# Patient Record
Sex: MALE | Race: WHITE | NOT HISPANIC OR LATINO | Employment: OTHER | URBAN - METROPOLITAN AREA
[De-identification: names, ages, dates, MRNs, and addresses within clinical notes are randomized per-mention and may not be internally consistent; named-entity substitution may affect disease eponyms.]

---

## 2018-04-11 LAB
ALBUMIN (HISTORICAL): 1.1 MG/DL
ALBUMIN CREATININE RATIO (HISTORICAL): 11.4 MG/G
ALBUMIN SERPL BCP-MCNC: 4.8 G/DL (ref 3.5–5.7)
ALP SERPL-CCNC: 62 IU/L (ref 55–165)
ALT SERPL W P-5'-P-CCNC: 17 IU/L (ref 7–29)
ANION GAP SERPL CALCULATED.3IONS-SCNC: 11.7 MM/L
AST SERPL W P-5'-P-CCNC: 18 U/L (ref 8–27)
BASOPHILS # BLD AUTO: 0 X3/UL (ref 0–0.3)
BASOPHILS # BLD AUTO: 0.4 % (ref 0–2)
BILIRUB SERPL-MCNC: 1.7 MG/DL (ref 0.3–1)
BUN SERPL-MCNC: 18 MG/DL (ref 7–25)
CALCIUM SERPL-MCNC: 10 MG/DL (ref 8.6–10.5)
CHLORIDE SERPL-SCNC: 103 MM/L (ref 98–107)
CO2 SERPL-SCNC: 32 MM/L (ref 21–31)
CREAT SERPL-MCNC: 0.89 MG/DL (ref 0.7–1.3)
CREATININE, RANDOM URINE (HISTORICAL): 96.1 MG/DL
DEPRECATED RDW RBC AUTO: 13.8 %
EGFR (HISTORICAL): > 60 GFR
EGFR AFRICAN AMERICAN (HISTORICAL): > 60 GFR
EOSINOPHIL # BLD AUTO: 0.1 X3/UL (ref 0–0.5)
EOSINOPHIL NFR BLD AUTO: 1.6 % (ref 0–5)
GLUCOSE (HISTORICAL): 111 MG/DL (ref 65–99)
HCT VFR BLD AUTO: 41.3 % (ref 42–52)
HGB BLD-MCNC: 14.3 G/DL (ref 14–18)
LYMPHOCYTES # BLD AUTO: 1.6 X3/UL (ref 1.2–4.2)
LYMPHOCYTES NFR BLD AUTO: 20.7 % (ref 20.5–51.1)
MCH RBC QN AUTO: 30.7 PG (ref 26–34)
MCHC RBC AUTO-ENTMCNC: 34.5 G/DL (ref 31–37)
MCV RBC AUTO: 89.1 FL (ref 81–99)
MONOCYTES # BLD AUTO: 0.7 X3/UL (ref 0–1)
MONOCYTES NFR BLD AUTO: 9.1 % (ref 1.7–12)
NEUTROPHILS # BLD AUTO: 5.2 X3/UL (ref 1.4–6.5)
NEUTS SEG NFR BLD AUTO: 68.2 % (ref 42.2–75.2)
OSMOLALITY, SERUM (HISTORICAL): 288 MOSM (ref 262–291)
PLATELET # BLD AUTO: 227 X3/UL (ref 130–400)
PMV BLD AUTO: 7.6 FL
POTASSIUM SERPL-SCNC: 3.7 MM/L (ref 3.5–5.5)
PSA (HISTORICAL): 4.1 NG/ML (ref 0–4)
RBC # BLD AUTO: 4.64 X6/UL (ref 4.3–5.9)
SODIUM SERPL-SCNC: 143 MM/L (ref 134–143)
TOTAL PROTEIN (HISTORICAL): 7.2 G/DL (ref 6.4–8.9)
WBC # BLD AUTO: 7.7 X3/UL (ref 4.8–10.8)

## 2019-03-12 ENCOUNTER — APPOINTMENT (OUTPATIENT)
Dept: LAB | Facility: HOSPITAL | Age: 74
End: 2019-03-12
Attending: INTERNAL MEDICINE
Payer: MEDICARE

## 2019-03-12 ENCOUNTER — TRANSCRIBE ORDERS (OUTPATIENT)
Dept: ADMINISTRATIVE | Facility: HOSPITAL | Age: 74
End: 2019-03-12

## 2019-03-12 DIAGNOSIS — E11.9 TYPE 2 DIABETES MELLITUS WITHOUT COMPLICATION, UNSPECIFIED WHETHER LONG TERM INSULIN USE (HCC): ICD-10-CM

## 2019-03-12 DIAGNOSIS — E11.9 TYPE 2 DIABETES MELLITUS WITHOUT COMPLICATION, UNSPECIFIED WHETHER LONG TERM INSULIN USE (HCC): Primary | ICD-10-CM

## 2019-03-12 DIAGNOSIS — Z79.1 ENCOUNTER FOR LONG-TERM (CURRENT) USE OF NSAIDS: ICD-10-CM

## 2019-03-12 LAB
ALBUMIN SERPL BCP-MCNC: 4.6 G/DL (ref 3.5–5.7)
ALP SERPL-CCNC: 57 U/L (ref 55–165)
ALT SERPL W P-5'-P-CCNC: 18 U/L (ref 7–52)
ANION GAP SERPL CALCULATED.3IONS-SCNC: 10 MMOL/L (ref 4–13)
AST SERPL W P-5'-P-CCNC: 20 U/L (ref 13–39)
BASOPHILS # BLD AUTO: 0 THOUSANDS/ΜL (ref 0–0.1)
BASOPHILS NFR BLD AUTO: 0 % (ref 0–1)
BILIRUB SERPL-MCNC: 2.5 MG/DL (ref 0.2–1)
BUN SERPL-MCNC: 19 MG/DL (ref 7–25)
CALCIUM SERPL-MCNC: 10 MG/DL (ref 8.6–10.5)
CHLORIDE SERPL-SCNC: 102 MMOL/L (ref 98–107)
CHOLEST SERPL-MCNC: 141 MG/DL (ref 0–200)
CO2 SERPL-SCNC: 30 MMOL/L (ref 21–31)
CREAT SERPL-MCNC: 0.82 MG/DL (ref 0.7–1.3)
CREAT UR-MCNC: 21.1 MG/DL
EOSINOPHIL # BLD AUTO: 0.1 THOUSAND/ΜL (ref 0–0.61)
EOSINOPHIL NFR BLD AUTO: 2 % (ref 0–6)
ERYTHROCYTE [DISTWIDTH] IN BLOOD BY AUTOMATED COUNT: 13.5 % (ref 11.6–15.1)
EST. AVERAGE GLUCOSE BLD GHB EST-MCNC: 140 MG/DL
GFR SERPL CREATININE-BSD FRML MDRD: 87 ML/MIN/1.73SQ M
GLUCOSE P FAST SERPL-MCNC: 97 MG/DL (ref 65–99)
HBA1C MFR BLD: 6.5 % (ref 4.2–6.3)
HCT VFR BLD AUTO: 41 % (ref 42–52)
HDLC SERPL-MCNC: 54 MG/DL (ref 40–60)
HGB BLD-MCNC: 14 G/DL (ref 12–17)
LDLC SERPL CALC-MCNC: 52 MG/DL (ref 0–100)
LYMPHOCYTES # BLD AUTO: 1.9 THOUSANDS/ΜL (ref 0.6–4.47)
LYMPHOCYTES NFR BLD AUTO: 23 % (ref 14–44)
MCH RBC QN AUTO: 30.6 PG (ref 26.8–34.3)
MCHC RBC AUTO-ENTMCNC: 34.2 G/DL (ref 31.4–37.4)
MCV RBC AUTO: 90 FL (ref 82–98)
MICROALBUMIN UR-MCNC: <5 MG/L (ref 0–20)
MICROALBUMIN/CREAT 24H UR: <24 MG/G CREATININE (ref 0–30)
MONOCYTES # BLD AUTO: 0.7 THOUSAND/ΜL (ref 0.17–1.22)
MONOCYTES NFR BLD AUTO: 8 % (ref 4–12)
NEUTROPHILS # BLD AUTO: 5.5 THOUSANDS/ΜL (ref 1.85–7.62)
NEUTS SEG NFR BLD AUTO: 66 % (ref 43–75)
NONHDLC SERPL-MCNC: 87 MG/DL
NRBC BLD AUTO-RTO: 0 /100 WBCS
PLATELET # BLD AUTO: 247 THOUSANDS/UL (ref 149–390)
PMV BLD AUTO: 8.2 FL (ref 8.9–12.7)
POTASSIUM SERPL-SCNC: 3.9 MMOL/L (ref 3.5–5.5)
PROT SERPL-MCNC: 7.1 G/DL (ref 6.4–8.9)
RBC # BLD AUTO: 4.58 MILLION/UL (ref 3.88–5.62)
SODIUM SERPL-SCNC: 142 MMOL/L (ref 134–143)
TRIGL SERPL-MCNC: 173 MG/DL (ref 44–166)
WBC # BLD AUTO: 8.2 THOUSAND/UL (ref 4.31–10.16)

## 2019-03-12 PROCEDURE — 80061 LIPID PANEL: CPT

## 2019-03-12 PROCEDURE — 83036 HEMOGLOBIN GLYCOSYLATED A1C: CPT

## 2019-03-12 PROCEDURE — 85025 COMPLETE CBC W/AUTO DIFF WBC: CPT

## 2019-03-12 PROCEDURE — 82043 UR ALBUMIN QUANTITATIVE: CPT

## 2019-03-12 PROCEDURE — 82570 ASSAY OF URINE CREATININE: CPT

## 2019-03-12 PROCEDURE — 80053 COMPREHEN METABOLIC PANEL: CPT

## 2019-03-12 PROCEDURE — 36415 COLL VENOUS BLD VENIPUNCTURE: CPT

## 2019-06-10 ENCOUNTER — APPOINTMENT (OUTPATIENT)
Dept: LAB | Facility: HOSPITAL | Age: 74
End: 2019-06-10
Attending: UROLOGY
Payer: MEDICARE

## 2019-06-10 ENCOUNTER — TRANSCRIBE ORDERS (OUTPATIENT)
Dept: ADMINISTRATIVE | Facility: HOSPITAL | Age: 74
End: 2019-06-10

## 2019-06-10 DIAGNOSIS — R97.20 ELEVATED PROSTATE SPECIFIC ANTIGEN (PSA): ICD-10-CM

## 2019-06-10 DIAGNOSIS — R97.20 ELEVATED PROSTATE SPECIFIC ANTIGEN (PSA): Primary | ICD-10-CM

## 2019-06-10 LAB
BACTERIA UR QL AUTO: ABNORMAL /HPF
BILIRUB UR QL STRIP: NEGATIVE
CLARITY UR: CLEAR
COLOR UR: YELLOW
GLUCOSE UR STRIP-MCNC: NEGATIVE MG/DL
HGB UR QL STRIP.AUTO: NEGATIVE
KETONES UR STRIP-MCNC: NEGATIVE MG/DL
LEUKOCYTE ESTERASE UR QL STRIP: ABNORMAL
MUCOUS THREADS UR QL AUTO: ABNORMAL
NITRITE UR QL STRIP: NEGATIVE
NON-SQ EPI CELLS URNS QL MICRO: ABNORMAL /HPF
PH UR STRIP.AUTO: 5.5 [PH]
PROT UR STRIP-MCNC: NEGATIVE MG/DL
RBC #/AREA URNS AUTO: ABNORMAL /HPF
SP GR UR STRIP.AUTO: 1.02 (ref 1–1.03)
UROBILINOGEN UR QL STRIP.AUTO: 0.2 E.U./DL
WBC #/AREA URNS AUTO: ABNORMAL /HPF

## 2019-06-10 PROCEDURE — 84153 ASSAY OF PSA TOTAL: CPT

## 2019-06-10 PROCEDURE — 36415 COLL VENOUS BLD VENIPUNCTURE: CPT

## 2019-06-10 PROCEDURE — 84154 ASSAY OF PSA FREE: CPT

## 2019-06-10 PROCEDURE — 81001 URINALYSIS AUTO W/SCOPE: CPT

## 2019-06-11 LAB
PSA FREE MFR SERPL: 40.5 %
PSA FREE SERPL-MCNC: 1.74 NG/ML
PSA SERPL-MCNC: 4.3 NG/ML (ref 0–4)

## 2019-09-30 ENCOUNTER — APPOINTMENT (OUTPATIENT)
Dept: LAB | Facility: HOSPITAL | Age: 74
End: 2019-09-30
Attending: UROLOGY
Payer: MEDICARE

## 2019-09-30 ENCOUNTER — TRANSCRIBE ORDERS (OUTPATIENT)
Dept: ADMINISTRATIVE | Facility: HOSPITAL | Age: 74
End: 2019-09-30

## 2019-09-30 DIAGNOSIS — R97.20 ELEVATED PROSTATE SPECIFIC ANTIGEN (PSA): ICD-10-CM

## 2019-09-30 DIAGNOSIS — R97.20 ELEVATED PROSTATE SPECIFIC ANTIGEN (PSA): Primary | ICD-10-CM

## 2019-09-30 DIAGNOSIS — E11.9 TYPE 2 DIABETES MELLITUS WITHOUT COMPLICATION, WITHOUT LONG-TERM CURRENT USE OF INSULIN (HCC): ICD-10-CM

## 2019-09-30 DIAGNOSIS — E11.9 TYPE 2 DIABETES MELLITUS WITHOUT COMPLICATION, WITHOUT LONG-TERM CURRENT USE OF INSULIN (HCC): Primary | ICD-10-CM

## 2019-09-30 LAB
EST. AVERAGE GLUCOSE BLD GHB EST-MCNC: 128 MG/DL
HBA1C MFR BLD: 6.1 % (ref 4.2–6.3)

## 2019-09-30 PROCEDURE — 36415 COLL VENOUS BLD VENIPUNCTURE: CPT

## 2019-09-30 PROCEDURE — 83036 HEMOGLOBIN GLYCOSYLATED A1C: CPT

## 2019-09-30 PROCEDURE — 84154 ASSAY OF PSA FREE: CPT

## 2019-09-30 PROCEDURE — 84153 ASSAY OF PSA TOTAL: CPT

## 2019-10-01 LAB
PSA FREE MFR SERPL: 36.7 %
PSA FREE SERPL-MCNC: 1.69 NG/ML
PSA SERPL-MCNC: 4.6 NG/ML (ref 0–4)

## 2019-10-31 ENCOUNTER — TRANSCRIBE ORDERS (OUTPATIENT)
Dept: ADMINISTRATIVE | Facility: HOSPITAL | Age: 74
End: 2019-10-31

## 2019-10-31 ENCOUNTER — APPOINTMENT (OUTPATIENT)
Dept: LAB | Facility: HOSPITAL | Age: 74
End: 2019-10-31
Attending: UROLOGY
Payer: MEDICARE

## 2019-10-31 DIAGNOSIS — R97.20 ELEVATED PROSTATE SPECIFIC ANTIGEN (PSA): Primary | ICD-10-CM

## 2019-10-31 DIAGNOSIS — R97.20 ELEVATED PROSTATE SPECIFIC ANTIGEN (PSA): ICD-10-CM

## 2019-10-31 PROCEDURE — 84153 ASSAY OF PSA TOTAL: CPT

## 2019-10-31 PROCEDURE — 36415 COLL VENOUS BLD VENIPUNCTURE: CPT

## 2019-10-31 PROCEDURE — 84154 ASSAY OF PSA FREE: CPT

## 2019-11-01 LAB
PSA FREE MFR SERPL: 27.7 %
PSA FREE SERPL-MCNC: 1.47 NG/ML
PSA SERPL-MCNC: 5.3 NG/ML (ref 0–4)

## 2020-02-04 ENCOUNTER — APPOINTMENT (OUTPATIENT)
Dept: LAB | Facility: CLINIC | Age: 75
End: 2020-02-04
Payer: MEDICARE

## 2020-02-04 ENCOUNTER — TRANSCRIBE ORDERS (OUTPATIENT)
Dept: URGENT CARE | Facility: CLINIC | Age: 75
End: 2020-02-04

## 2020-02-04 DIAGNOSIS — R97.20 ELEVATED PROSTATE SPECIFIC ANTIGEN (PSA): Primary | ICD-10-CM

## 2020-02-04 DIAGNOSIS — R97.20 ELEVATED PROSTATE SPECIFIC ANTIGEN (PSA): ICD-10-CM

## 2020-02-04 PROCEDURE — 84153 ASSAY OF PSA TOTAL: CPT

## 2020-02-04 PROCEDURE — 84154 ASSAY OF PSA FREE: CPT

## 2020-02-04 PROCEDURE — 36415 COLL VENOUS BLD VENIPUNCTURE: CPT

## 2020-02-05 LAB
PSA FREE MFR SERPL: 38.6 %
PSA FREE SERPL-MCNC: 1.35 NG/ML
PSA SERPL-MCNC: 3.5 NG/ML (ref 0–4)

## 2020-02-24 ENCOUNTER — APPOINTMENT (OUTPATIENT)
Dept: LAB | Facility: CLINIC | Age: 75
End: 2020-02-24
Payer: MEDICARE

## 2020-02-24 ENCOUNTER — TRANSCRIBE ORDERS (OUTPATIENT)
Dept: URGENT CARE | Facility: CLINIC | Age: 75
End: 2020-02-24

## 2020-02-24 DIAGNOSIS — E11.9 DIABETES MELLITUS WITHOUT COMPLICATION (HCC): ICD-10-CM

## 2020-02-24 DIAGNOSIS — Z79.1 ENCOUNTER FOR LONG-TERM (CURRENT) USE OF NON-STEROIDAL ANTI-INFLAMMATORIES: ICD-10-CM

## 2020-02-24 DIAGNOSIS — E11.9 DIABETES MELLITUS WITHOUT COMPLICATION (HCC): Primary | ICD-10-CM

## 2020-02-24 LAB
ALBUMIN SERPL BCP-MCNC: 4.3 G/DL (ref 3.5–5)
ALP SERPL-CCNC: 76 U/L (ref 46–116)
ALT SERPL W P-5'-P-CCNC: 26 U/L (ref 12–78)
ANION GAP SERPL CALCULATED.3IONS-SCNC: 4 MMOL/L (ref 4–13)
AST SERPL W P-5'-P-CCNC: 20 U/L (ref 5–45)
BILIRUB SERPL-MCNC: 2.5 MG/DL (ref 0.2–1)
BUN SERPL-MCNC: 14 MG/DL (ref 5–25)
CALCIUM SERPL-MCNC: 9.3 MG/DL (ref 8.3–10.1)
CHLORIDE SERPL-SCNC: 106 MMOL/L (ref 100–108)
CO2 SERPL-SCNC: 28 MMOL/L (ref 21–32)
CREAT SERPL-MCNC: 0.86 MG/DL (ref 0.6–1.3)
CREAT UR-MCNC: 28.6 MG/DL
ERYTHROCYTE [DISTWIDTH] IN BLOOD BY AUTOMATED COUNT: 13 % (ref 11.6–15.1)
EST. AVERAGE GLUCOSE BLD GHB EST-MCNC: 126 MG/DL
GFR SERPL CREATININE-BSD FRML MDRD: 84 ML/MIN/1.73SQ M
GLUCOSE P FAST SERPL-MCNC: 101 MG/DL (ref 65–99)
HBA1C MFR BLD: 6 %
HCT VFR BLD AUTO: 41.4 % (ref 36.5–49.3)
HGB BLD-MCNC: 13.5 G/DL (ref 12–17)
MCH RBC QN AUTO: 30.1 PG (ref 26.8–34.3)
MCHC RBC AUTO-ENTMCNC: 32.6 G/DL (ref 31.4–37.4)
MCV RBC AUTO: 92 FL (ref 82–98)
MICROALBUMIN UR-MCNC: 6.5 MG/L (ref 0–20)
MICROALBUMIN/CREAT 24H UR: 23 MG/G CREATININE (ref 0–30)
PLATELET # BLD AUTO: 230 THOUSANDS/UL (ref 149–390)
PMV BLD AUTO: 10 FL (ref 8.9–12.7)
POTASSIUM SERPL-SCNC: 3.6 MMOL/L (ref 3.5–5.3)
PROT SERPL-MCNC: 7.5 G/DL (ref 6.4–8.2)
RBC # BLD AUTO: 4.49 MILLION/UL (ref 3.88–5.62)
SODIUM SERPL-SCNC: 138 MMOL/L (ref 136–145)
WBC # BLD AUTO: 9.59 THOUSAND/UL (ref 4.31–10.16)

## 2020-02-24 PROCEDURE — 36415 COLL VENOUS BLD VENIPUNCTURE: CPT

## 2020-02-24 PROCEDURE — 82043 UR ALBUMIN QUANTITATIVE: CPT | Performed by: INTERNAL MEDICINE

## 2020-02-24 PROCEDURE — 82570 ASSAY OF URINE CREATININE: CPT | Performed by: INTERNAL MEDICINE

## 2020-02-24 PROCEDURE — 80053 COMPREHEN METABOLIC PANEL: CPT

## 2020-02-24 PROCEDURE — 83036 HEMOGLOBIN GLYCOSYLATED A1C: CPT

## 2020-02-24 PROCEDURE — 85027 COMPLETE CBC AUTOMATED: CPT

## 2020-03-16 ENCOUNTER — APPOINTMENT (OUTPATIENT)
Dept: LAB | Facility: CLINIC | Age: 75
End: 2020-03-16
Payer: MEDICARE

## 2020-03-16 ENCOUNTER — TRANSCRIBE ORDERS (OUTPATIENT)
Dept: URGENT CARE | Facility: CLINIC | Age: 75
End: 2020-03-16

## 2020-03-16 DIAGNOSIS — R97.20 ELEVATED PROSTATE SPECIFIC ANTIGEN (PSA): Primary | ICD-10-CM

## 2020-03-16 DIAGNOSIS — Z79.1 ENCOUNTER FOR LONG-TERM (CURRENT) USE OF NON-STEROIDAL ANTI-INFLAMMATORIES: ICD-10-CM

## 2020-03-16 DIAGNOSIS — E11.69 TYPE 2 DIABETES MELLITUS WITH OTHER SPECIFIED COMPLICATION, UNSPECIFIED WHETHER LONG TERM INSULIN USE (HCC): ICD-10-CM

## 2020-03-16 DIAGNOSIS — E11.69 TYPE 2 DIABETES MELLITUS WITH OTHER SPECIFIED COMPLICATION, UNSPECIFIED WHETHER LONG TERM INSULIN USE (HCC): Primary | ICD-10-CM

## 2020-03-16 DIAGNOSIS — R97.20 ELEVATED PROSTATE SPECIFIC ANTIGEN (PSA): ICD-10-CM

## 2020-03-16 LAB
ALBUMIN SERPL BCP-MCNC: 4.1 G/DL (ref 3.5–5)
ALP SERPL-CCNC: 77 U/L (ref 46–116)
ALT SERPL W P-5'-P-CCNC: 39 U/L (ref 12–78)
ANION GAP SERPL CALCULATED.3IONS-SCNC: 7 MMOL/L (ref 4–13)
AST SERPL W P-5'-P-CCNC: 34 U/L (ref 5–45)
BASOPHILS # BLD AUTO: 0.02 THOUSANDS/ΜL (ref 0–0.1)
BASOPHILS NFR BLD AUTO: 0 % (ref 0–1)
BILIRUB SERPL-MCNC: 2.73 MG/DL (ref 0.2–1)
BILIRUB UR QL STRIP: NEGATIVE
BUN SERPL-MCNC: 17 MG/DL (ref 5–25)
CALCIUM SERPL-MCNC: 9.2 MG/DL (ref 8.3–10.1)
CHLORIDE SERPL-SCNC: 108 MMOL/L (ref 100–108)
CHOLEST SERPL-MCNC: 173 MG/DL (ref 50–200)
CLARITY UR: CLEAR
CO2 SERPL-SCNC: 28 MMOL/L (ref 21–32)
COLOR UR: YELLOW
CREAT SERPL-MCNC: 0.91 MG/DL (ref 0.6–1.3)
CREAT UR-MCNC: <13 MG/DL
EOSINOPHIL # BLD AUTO: 0.09 THOUSAND/ΜL (ref 0–0.61)
EOSINOPHIL NFR BLD AUTO: 1 % (ref 0–6)
ERYTHROCYTE [DISTWIDTH] IN BLOOD BY AUTOMATED COUNT: 13 % (ref 11.6–15.1)
EST. AVERAGE GLUCOSE BLD GHB EST-MCNC: 117 MG/DL
GFR SERPL CREATININE-BSD FRML MDRD: 82 ML/MIN/1.73SQ M
GLUCOSE P FAST SERPL-MCNC: 101 MG/DL (ref 65–99)
GLUCOSE UR STRIP-MCNC: NEGATIVE MG/DL
HBA1C MFR BLD: 5.7 %
HCT VFR BLD AUTO: 41.3 % (ref 36.5–49.3)
HDLC SERPL-MCNC: 68 MG/DL
HGB BLD-MCNC: 13.7 G/DL (ref 12–17)
HGB UR QL STRIP.AUTO: NEGATIVE
IMM GRANULOCYTES # BLD AUTO: 0.03 THOUSAND/UL (ref 0–0.2)
IMM GRANULOCYTES NFR BLD AUTO: 0 % (ref 0–2)
KETONES UR STRIP-MCNC: NEGATIVE MG/DL
LDLC SERPL CALC-MCNC: 68 MG/DL (ref 0–100)
LEUKOCYTE ESTERASE UR QL STRIP: NEGATIVE
LYMPHOCYTES # BLD AUTO: 1.59 THOUSANDS/ΜL (ref 0.6–4.47)
LYMPHOCYTES NFR BLD AUTO: 17 % (ref 14–44)
MCH RBC QN AUTO: 30.4 PG (ref 26.8–34.3)
MCHC RBC AUTO-ENTMCNC: 33.2 G/DL (ref 31.4–37.4)
MCV RBC AUTO: 92 FL (ref 82–98)
MICROALBUMIN UR-MCNC: 5.5 MG/L (ref 0–20)
MICROALBUMIN/CREAT 24H UR: >42 MG/G CREATININE (ref 0–30)
MONOCYTES # BLD AUTO: 0.62 THOUSAND/ΜL (ref 0.17–1.22)
MONOCYTES NFR BLD AUTO: 7 % (ref 4–12)
NEUTROPHILS # BLD AUTO: 6.77 THOUSANDS/ΜL (ref 1.85–7.62)
NEUTS SEG NFR BLD AUTO: 75 % (ref 43–75)
NITRITE UR QL STRIP: NEGATIVE
NONHDLC SERPL-MCNC: 105 MG/DL
NRBC BLD AUTO-RTO: 0 /100 WBCS
PH UR STRIP.AUTO: 6.5 [PH]
PLATELET # BLD AUTO: 226 THOUSANDS/UL (ref 149–390)
PMV BLD AUTO: 10.3 FL (ref 8.9–12.7)
POTASSIUM SERPL-SCNC: 3.9 MMOL/L (ref 3.5–5.3)
PROT SERPL-MCNC: 7.5 G/DL (ref 6.4–8.2)
PROT UR STRIP-MCNC: NEGATIVE MG/DL
RBC # BLD AUTO: 4.5 MILLION/UL (ref 3.88–5.62)
SODIUM SERPL-SCNC: 143 MMOL/L (ref 136–145)
SP GR UR STRIP.AUTO: 1 (ref 1–1.03)
TRIGL SERPL-MCNC: 186 MG/DL
UROBILINOGEN UR QL STRIP.AUTO: 0.2 E.U./DL
WBC # BLD AUTO: 9.12 THOUSAND/UL (ref 4.31–10.16)

## 2020-03-16 PROCEDURE — 36415 COLL VENOUS BLD VENIPUNCTURE: CPT | Performed by: INTERNAL MEDICINE

## 2020-03-16 PROCEDURE — 85025 COMPLETE CBC W/AUTO DIFF WBC: CPT | Performed by: INTERNAL MEDICINE

## 2020-03-16 PROCEDURE — 84153 ASSAY OF PSA TOTAL: CPT

## 2020-03-16 PROCEDURE — 82043 UR ALBUMIN QUANTITATIVE: CPT | Performed by: INTERNAL MEDICINE

## 2020-03-16 PROCEDURE — 83036 HEMOGLOBIN GLYCOSYLATED A1C: CPT

## 2020-03-16 PROCEDURE — 80053 COMPREHEN METABOLIC PANEL: CPT

## 2020-03-16 PROCEDURE — 80061 LIPID PANEL: CPT

## 2020-03-16 PROCEDURE — 82570 ASSAY OF URINE CREATININE: CPT | Performed by: INTERNAL MEDICINE

## 2020-03-16 PROCEDURE — 81003 URINALYSIS AUTO W/O SCOPE: CPT | Performed by: UROLOGY

## 2020-03-16 PROCEDURE — 84154 ASSAY OF PSA FREE: CPT

## 2020-03-17 LAB
PSA FREE MFR SERPL: 40.3 %
PSA FREE SERPL-MCNC: 1.41 NG/ML
PSA SERPL-MCNC: 3.5 NG/ML (ref 0–4)

## 2020-08-28 ENCOUNTER — APPOINTMENT (EMERGENCY)
Dept: CT IMAGING | Facility: HOSPITAL | Age: 75
End: 2020-08-28
Payer: MEDICARE

## 2020-08-28 ENCOUNTER — APPOINTMENT (EMERGENCY)
Dept: RADIOLOGY | Facility: HOSPITAL | Age: 75
DRG: 963 | End: 2020-08-28
Payer: MEDICARE

## 2020-08-28 ENCOUNTER — HOSPITAL ENCOUNTER (EMERGENCY)
Facility: HOSPITAL | Age: 75
End: 2020-08-28
Attending: FAMILY MEDICINE | Admitting: FAMILY MEDICINE
Payer: MEDICARE

## 2020-08-28 ENCOUNTER — APPOINTMENT (EMERGENCY)
Dept: RADIOLOGY | Facility: HOSPITAL | Age: 75
End: 2020-08-28
Payer: MEDICARE

## 2020-08-28 ENCOUNTER — APPOINTMENT (INPATIENT)
Dept: RADIOLOGY | Facility: HOSPITAL | Age: 75
DRG: 963 | End: 2020-08-28
Payer: MEDICARE

## 2020-08-28 ENCOUNTER — HOSPITAL ENCOUNTER (INPATIENT)
Facility: HOSPITAL | Age: 75
LOS: 18 days | Discharge: NURSING, REHAB OR LONG-TERM CARE | DRG: 963 | End: 2020-09-15
Attending: EMERGENCY MEDICINE | Admitting: SURGERY
Payer: MEDICARE

## 2020-08-28 VITALS
TEMPERATURE: 97.9 F | SYSTOLIC BLOOD PRESSURE: 122 MMHG | HEART RATE: 93 BPM | DIASTOLIC BLOOD PRESSURE: 69 MMHG | WEIGHT: 168.43 LBS | OXYGEN SATURATION: 99 % | RESPIRATION RATE: 13 BRPM

## 2020-08-28 DIAGNOSIS — F03.90 DEMENTIA (HCC): ICD-10-CM

## 2020-08-28 DIAGNOSIS — S06.9X9A TRAUMATIC BRAIN INJURY WITH LOSS OF CONSCIOUSNESS, INITIAL ENCOUNTER (HCC): Primary | ICD-10-CM

## 2020-08-28 DIAGNOSIS — E87.2 METABOLIC ACIDOSIS: ICD-10-CM

## 2020-08-28 DIAGNOSIS — S06.5X9A SUBDURAL HEMATOMA (HCC): ICD-10-CM

## 2020-08-28 DIAGNOSIS — I60.9 SUBARACHNOID BLEED (HCC): ICD-10-CM

## 2020-08-28 DIAGNOSIS — M62.82 RHABDOMYOLYSIS: ICD-10-CM

## 2020-08-28 DIAGNOSIS — I61.5 INTRAVENTRICULAR HEMORRHAGE (HCC): Primary | ICD-10-CM
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
## 2020-08-28 DIAGNOSIS — Z78.1 REQUIRES RESTRAINTS FOR SAFETY: ICD-10-CM

## 2020-08-28 DIAGNOSIS — R79.89 BLOOD CULTURE POSITIVE FOR MICROORGANISM: ICD-10-CM

## 2020-08-28 DIAGNOSIS — D72.829 LEUKOCYTOSIS: ICD-10-CM

## 2020-08-28 DIAGNOSIS — S42.291A CLOSED FRACTURE OF HEAD OF RIGHT HUMERUS, INITIAL ENCOUNTER: ICD-10-CM

## 2020-08-28 DIAGNOSIS — R77.8 ELEVATED TROPONIN: ICD-10-CM

## 2020-08-28 PROBLEM — T07.XXXA ABRASIONS OF MULTIPLE SITES: Status: ACTIVE | Noted: 2020-08-28

## 2020-08-28 PROBLEM — T79.6XXA TRAUMATIC RHABDOMYOLYSIS (HCC): Status: ACTIVE | Noted: 2020-08-28

## 2020-08-28 PROBLEM — G93.40 ENCEPHALOPATHY ACUTE: Status: ACTIVE | Noted: 2020-08-28

## 2020-08-28 PROBLEM — E11.9 TYPE 2 DIABETES MELLITUS, WITHOUT LONG-TERM CURRENT USE OF INSULIN (HCC): Chronic | Status: ACTIVE | Noted: 2020-08-28

## 2020-08-28 PROBLEM — L89.101 PRESSURE INJURY OF BACK, STAGE 1: Status: ACTIVE | Noted: 2020-08-28

## 2020-08-28 PROBLEM — N17.9 AKI (ACUTE KIDNEY INJURY) (HCC): Status: ACTIVE | Noted: 2020-08-28

## 2020-08-28 LAB
ALBUMIN SERPL BCP-MCNC: 4 G/DL (ref 3.5–5.7)
ALP SERPL-CCNC: 84 U/L (ref 55–165)
ALT SERPL W P-5'-P-CCNC: 47 U/L (ref 7–52)
ANION GAP SERPL CALCULATED.3IONS-SCNC: 15 MMOL/L (ref 4–13)
ANION GAP SERPL CALCULATED.3IONS-SCNC: 9 MMOL/L (ref 4–13)
APTT PPP: 30 SECONDS (ref 23–37)
ARTERIAL PATENCY WRIST A: YES
AST SERPL W P-5'-P-CCNC: 130 U/L (ref 13–39)
ATRIAL RATE: 95 BPM
BACTERIA UR QL AUTO: ABNORMAL /HPF
BASE EXCESS BLDA CALC-SCNC: -7.6 MMOL/L (ref -2–3)
BASOPHILS # BLD AUTO: 0.03 THOUSANDS/ΜL (ref 0–0.1)
BASOPHILS NFR BLD AUTO: 0 % (ref 0–1)
BILIRUB SERPL-MCNC: 4.3 MG/DL (ref 0.2–1)
BILIRUB UR QL STRIP: ABNORMAL
BUN SERPL-MCNC: 38 MG/DL (ref 5–25)
BUN SERPL-MCNC: 41 MG/DL (ref 7–25)
CALCIUM SERPL-MCNC: 8.4 MG/DL (ref 8.3–10.1)
CALCIUM SERPL-MCNC: 8.9 MG/DL (ref 8.6–10.5)
CHLORIDE SERPL-SCNC: 106 MMOL/L (ref 98–107)
CHLORIDE SERPL-SCNC: 114 MMOL/L (ref 100–108)
CK MB SERPL-MCNC: 1.1 % (ref 0.6–6.3)
CK MB SERPL-MCNC: 74.4 NG/ML (ref 0–5)
CK MB SERPL-MCNC: 76.3 NG/ML (ref 0.6–6.3)
CK MB SERPL-MCNC: <1 % (ref 0–2.5)
CK SERPL-CCNC: 7212 U/L (ref 30–308)
CK SERPL-CCNC: ABNORMAL U/L (ref 39–308)
CLARITY UR: ABNORMAL
CO2 SERPL-SCNC: 22 MMOL/L (ref 21–31)
CO2 SERPL-SCNC: 22 MMOL/L (ref 21–32)
COARSE GRAN CASTS URNS QL MICRO: ABNORMAL /LPF
COLOR UR: YELLOW
CREAT SERPL-MCNC: 0.99 MG/DL (ref 0.6–1.3)
CREAT SERPL-MCNC: 1.16 MG/DL (ref 0.7–1.3)
EOSINOPHIL # BLD AUTO: 0 THOUSAND/ΜL (ref 0–0.61)
EOSINOPHIL NFR BLD AUTO: 0 % (ref 0–6)
ERYTHROCYTE [DISTWIDTH] IN BLOOD BY AUTOMATED COUNT: 13.9 % (ref 11.6–15.1)
ERYTHROCYTE [DISTWIDTH] IN BLOOD BY AUTOMATED COUNT: 14.2 % (ref 11.5–14.5)
ETHANOL SERPL-MCNC: <10 MG/DL
FINE GRAN CASTS URNS QL MICRO: ABNORMAL /LPF
GFR SERPL CREATININE-BSD FRML MDRD: 60 ML/MIN/1.73SQ M
GFR SERPL CREATININE-BSD FRML MDRD: 73 ML/MIN/1.73SQ M
GLUCOSE SERPL-MCNC: 94 MG/DL (ref 65–99)
GLUCOSE SERPL-MCNC: 97 MG/DL (ref 65–140)
GLUCOSE UR STRIP-MCNC: NEGATIVE MG/DL
HCO3 BLDA-SCNC: 18.6 MMOL/L (ref 22–28)
HCT VFR BLD AUTO: 36.2 % (ref 36.5–49.3)
HCT VFR BLD AUTO: 37.6 % (ref 36.5–49.3)
HCT VFR BLD AUTO: 38.8 % (ref 42–47)
HGB BLD-MCNC: 11.9 G/DL (ref 12–17)
HGB BLD-MCNC: 12.4 G/DL (ref 12–17)
HGB BLD-MCNC: 13 G/DL (ref 14–18)
HGB UR QL STRIP.AUTO: ABNORMAL
IMM GRANULOCYTES # BLD AUTO: 0.05 THOUSAND/UL (ref 0–0.2)
IMM GRANULOCYTES NFR BLD AUTO: 0 % (ref 0–2)
INR PPP: 1.21 (ref 0.84–1.19)
KETONES UR STRIP-MCNC: ABNORMAL MG/DL
LACTATE SERPL-SCNC: 1.7 MMOL/L (ref 0.5–2)
LACTATE SERPL-SCNC: 3.5 MMOL/L (ref 0.5–2)
LEUKOCYTE ESTERASE UR QL STRIP: NEGATIVE
LYMPHOCYTES # BLD AUTO: 1.16 THOUSAND/UL (ref 0.6–4.47)
LYMPHOCYTES # BLD AUTO: 1.21 THOUSANDS/ΜL (ref 0.6–4.47)
LYMPHOCYTES # BLD AUTO: 5 % (ref 20–51)
LYMPHOCYTES NFR BLD AUTO: 6 % (ref 14–44)
MCH RBC QN AUTO: 30.4 PG (ref 26.8–34.3)
MCH RBC QN AUTO: 30.5 PG (ref 26–34)
MCHC RBC AUTO-ENTMCNC: 33 G/DL (ref 31.4–37.4)
MCHC RBC AUTO-ENTMCNC: 33.6 G/DL (ref 31–37)
MCV RBC AUTO: 91 FL (ref 81–99)
MCV RBC AUTO: 92 FL (ref 82–98)
MONOCYTES # BLD AUTO: 0.69 THOUSAND/UL (ref 0–1.22)
MONOCYTES # BLD AUTO: 1.75 THOUSAND/ΜL (ref 0.17–1.22)
MONOCYTES NFR BLD AUTO: 3 % (ref 4–12)
MONOCYTES NFR BLD AUTO: 9 % (ref 4–12)
MUCOUS THREADS UR QL AUTO: ABNORMAL
NASAL CANNULA: 3
NEUTROPHILS # BLD AUTO: 16.12 THOUSANDS/ΜL (ref 1.85–7.62)
NEUTS BAND NFR BLD MANUAL: 17 % (ref 0–8)
NEUTS SEG # BLD: 21.25 THOUSAND/UL (ref 1.81–6.82)
NEUTS SEG NFR BLD AUTO: 75 % (ref 42–75)
NEUTS SEG NFR BLD AUTO: 85 % (ref 43–75)
NITRITE UR QL STRIP: NEGATIVE
NON-SQ EPI CELLS URNS QL MICRO: ABNORMAL /HPF
NRBC BLD AUTO-RTO: 0 /100 WBCS
O2 CT BLDA-SCNC: 18 ML/DL
OXYHGB MFR BLDA: 96.7 % (ref 94–100)
P AXIS: 75 DEGREES
PCO2 BLDA: 41.9 MM HG (ref 35–45)
PH BLDA: 7.27 [PH] (ref 7.35–7.45)
PH UR STRIP.AUTO: 5 [PH]
PLATELET # BLD AUTO: 240 THOUSANDS/UL (ref 149–390)
PLATELET # BLD AUTO: 280 THOUSANDS/UL (ref 149–390)
PLATELET BLD QL SMEAR: ADEQUATE
PMV BLD AUTO: 7.5 FL (ref 8.6–11.7)
PMV BLD AUTO: 9.6 FL (ref 8.9–12.7)
PO2 BLDA: 138 MM HG (ref 80–100)
POTASSIUM SERPL-SCNC: 3.3 MMOL/L (ref 3.5–5.3)
POTASSIUM SERPL-SCNC: 3.9 MMOL/L (ref 3.5–5.5)
PR INTERVAL: 144 MS
PROCALCITONIN SERPL-MCNC: 11.93 NG/ML
PROT SERPL-MCNC: 6.2 G/DL (ref 6.4–8.9)
PROT UR STRIP-MCNC: ABNORMAL MG/DL
PROTHROMBIN TIME: 15.2 SECONDS (ref 11.6–14.5)
QRS AXIS: 42 DEGREES
QRSD INTERVAL: 78 MS
QT INTERVAL: 382 MS
QTC INTERVAL: 480 MS
RBC # BLD AUTO: 4.08 MILLION/UL (ref 3.88–5.62)
RBC # BLD AUTO: 4.27 MILLION/UL (ref 4.3–5.9)
RBC #/AREA URNS AUTO: ABNORMAL /HPF
RBC MORPH BLD: NORMAL
SARS-COV-2 RNA RESP QL NAA+PROBE: NEGATIVE
SODIUM SERPL-SCNC: 143 MMOL/L (ref 134–143)
SODIUM SERPL-SCNC: 145 MMOL/L (ref 136–145)
SP GR UR STRIP.AUTO: >=1.03 (ref 1–1.03)
SPECIMEN SOURCE: ABNORMAL
T WAVE AXIS: 52 DEGREES
TOTAL CELLS COUNTED SPEC: 100
TROPONIN I SERPL-MCNC: 0.11 NG/ML
TROPONIN I SERPL-MCNC: 0.16 NG/ML
TROPONIN I SERPL-MCNC: 0.17 NG/ML
TSH SERPL DL<=0.05 MIU/L-ACNC: 0.46 UIU/ML (ref 0.36–3.74)
UROBILINOGEN UR QL STRIP.AUTO: 1 E.U./DL
VENTRICULAR RATE: 95 BPM
WBC # BLD AUTO: 19.16 THOUSAND/UL (ref 4.31–10.16)
WBC # BLD AUTO: 23.1 THOUSAND/UL (ref 4.8–10.8)
WBC #/AREA URNS AUTO: ABNORMAL /HPF
WBC CASTS URNS QL MICRO: ABNORMAL /LPF

## 2020-08-28 PROCEDURE — 73564 X-RAY EXAM KNEE 4 OR MORE: CPT

## 2020-08-28 PROCEDURE — 36415 COLL VENOUS BLD VENIPUNCTURE: CPT | Performed by: FAMILY MEDICINE

## 2020-08-28 PROCEDURE — 80320 DRUG SCREEN QUANTALCOHOLS: CPT | Performed by: FAMILY MEDICINE

## 2020-08-28 PROCEDURE — 84145 PROCALCITONIN (PCT): CPT | Performed by: FAMILY MEDICINE

## 2020-08-28 PROCEDURE — G1004 CDSM NDSC: HCPCS

## 2020-08-28 PROCEDURE — 93010 ELECTROCARDIOGRAM REPORT: CPT | Performed by: INTERNAL MEDICINE

## 2020-08-28 PROCEDURE — NC001 PR NO CHARGE: Performed by: NURSE PRACTITIONER

## 2020-08-28 PROCEDURE — 80048 BASIC METABOLIC PNL TOTAL CA: CPT | Performed by: NURSE PRACTITIONER

## 2020-08-28 PROCEDURE — 87077 CULTURE AEROBIC IDENTIFY: CPT | Performed by: FAMILY MEDICINE

## 2020-08-28 PROCEDURE — 87086 URINE CULTURE/COLONY COUNT: CPT | Performed by: FAMILY MEDICINE

## 2020-08-28 PROCEDURE — 87147 CULTURE TYPE IMMUNOLOGIC: CPT | Performed by: FAMILY MEDICINE

## 2020-08-28 PROCEDURE — 85610 PROTHROMBIN TIME: CPT | Performed by: FAMILY MEDICINE

## 2020-08-28 PROCEDURE — 72125 CT NECK SPINE W/O DYE: CPT

## 2020-08-28 PROCEDURE — 93005 ELECTROCARDIOGRAM TRACING: CPT

## 2020-08-28 PROCEDURE — 82805 BLOOD GASES W/O2 SATURATION: CPT | Performed by: FAMILY MEDICINE

## 2020-08-28 PROCEDURE — 84484 ASSAY OF TROPONIN QUANT: CPT | Performed by: NURSE PRACTITIONER

## 2020-08-28 PROCEDURE — 83605 ASSAY OF LACTIC ACID: CPT | Performed by: FAMILY MEDICINE

## 2020-08-28 PROCEDURE — 96365 THER/PROPH/DIAG IV INF INIT: CPT

## 2020-08-28 PROCEDURE — 99223 1ST HOSP IP/OBS HIGH 75: CPT | Performed by: INTERNAL MEDICINE

## 2020-08-28 PROCEDURE — 71260 CT THORAX DX C+: CPT

## 2020-08-28 PROCEDURE — 72131 CT LUMBAR SPINE W/O DYE: CPT

## 2020-08-28 PROCEDURE — 87186 SC STD MICRODIL/AGAR DIL: CPT | Performed by: FAMILY MEDICINE

## 2020-08-28 PROCEDURE — 82550 ASSAY OF CK (CPK): CPT | Performed by: NURSE PRACTITIONER

## 2020-08-28 PROCEDURE — 85027 COMPLETE CBC AUTOMATED: CPT | Performed by: FAMILY MEDICINE

## 2020-08-28 PROCEDURE — 82553 CREATINE MB FRACTION: CPT | Performed by: NURSE PRACTITIONER

## 2020-08-28 PROCEDURE — 84484 ASSAY OF TROPONIN QUANT: CPT | Performed by: FAMILY MEDICINE

## 2020-08-28 PROCEDURE — 73030 X-RAY EXAM OF SHOULDER: CPT

## 2020-08-28 PROCEDURE — 99285 EMERGENCY DEPT VISIT HI MDM: CPT

## 2020-08-28 PROCEDURE — 85007 BL SMEAR W/DIFF WBC COUNT: CPT | Performed by: FAMILY MEDICINE

## 2020-08-28 PROCEDURE — 85018 HEMOGLOBIN: CPT | Performed by: NURSE PRACTITIONER

## 2020-08-28 PROCEDURE — 70450 CT HEAD/BRAIN W/O DYE: CPT

## 2020-08-28 PROCEDURE — 99285 EMERGENCY DEPT VISIT HI MDM: CPT | Performed by: PHYSICIAN ASSISTANT

## 2020-08-28 PROCEDURE — 85014 HEMATOCRIT: CPT | Performed by: NURSE PRACTITIONER

## 2020-08-28 PROCEDURE — 80053 COMPREHEN METABOLIC PANEL: CPT | Performed by: FAMILY MEDICINE

## 2020-08-28 PROCEDURE — 87081 CULTURE SCREEN ONLY: CPT | Performed by: NURSE PRACTITIONER

## 2020-08-28 PROCEDURE — 85730 THROMBOPLASTIN TIME PARTIAL: CPT | Performed by: FAMILY MEDICINE

## 2020-08-28 PROCEDURE — 96367 TX/PROPH/DG ADDL SEQ IV INF: CPT

## 2020-08-28 PROCEDURE — U0003 INFECTIOUS AGENT DETECTION BY NUCLEIC ACID (DNA OR RNA); SEVERE ACUTE RESPIRATORY SYNDROME CORONAVIRUS 2 (SARS-COV-2) (CORONAVIRUS DISEASE [COVID-19]), AMPLIFIED PROBE TECHNIQUE, MAKING USE OF HIGH THROUGHPUT TECHNOLOGIES AS DESCRIBED BY CMS-2020-01-R: HCPCS | Performed by: NURSE PRACTITIONER

## 2020-08-28 PROCEDURE — 84443 ASSAY THYROID STIM HORMONE: CPT | Performed by: NURSE PRACTITIONER

## 2020-08-28 PROCEDURE — 74177 CT ABD & PELVIS W/CONTRAST: CPT

## 2020-08-28 PROCEDURE — 99222 1ST HOSP IP/OBS MODERATE 55: CPT | Performed by: SURGERY

## 2020-08-28 PROCEDURE — 70496 CT ANGIOGRAPHY HEAD: CPT

## 2020-08-28 PROCEDURE — 81001 URINALYSIS AUTO W/SCOPE: CPT | Performed by: FAMILY MEDICINE

## 2020-08-28 PROCEDURE — 70498 CT ANGIOGRAPHY NECK: CPT

## 2020-08-28 PROCEDURE — 82553 CREATINE MB FRACTION: CPT | Performed by: FAMILY MEDICINE

## 2020-08-28 PROCEDURE — 85025 COMPLETE CBC W/AUTO DIFF WBC: CPT | Performed by: NURSE PRACTITIONER

## 2020-08-28 PROCEDURE — 99291 CRITICAL CARE FIRST HOUR: CPT | Performed by: FAMILY MEDICINE

## 2020-08-28 PROCEDURE — 73080 X-RAY EXAM OF ELBOW: CPT

## 2020-08-28 PROCEDURE — 81003 URINALYSIS AUTO W/O SCOPE: CPT | Performed by: FAMILY MEDICINE

## 2020-08-28 PROCEDURE — 36600 WITHDRAWAL OF ARTERIAL BLOOD: CPT

## 2020-08-28 PROCEDURE — 82550 ASSAY OF CK (CPK): CPT | Performed by: FAMILY MEDICINE

## 2020-08-28 PROCEDURE — 99292 CRITICAL CARE ADDL 30 MIN: CPT | Performed by: FAMILY MEDICINE

## 2020-08-28 PROCEDURE — 87040 BLOOD CULTURE FOR BACTERIA: CPT | Performed by: FAMILY MEDICINE

## 2020-08-28 RX ORDER — LIDOCAINE 50 MG/G
1 PATCH TOPICAL DAILY
Status: DISCONTINUED | OUTPATIENT
Start: 2020-08-29 | End: 2020-09-15 | Stop reason: HOSPADM

## 2020-08-28 RX ORDER — CHLORHEXIDINE GLUCONATE 0.12 MG/ML
15 RINSE ORAL EVERY 12 HOURS SCHEDULED
Status: DISCONTINUED | OUTPATIENT
Start: 2020-08-28 | End: 2020-09-15 | Stop reason: HOSPADM

## 2020-08-28 RX ORDER — SODIUM CHLORIDE, SODIUM GLUCONATE, SODIUM ACETATE, POTASSIUM CHLORIDE, MAGNESIUM CHLORIDE, SODIUM PHOSPHATE, DIBASIC, AND POTASSIUM PHOSPHATE .53; .5; .37; .037; .03; .012; .00082 G/100ML; G/100ML; G/100ML; G/100ML; G/100ML; G/100ML; G/100ML
125 INJECTION, SOLUTION INTRAVENOUS CONTINUOUS
Status: DISCONTINUED | OUTPATIENT
Start: 2020-08-28 | End: 2020-08-31

## 2020-08-28 RX ORDER — ACETAMINOPHEN 325 MG/1
650 TABLET ORAL EVERY 6 HOURS PRN
Status: DISCONTINUED | OUTPATIENT
Start: 2020-08-28 | End: 2020-09-15 | Stop reason: HOSPADM

## 2020-08-28 RX ORDER — SODIUM CHLORIDE, SODIUM GLUCONATE, SODIUM ACETATE, POTASSIUM CHLORIDE, MAGNESIUM CHLORIDE, SODIUM PHOSPHATE, DIBASIC, AND POTASSIUM PHOSPHATE .53; .5; .37; .037; .03; .012; .00082 G/100ML; G/100ML; G/100ML; G/100ML; G/100ML; G/100ML; G/100ML
1000 INJECTION, SOLUTION INTRAVENOUS ONCE
Status: COMPLETED | OUTPATIENT
Start: 2020-08-28 | End: 2020-08-28

## 2020-08-28 RX ORDER — SODIUM BICARBONATE 84 MG/ML
50 INJECTION, SOLUTION INTRAVENOUS ONCE
Status: DISCONTINUED | OUTPATIENT
Start: 2020-08-28 | End: 2020-08-28

## 2020-08-28 RX ADMIN — SODIUM BICARBONATE 100 ML/HR: 84 INJECTION, SOLUTION INTRAVENOUS at 10:40

## 2020-08-28 RX ADMIN — IOHEXOL 85 ML: 350 INJECTION, SOLUTION INTRAVENOUS at 17:55

## 2020-08-28 RX ADMIN — SODIUM CHLORIDE, SODIUM GLUCONATE, SODIUM ACETATE, POTASSIUM CHLORIDE, MAGNESIUM CHLORIDE, SODIUM PHOSPHATE, DIBASIC, AND POTASSIUM PHOSPHATE 1000 ML: .53; .5; .37; .037; .03; .012; .00082 INJECTION, SOLUTION INTRAVENOUS at 17:42

## 2020-08-28 RX ADMIN — LEVETIRACETAM 500 MG: 100 INJECTION, SOLUTION INTRAVENOUS at 22:48

## 2020-08-28 RX ADMIN — SODIUM CHLORIDE, SODIUM GLUCONATE, SODIUM ACETATE, POTASSIUM CHLORIDE AND MAGNESIUM CHLORIDE 125 ML/HR: 526; 502; 368; 37; 30 INJECTION, SOLUTION INTRAVENOUS at 16:57

## 2020-08-28 RX ADMIN — METRONIDAZOLE 500 MG: 500 INJECTION, SOLUTION INTRAVENOUS at 17:43

## 2020-08-28 RX ADMIN — CEFEPIME HYDROCHLORIDE 1000 MG: 1 INJECTION, POWDER, FOR SOLUTION INTRAMUSCULAR; INTRAVENOUS at 22:48

## 2020-08-28 RX ADMIN — SODIUM CHLORIDE 1000 ML: 0.9 INJECTION, SOLUTION INTRAVENOUS at 09:32

## 2020-08-28 RX ADMIN — PIPERACILLIN SODIUM AND TAZOBACTAM SODIUM 3.38 G: 3; .375 INJECTION, POWDER, LYOPHILIZED, FOR SOLUTION INTRAVENOUS at 09:52

## 2020-08-28 RX ADMIN — IOHEXOL 100 ML: 350 INJECTION, SOLUTION INTRAVENOUS at 08:58

## 2020-08-28 NOTE — H&P
H&P- Deepika Chen 1945, 66 y o  male MRN: [id number]    Unit/Bed#: ED 21 Encounter: [id number]    Primary Care Provider: Marcio Kaminski MD   Date and time admitted to hospital: 8/28/2020 12:43 PM        [address]  - Suspected unwitnessed fall with unknown loss of consciousness and the below noted injuries  - Fall precautions  - Geriatric Medicine consultation secondary to baseline dementia, acute encephalopathy, and ISAR >2   - PT and OT evaluation and treatment as indicated when appropriate  - Case Management consultation for disposition planning/expected post acute care facility need  * TBI (traumatic brain injury) ([facility] [address] )  Assessment & Plan  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 as well as suspected trace subarachnoid hemorrhage in the left parietal region on initial CT scan  - On repeat CT scan of head secondary to concerning pupillary exam change following transferred to [facility], there appears to be slight increase of the bilateral intraventricular hemorrhage, blossoming of the left parietal subarachnoid hemorrhage, bilateral subdural hematomas and suspected bleeding near the brainstem with formal/final radiologic read pending   - Neurosurgery consultation and recommendations pending   - Admit to ICU for frequent neurologic exams in light of worsening CT scan of the head  - Neurologic exams every 1 hour   - Initiate Keppra 500 mg every 12 hours for seizure prophylaxis  - Avoid all anti-platelet and anticoagulant medications  Only currently available medication list is from February of 2019 from the patient's primary care provider office with no listed anti-platelet and anticoagulant medications, and the patient's coag panel is within normal limits currently    - Repeat CT scan of the head on 08/29/2020 or sooner if change in neurologic status and or decline in GCS >2   - PT and OT evaluation and treatment as indicated when appropriate  - Plan to keep patient NPO pending neurosurgery evaluation  If cleared for diet with no intervention plan by Neurosurgery, patient should have a dysphagia/swallow evaluation prior to initiation of oral diet in setting of traumatic brain injury and encephalopathy  Encephalopathy acute  Assessment & Plan  - Acute encephalopathy, likely multifactorial in setting of traumatic brain injury, multiple wounds with rhabdomyolysis and acute kidney injury  Cannot rule out infectious etiology in patient with multiple wounds and leukocytosis  No documented fever since arrival to healthcare facility   - Urinalysis not suggestive of UTI; urine culture pending   - Await results of blood cultures  - Monitor temperature trend and trend leukocytosis with daily CBC   - Inpatient wound care consult for assistance with local wound care to multiple wound sites  Monitor for signs/symptoms of cellulitis or soft tissue infection   - Delirium precautions  - Geriatric Medicine consultation   - Review home medication list as well as past medical history when available  Patient unable to provide history at this time  No known contacts available for the patient at the time of admission and patient's primary care physician offices were closed on Fridays with no on call provider immediately available  - Treatment of known acute and underlying conditions as documented  - Plan to keep patient NPO pending neurosurgery evaluation  If cleared for diet with no intervention plan by Neurosurgery, patient should have a dysphagia/swallow evaluation prior to initiation of oral diet in setting of traumatic brain injury and encephalopathy  Traumatic rhabdomyolysis [facility])  Assessment & Plan  - Traumatic rhabdomyolysis, present on admission, with associated NAVID    Suspect patient suffered a fall and/or multiple falls with possible downtime of unknown duration based on multiple wounds, some appearing to be pressure related, during admission evaluation   - Continue IV fluid hydration   - Monitor urine output with urinary catheter in place for accurate I/Os  - Monitor renal function and CK level  - Frequent repositioning, every 2 hours  Abrasions of multiple sites  Assessment & Plan  - Abrasions to multiple sites including all 4 extremities and his back  - Frequent repositioning, every 2 hours  - Inpatient wound care consult for assistance with local wound care to multiple wound sites  - Monitor for signs/symptoms of cellulitis or soft tissue infection  NAVID (acute kidney injury) ([facility] [address] )  Assessment & Plan  - Acute kidney injury, present on admission   - Continue IV fluid hydration   - Monitor urine output with urinary catheter in place for accurate I/Os  - Monitor renal function and electrolytes  - Avoid nephrotoxic medications and hypotension  Dementia [facility])  Assessment & Plan  - Chronic/baseline history of dementia  - Delirium precautions  - Geriatric Medicine consultation for evaluation and recommendations  Pressure injury of back, stage 1  Assessment & Plan  - Suspected stage I pressure injury of the back  - Inpatient wound care consult for assistance with local wound care to multiple wound sites  Type 2 diabetes mellitus, without long-term current use of insulin [facility])  Assessment & Plan    Lab Results   Component Value Date    HGBA1C 5 7 (H) 03/16/2020     - Documented history of type 2 diabetes mellitus with questionable use of metformin per only available medication list from February 2018  - Currently without hyperglycemia on initial lab workup and most recent hemoglobin A1c from March 2020 was 5 7   - Will hold off on any medication therapy for diabetes hyperglycemia at this time and check blood sugars every 6 hours while NPO   - Obtain hemoglobin A1c this admission   - Try to obtain additional records whenever  or PCP become available          H&P Exam - Trauma   Neel Mitchell  66 y o  male MRN: [id number]  Unit/Bed#: ED 21 Encounter: [id number]    Assessment/Plan   Trauma Alert: Evaluation and Other Transfer from [address] of Arrival: Ambulance  Trauma Team: Attending Dr Froylan Dee and BARRERA Anderson PA-C  Consultants: Neurosurgery: Dr Karma Vincent  Time of Arrival: 14:10    Trauma Active Problems and Trauma Plan: See above  Chief Complaint: "I feel good "    History of Present Illness   HPI:  Neel Mitchell  is a 66 y o  male who presents as a transfer from [facility] with a traumatic brain injury, multiple wounds on his extremities and back, rhabdomyolysis and acute kidney injury as well as encephalopathy  Patient unable to provide any meaningful history and only repeats that he is feeling pretty good  Minimal history available from medical records as the patient has not been in the [facility] system before and has no Care everywhere chart  Records from [facility] also are limited with no known patient emergency contact information  Patient's primary care provider office is far also closed on Fridays with no on-call provider listed to provide additional history  Mechanism:Fall    Review of Systems   Unable to perform ROS: Mental status change       12-point, complete review of systems was reviewed and negative except as stated above  Historical Information   History is unobtainable from the patient due to dementia and encephalopathy  Efforts to obtain history included the following sources: other medical personnel, obtained from other records    No past medical history on file  No past surgical history on file    Social History   Social History     Substance and Sexual Activity   Alcohol Use Not on file     Social History     Substance and Sexual Activity   Drug Use Not on file     Social History     Tobacco Use   Smoking Status Not on file     No existing history information found   No existing history information found  There is no immunization history on file for this patient  Last Tetanus:  Unknown  Family History: Non-contributory  Unable to obtain/limited by current clinical condition, lack of emergency contact and lack of prior medical records      Meds/Allergies   all current active meds have been reviewed    No Known Allergies      PHYSICAL EXAM    PE limited by:  Encephalopathy and limited cooperation by the patient  Objective   Vitals:   First set: Temperature: (!) 97 °F (36 1 °C) (08/28/20 0910)  Pulse: 92 (08/28/20 1250)  Respirations: 18 (08/28/20 1250)  Blood Pressure: 133/78 (08/28/20 1250)    Primary Survey:   (A) Airway:  Patent and intact  (B) Breathing:  Clear and equal bilaterally  (C) Circulation: Pulses:   normal, carotid  2/4, pedal  2/4, radial  2/4 and femoral  2/4  (D) Disabliity:  GCS Total:  13, Eye Opening:   Spontaneous = 4, Motor Response: Obeys commands = 6 and Verbal Response:  Inappropriate words = 3  (E) Expose:  Completed    Secondary Survey: (Click on Physical Exam tab above)  Physical Exam  Vitals signs and nursing note reviewed  Constitutional:       General: He is awake  He is not in acute distress  Appearance: He is obese  He is ill-appearing  He is not toxic-appearing or diaphoretic  HENT:      Head: Normocephalic  Right Ear: Tympanic membrane, ear canal and external ear normal       Left Ear: Tympanic membrane, ear canal and external ear normal       Nose: Nose normal  No nasal deformity, septal deviation, nasal tenderness or congestion  Mouth/Throat:      Mouth: Mucous membranes are moist       Pharynx: Oropharynx is clear  Eyes:      Extraocular Movements: Extraocular movements intact  Conjunctiva/sclera: Conjunctivae normal       Comments: Right pupil is round, 4-5 mm and nonreactive to light  Left pupil is round, 3-4 mm and reactive to light     Neck:      Musculoskeletal: Normal range of motion and neck supple  No spinous process tenderness or muscular tenderness  Cardiovascular:      Rate and Rhythm: Normal rate and regular rhythm  Pulses: Normal pulses  Carotid pulses are 2+ on the right side and 2+ on the left side  Radial pulses are 2+ on the right side and 2+ on the left side  Femoral pulses are 2+ on the right side and 2+ on the left side  Dorsalis pedis pulses are 2+ on the right side and 2+ on the left side  Heart sounds: Normal heart sounds  No murmur  No friction rub  No gallop  Pulmonary:      Effort: Pulmonary effort is normal  No tachypnea, accessory muscle usage or respiratory distress  Breath sounds: Normal breath sounds and air entry  No stridor  No decreased breath sounds, wheezing, rhonchi or rales  Chest:      Chest wall: No deformity, tenderness or crepitus  Abdominal:      General: Bowel sounds are normal  There is no distension  Palpations: Abdomen is soft  There is no mass  Tenderness: There is no abdominal tenderness  There is no guarding or rebound  Genitourinary:     Comments: Urinary catheter present  Musculoskeletal: Normal range of motion  General: Signs of injury present  No swelling, tenderness or deformity  Cervical back: He exhibits no tenderness, no deformity and no pain  Thoracic back: He exhibits no tenderness, no deformity and no pain  Lumbar back: He exhibits no tenderness, no deformity and no pain  Right lower leg: No edema  Left lower leg: No edema  Skin:     General: Skin is warm and dry  Capillary Refill: Capillary refill takes less than 2 seconds  Coloration: Skin is not jaundiced or pale  Findings: Abrasion (Abrasions to multiple sites including left upper back, bilateral upper and lower extremities diffusely in multiple areas) and erythema (Erythema to large area of left back consistent with pressure injury    Erythema on the buttocks concerning for pressure injury  ) present  No lesion or rash  Neurological:      General: No focal deficit present  Mental Status: He is alert  He is disoriented  GCS: GCS eye subscore is 4  GCS verbal subscore is 3  GCS motor subscore is 6  Sensory: No sensory deficit  Motor: Motor function is intact  No weakness  Comments: Patient able to move all 4 extremities equally  Detailed neurologic exam limited due to lack of patient cooperation     Psychiatric:         Mood and Affect: Mood normal          Invasive Devices     Peripheral Intravenous Line            Peripheral IV 08/28/20 Left Antecubital less than 1 day    Peripheral IV 08/28/20 Left Hand less than 1 day          Drain            Urethral Catheter Temperature probe 16 Fr  less than 1 day                Lab Results:   Results: I have personally reviewed pertinent reports   , BMP/CMP:   Lab Results   Component Value Date    SODIUM 143 08/28/2020    K 3 9 08/28/2020     08/28/2020    CO2 22 08/28/2020    BUN 41 (H) 08/28/2020    CREATININE 1 16 08/28/2020    CALCIUM 8 9 08/28/2020     (H) 08/28/2020    ALT 47 08/28/2020    ALKPHOS 84 08/28/2020    EGFR 60 08/28/2020   , CBC:   Lab Results   Component Value Date    WBC 23 10 (H) 08/28/2020    HGB 13 0 (L) 08/28/2020    HCT 38 8 (L) 08/28/2020    MCV 91 08/28/2020     08/28/2020    MCH 30 5 08/28/2020    MCHC 33 6 08/28/2020    RDW 14 2 08/28/2020    MPV 7 5 (L) 08/28/2020   , Coagulation:   Lab Results   Component Value Date    INR 1 21 (H) 08/28/2020   , Urinalysis:   Lab Results   Component Value Date    COLORU Yellow 08/28/2020    CLARITYU Slightly Cloudy (A) 08/28/2020    SPECGRAV >=1 030 (H) 08/28/2020    PHUR 5 0 08/28/2020    LEUKOCYTESUR Negative 08/28/2020    NITRITE Negative 08/28/2020    GLUCOSEU Negative 08/28/2020    KETONESU 40 (2+) (A) 08/28/2020    BILIRUBINUR 1+ (A) 08/28/2020    BLOODU 3+ (A) 08/28/2020   , CK:   Lab Results   Component Value Date    CKTOTAL 7,212 (H) 08/28/2020   , Troponin:   Lab Results   Component Value Date    TROPONINI 0 11 (H) 08/28/2020   , ABG:   Lab Results   Component Value Date    PHART 7 270 (L) 08/28/2020    FQD4SZG 41 9 08/28/2020    PO2ART 138 0 (H) 08/28/2020    YVW5YBA 18 6 (L) 08/28/2020    BEART -7 6 (L) 08/28/2020    SOURCE Radial, Right 08/28/2020    and ISTAT: No components found for: VBG  Imaging/EKG Studies: Results: I have personally reviewed pertinent reports     and I have personally reviewed pertinent films in PACS  Other Studies: N/A    Code Status: No Order  Advance Directive and Living Will:      Power of :    POLST:

## 2020-08-28 NOTE — CONSULTS
Consultation - Geriatric Medicine   Nile Julien  66 y o  male MRN: [id number]  Unit/Bed#: ED 21 Encounter: [id number]      Assessment/Plan     Subdural hematoma  -noted on CT head on admission  -suspected unwitnessed fall S patient was found down outside with various abrasions in different regions of body  -patient transferred from [facility], no family or information about events leading to admission available in chart, no available contact the patient to get further information  -Nsx following, continue neuro checks per protocol  -AC/AP on hold, no DVT prophylaxis until cleared by Neurosurgery  -currently on Keppra for seizure prophylaxis    Subarachnoid hemorrhage  -confirmed on CT head on admission  -CTA revealed no aneurysm or significant stenosis  -neurosurgery following, plan as above    Traumatic brain injury  -unknown baseline, no emergency contact on chart, called PCP on record but office is closed, contacted a Dr Cooper Tom who had seen pt in past as had lab ordered by him and he was able to state that he had seen pt in past for prostate issues, at that time pt came to apt alone but seemed to have some cognitive deficit but was able to get himself to and from appointments and make his own medical decisions and that the patient's presentation today is not his baseline from his prior encounters with him  -continue frequent neuro checks and supportive cares  -limit use of sedating medications to ensure clear neuro exam    Suspected fall  -patient presented with diffuse abrasions on arms legs knees, back  -pt unable to provide any details about incident and no witnesses to provide further information     Acute metabolic encephalopathy  -multifactorial including subarachnoid hemorrhage, traumatic brain injury, and traumatic rhabdomyolysis  -continue supportive cares, treatment of underlying metabolic derangements and frequent reorientation and redirection as appropriate  -maintain normal circadian rhythm  -ensure pain is well controlled  -monitor for fecal and urinary retention    Traumatic rhabdomyolysis  -total CK 12,990 on admission  -continue fluid resuscitation per trauma  -monitor renal function    Suspected cognitive impairment at baseline  -unable to corroborate information as patient's primary care provider office is closed and there are no other contacts for patient to provide further history, will re-attempt contact PCP on Monday for further information    Multiple abrasions/skin lesions  -continue local wound care per trauma    History of Present Illness   Physician Requesting Consult: Brooks Regan MD  Reason for Consult / Principal Problem:  Encephalopathy  Hx and PE limited by:  Patient encephalopathy  Additional history obtained from:  Chart review, unable to reach PCP as their office was closed office close    HPI: Jesus Venegas  is a 66y o  year old male with unknown past medical history as patient is unable to provide any meaningful information and there is limited information in his chart, no care everywhere records, no family or caregivers or people with knowledge of patient able to be located, no emergency contact in his record, and PCP office was closed  Patient is admitted to Trauma as transfer from department where he presented after being found down for unknown him at time with multiple diffuse body abrasions and confusion found to have subarachnoid and subdural hemorrhages  He seen examined at the bedside emergency department where he is lying resting comfortably, he awakens to voice and says I am tired but becomes extremely restless and confused and unable information or further follow commands  Despite extensive attempts unable to reach patient's primary care provider or find other corroborating information or past history about patient      Inpatient consult to Gerontology  Consult performed by: Madeleine Cardona DO  Consult ordered by: GILBERT Torres        Review of Systems   Unable to perform ROS: Mental status change (Patient states I am tired but unable to participate further with ROS)     Past medical, surgical, family, and social history unable to be obtained as patient is encephalopathic and unable to provide information, known emergency contact was able to be located, patient's PCP office was closed and was unable to contacted for further information and no other information was available despite extensive chart review and extensive efforts to obtain outside records    Meds/Allergies   Patient unable to state due to encephalopathy    No Known Allergies    Objective   No intake or output data in the 24 hours ending 08/28/20 1824  Invasive Devices     Peripheral Intravenous Line            Peripheral IV 08/28/20 Right Antecubital less than 1 day    Peripheral IV 08/28/20 Right Hand less than 1 day          Drain            Urethral Catheter Temperature probe 16 Fr  less than 1 day              Physical Exam  Vitals signs and nursing note reviewed  Constitutional:       Appearance: He is ill-appearing  Comments: Confused   HENT:      Head: Normocephalic  Comments: Mucous membranes dry     Nose: Nose normal       Mouth/Throat:      Comments: Mucous membranes dry, poor dentition  Eyes:      Comments: Conjunctival injection bilaterally   Neck:      Comments: Trachea midline  Cardiovascular:      Rate and Rhythm: Tachycardia present  Pulses: Normal pulses  Pulmonary:      Comments: Course breath sounds, no respiratory distress  Abdominal:      General: Bowel sounds are normal  There is no distension  Palpations: Abdomen is soft  Tenderness: There is no abdominal tenderness  Musculoskeletal:      Comments: Reduced overall muscle mass  Moves all 4 extremities spontaneously   Skin:     General: Skin is warm and dry        Comments: Diffuse abrasions and ecchymosis most concentrated on arms, knees, shoulders   Neurological:      Comments: Patient is confused, cannot follow commands   Psychiatric:      Comments: Unable to fully evaluate due to altered mental status       Lab Results:   I have personally reviewed pertinent lab results including the following:  -total CK 12 90  -troponin 0 17  -lactic acid 1 7  -hemoglobin 12 4, hematocrit 37 6, WBC 19 69, platelets 912  -novel Coronavirus 19-negative  -sodium 143, potassium 3 4 chloride 106, CO2 22, BUN 41, creatinine 1 16, glucose 94, calcium 8 9, , ALT 47, alk-phos 84, total protein 6 2, albumin 4 0, T bili 4 3, GFR 60    I have personally reviewed the following imaging study reports in PACS:    CT head without contrast 08/28/2020:  Small amount layering intraventricular hemorrhage occipital horns bilaterally  CT C-spine without contrast 08/28/2020:  No cervical spine fracture or traumatic malalignment   CT lumbar spine without contrast 08/28/2020:  Mild multilevel degenerative spondylosis, no acute traumatic injury identified, small right-sided disc protrusion at L4-5, nonobstructive upper pole left renal calculus  CT chest abdomen pelvis without contrast 08/28/2020:  No acute traumatic injury identified in chest, no interim from a solid organ injury, ill-defined hypoattenuating foci small bowel mesenteric raises possibility of small mesenteric hematomas, question indeterminate nondisplaced fracture of right anterior 5th and 6th ribs, prostatomegaly  Right knee x-ray 08/28/2020:  No acute osseous abnormality  Left knee x-ray 08/28/2020:  Healed fracture deformities proximal tibia and fibula, no acute osseous abnormality  CT head without contrast 08/28/2020:  Small amount acute intracranial hemorrhage, slightly increased from prior study with large amount of intraventricular blood left subarachnoid hemorrhage noted falls bilateral small low-density subdural collections with no critical mass effect or acute infarction  CT head and neck with without contrast 08/28/2020:  Grossly stable multifocal extra-axial hemorrhage, stable hypoattenuating subdural collection, no significant mass effect, stable small layering blood within posterior horn of bilateral ventricles, being major vasculature of [place] without significant stenosis, no aneurysm    Therapies:   PT: pending  OT: pending     VTE Prophylaxis: Reason for no pharmacologic prophylaxis SAH/SDH    Code Status: Level 1 - Full Code  Advance Directive and Living Will:      Power of :    POLST:      Family and Social Support: None able to be located    Goals of Care:  Patient could not state due to altered mental status

## 2020-08-28 NOTE — ED PROVIDER NOTES
Emergency Department Trauma Note  Jyothi Pérez  66 y o  male MRN: [id number]  Unit/Bed#: TR 05/TR 05 Encounter: [id number]      Trauma Alert: Trauma Acuity: Trauma Evaluation  Model of Arrival: Mode of Arrival: ALS via Trauma Squad Name and Number: [facility]  Trauma Team: Current Providers  Attending Provider: Hailey Denson MD  Registered Nurse: Obdulia Hogue RN  ED Technician: Jakob Cobb  Consultants: Other: Trauma   Time Called [id number]         ADT order placed at [phone], [facility] called back at 10:06 discuss with Dr Nick Woodall accepted transfer to [facility]              History of Present Illness     Chief Complaint: No chief complaint on file  HPI:  Jyothi Pérez  is a 66 y o  male who presents with EMS with altered mental status and hypothermia  Patient is not able to provide history due to dementia the history is taken from previous record and EMS  Upon arrival to the ED  As per EMS patient last seen normal around 6:00 p m  Yesterday when his caretaker left the house  EMS states that patient was found outside by his neighbor who brought him the house  Patient was found to be hypothermic and slightly altered from his baseline  C-collar was placed prior to arrival   GCS 15  Patient does states that that he lost his balance and fell  He keep verbalizing that I feel fine   Patient is denying any chest pain or shortness of breath  Denying any headache blurry vision double vision at this time  Mechanism:Details of Incident: patient found on ground outside by care taker  last known well time last night at 1800            History provided by:  EMS personnel and patient  History limited by:  Dementia   used: No      Review of Systems   Constitutional: Negative for activity change, appetite change, diaphoresis, fatigue and fever  HENT: Negative  Negative for congestion, drooling and nosebleeds  Eyes: Negative      Respiratory: Negative for shortness of breath and wheezing  Cardiovascular: Negative  Gastrointestinal: Negative  Endocrine: Negative  Genitourinary: Negative  Musculoskeletal:        Bilateral knee pain    Skin: Negative for color change  Neurological: Positive for weakness  Negative for dizziness and speech difficulty  Hematological: Negative  Psychiatric/Behavioral: Negative for agitation, behavioral problems, confusion and sleep disturbance  The patient is not nervous/anxious  Historical Information     Immunizations: There is no immunization history on file for this patient  No past medical history on file  No family history on file  No past surgical history on file  Social History     Tobacco Use    Smoking status: Not on file   Substance Use Topics    Alcohol use: Not on file    Drug use: Not on file     No existing history information found  No existing history information found  Family History: non-contributory    Meds/Allergies   None       No Known Allergies    PHYSICAL EXAM    PE limited by: None    Objective   Vitals:   First set: Temperature: (!) 97 2 °F (36 2 °C) (08/28/20 1040)  Pulse: 98 (08/28/20 0822)  Respirations: 16 (08/28/20 6790)  Blood Pressure: 122/67 (08/28/20 0822)  SpO2: 96 % (08/28/20 0822)    Primary Survey:   (A) Airway: patent No blood in the oropharynx  (B) Breathing:  Lungs are clear to auscultation bilaterally, spontaneous breathing  (C) Circulation: Pulses:   normal  (D) Disabliity:  GCS Total:  15  (E) Expose:  Completed    Secondary Survey: (Click on Physical Exam tab above)  Physical Exam  Vitals signs and nursing note reviewed  Constitutional:       General: He is not in acute distress  Appearance: He is not ill-appearing  Comments: Slightly confuse hx of dementia   HENT:      Head: Normocephalic and atraumatic        Comments: C-collar in place     Nose: Nose normal       Mouth/Throat:      Mouth: Mucous membranes are moist    Eyes:      Extraocular Movements: Extraocular movements intact  Conjunctiva/sclera: Conjunctivae normal       Pupils: Pupils are equal, round, and reactive to light  Cardiovascular:      Rate and Rhythm: Normal rate and regular rhythm  Pulmonary:      Breath sounds: Decreased breath sounds present  Abdominal:      General: Bowel sounds are normal  There is no distension  Palpations: Abdomen is soft  There is no mass  Musculoskeletal:      Comments: Bilateral Knee:  Erythematous scraped knee   Back: upper back redness, No injury is noted, diffuse abrasion on the bilateral knee and upper back   Skin:     General: Skin is warm  Neurological:      General: No focal deficit present  Mental Status: He is alert  Mental status is at baseline  Psychiatric:         Mood and Affect: Mood normal          Behavior: Behavior normal          Cervical spine cleared by clinical criteria? No (imaging required)      Invasive Devices     Peripheral Intravenous Line            Peripheral IV 08/28/20 Right Antecubital less than 1 day    Peripheral IV 08/28/20 Right Hand less than 1 day          Drain            Urethral Catheter Temperature probe 16 Fr  less than 1 day                Lab Results:   Results Reviewed     Procedure Component Value Units Date/Time    Procalcitonin with AM Reflex [[id number]]  (Abnormal) Collected:  08/28/20 0835    Lab Status:  Final result Specimen:  Blood from Arm, Right Updated:  08/28/20 1552     Procalcitonin 11 93 ng/ml     Procalcitonin Reflex [[id number]]     Lab Status:  No result Specimen:  Blood     Blood culture #1 [[id number]] Collected:  08/28/20 0836    Lab Status:  Preliminary result Specimen:  Blood from Arm, Right Updated:  08/28/20 1301     Blood Culture Received in Microbiology Lab  Culture in Progress  Blood culture #2 [[id number]] Collected:  08/28/20 0930    Lab Status:  Preliminary result Specimen:  Blood from Arm, Left Updated:  08/28/20 1301     Blood Culture Received in Microbiology Lab  Culture in Progress  Lactic acid 2 Hours [[id number]]  (Normal) Collected:  08/28/20 1053    Lab Status:  Final result Specimen:  Blood from Arm, Left Updated:  08/28/20 1113     LACTIC ACID 1 7 mmol/L     Narrative:       Result may be elevated if tourniquet was used during collection      CKMB [[id number]]  (Abnormal) Collected:  08/28/20 0836    Lab Status:  Final result Specimen:  Blood from Arm, Right Updated:  08/28/20 1007     CK-MB Index 1 1 %      CK-MB 76 3 ng/mL     CK (with reflex to MB) [[id number]]  (Abnormal) Collected:  08/28/20 0836    Lab Status:  Final result Specimen:  Blood from Arm, Right Updated:  08/28/20 0942     Total CK 7,212 U/L     Comprehensive metabolic panel [[id number]]  (Abnormal) Collected:  08/28/20 0836    Lab Status:  Final result Specimen:  Blood from Arm, Right Updated:  08/28/20 0936     Sodium 143 mmol/L      Potassium 3 9 mmol/L      Chloride 106 mmol/L      CO2 22 mmol/L      ANION GAP 15 mmol/L      BUN 41 mg/dL      Creatinine 1 16 mg/dL      Glucose 94 mg/dL      Calcium 8 9 mg/dL       U/L      ALT 47 U/L      Alkaline Phosphatase 84 U/L      Total Protein 6 2 g/dL      Albumin 4 0 g/dL      Total Bilirubin 4 30 mg/dL      eGFR 60 ml/min/1 73sq m     Narrative:       [place] guidelines for Chronic Kidney Disease (CKD):     Stage 1 with normal or high GFR (GFR > 90 mL/min/1 73 square meters)    Stage 2 Mild CKD (GFR = 60-89 mL/min/1 73 square meters)    Stage 3A Moderate CKD (GFR = 45-59 mL/min/1 73 square meters)    Stage 3B Moderate CKD (GFR = 30-44 mL/min/1 73 square meters)    Stage 4 Severe CKD (GFR = 15-29 mL/min/1 73 square meters)    Stage 5 End Stage CKD (GFR <15 mL/min/1 73 square meters)  Note: GFR calculation is accurate only with a steady state creatinine    Blood gas, arterial [[id number]]  (Abnormal) Collected:  08/28/20 0918    Lab Status:  Final result Specimen:  Blood from Radial, Right Updated:  08/28/20 9031 pH, Arterial 7 270     pCO2, Arterial 41 9 mm Hg      pO2, Arterial 138 0 mm Hg      HCO3, Arterial 18 6 mmol/L      Base Excess, Arterial -7 6 mmol/L      O2 Content, Arterial 18 0 mL/dL      O2 HGB,Arterial  96 7 %      SOURCE Radial, Right     FRANCESCA TEST Yes     Nasal Cannula 3    Troponin I [[id number]]  (Abnormal) Collected:  08/28/20 0835    Lab Status:  Final result Specimen:  Blood from Arm, Right Updated:  08/28/20 0931     Troponin I 0 11 ng/mL     Lactic acid [[id number]]  (Abnormal) Collected:  08/28/20 0835    Lab Status:  Final result Specimen:  Blood from Arm, Right Updated:  08/28/20 0911     LACTIC ACID 3 5 mmol/L     Narrative:       Result may be elevated if tourniquet was used during collection  Ethanol [[id number]]  (Normal) Collected:  08/28/20 0835    Lab Status:  Final result Specimen:  Blood from Arm, Right Updated:  08/28/20 0905     Ethanol Lvl <10 mg/dL     APTT [[id number]]  (Normal) Collected:  08/28/20 0835    Lab Status:  Final result Specimen:  Blood from Arm, Right Updated:  08/28/20 0901     PTT 30 seconds     Protime-INR [[id number]]  (Abnormal) Collected:  08/28/20 0835    Lab Status:  Final result Specimen:  Blood from Arm, Right Updated:  08/28/20 0901     Protime 15 2 seconds      INR 1 21    Urine Microscopic [[id number]]  (Abnormal) Collected:  08/28/20 0837    Lab Status:  Final result Specimen:  Urine, Clean Catch Updated:  08/28/20 0859     RBC, UA 4-10 /hpf      WBC, UA 10-20 /hpf      Epithelial Cells Occasional /hpf      Bacteria, UA Moderate /hpf      Fine granular casts 0-3 /lpf      COARSE GRANULAR CASTS 10-25 /lpf      MUCUS THREADS Occasional     WBC Casts, UA 0-3 /lpf     Urine culture [[id number]] Collected:  08/28/20 0837    Lab Status:   In process Specimen:  Urine, Clean Catch Updated:  08/28/20 0859    UA w Reflex to Microscopic w Reflex to Culture [[id number]]  (Abnormal) Collected:  08/28/20 0837    Lab Status:  Final result Specimen:  Urine, Clean Catch Updated:  08/28/20 0848     Color, UA Yellow     Clarity, UA Slightly Cloudy     Specific Gravity, UA >=1 030     pH, UA 5 0     Leukocytes, UA Negative     Nitrite, UA Negative     Protein, UA 2+ mg/dl      Glucose, UA Negative mg/dl      Ketones, UA 40 (2+) mg/dl      Urobilinogen, UA 1 0 E U /dl      Bilirubin, UA 1+     Blood, UA 3+    CBC and differential [[id number]]  (Abnormal) Collected:  08/28/20 0834    Lab Status:  Final result Specimen:  Blood from Arm, Right Updated:  08/28/20 0848     WBC 23 10 Thousand/uL      RBC 4 27 Million/uL      Hemoglobin 13 0 g/dL      Hematocrit 38 8 %      MCV 91 fL      MCH 30 5 pg      MCHC 33 6 g/dL      RDW 14 2 %      MPV 7 5 fL      Platelets 438 Thousands/uL                  Imaging Studies:   Direct to CT: No  XR knee 4+ vw left injury   Final Result by Corinne Mater, MD (08/28 5841)   Healed fracture deformities proximal tibia and fibula      No acute osseous abnormality  Workstation performed: [id number]         XR knee 4+ vw right injury   Final Result by Corinne Mater, MD (08/28 3881)      No acute osseous abnormality  Workstation performed: [id number]         TRAUMA - CT chest abdomen pelvis w contrast   Final Result by Blanca Baca DO (08/28 0335)      1  No acute traumatic injury identified within the chest       2   No intra-abdominal solid organ injury  3   Ill-defined hyperattenuating foci in the small bowel mesentery raises possibility of small mesenteric hematomas  Other considerations include adenopathy or carcinoid tumors although felt somewhat less likely  Attention on follow-up is recommended and    surgical consultation is advised  No free air or evidence of diffuse hemoperitoneum  4  Indeterminate left renal hypodensities  Nonemergent ultrasound correlation recommended  5  Question age-indeterminate nondisplaced fracture of the right anterior fifth and sixth ribs  6  Prostatomegaly        7  Left inguinal hernia containing fat and a portion of sigmoid colon  I personally discussed this study with NIRU MACKAY on 8/28/2020 at 9:49 AM                Workstation performed: [id number]         CT spine lumbar without contrast   Final Result by Alanna Membreno MD (08/28 2677)   Mild multilevel degenerative spondylosis      No acute traumatic injury identified      Small right-sided disc protrusion L4-5      Nonobstructive upper pole left renal calculus            Workstation performed: [id number]         TRAUMA - CT spine cervical wo contrast   Final Result by Zheng Nguyen DO (08/28 1984)      No cervical spine fracture or traumatic malalignment  Workstation performed: [id number]         TRAUMA - CT head wo contrast   Final Result by Zheng Nguyen DO (08/28 0939)      Small amount of layering intraventricular hemorrhage occipital horns bilaterally  Question trace gyriform or subarachnoid blood left parietal region series 2/26         I personally discussed this study with NIRU MACKAY on 8/28/2020 at 9:49 AM                         Workstation performed: [id number]               Procedures  CriticalCare Time  Performed by: Josiane Maloney MD  Authorized by: Josiane Maloney MD     Critical care provider statement:     Critical care time (minutes):  180    Critical care start time:  8/28/2020 8:25 AM    Critical care end time:  8/28/2020 11:30 AM    Critical care time was exclusive of:  Teaching time    Critical care was necessary to treat or prevent imminent or life-threatening deterioration of the following conditions:  Dehydration, metabolic crisis, shock, sepsis, renal failure and trauma    Critical care was time spent personally by me on the following activities:  Blood draw for specimens, obtaining history from patient or surrogate, development of treatment plan with patient or surrogate, discussions with consultants, discussions with primary provider, evaluation of patient's response to treatment, examination of patient, review of old charts, re-evaluation of patient's condition, ordering and review of radiographic studies, ordering and review of laboratory studies, ordering and performing treatments and interventions and interpretation of cardiac output measurements    I assumed direction of critical care for this patient from another provider in my specialty: no               ED Course  ED Course as of Aug 28 1842   Fri Aug 28, 2020   1000 Elevated CK most likely from rhabdomyolysis  Will continue with hydration  Total CK(!): 7,212   1000 Elevated troponin could be secondary to rhabdomyolysis  No EKG changes  Troponin I(!): 0 11   1001 Elevated lactic  Patient is started on antibiotics and IV fluid  LACTIC ACID(!!): 3 5   1007 Case discuss with Dr Jose A Jones accepted pt to [facility]  4559 Metabolic acidosis most likely secondary to rhabdomyolysis  Will start patient on sodium bicarbonate mix with dextrose  pH, Arterial(!): 7 270     C- Collar Removed  Neck is cleared  MDM        Disposition  [facility] Transfer: No  Final diagnoses:   Intraventricular hemorrhage ([address] )   Subarachnoid bleed (HCC)   Rhabdomyolysis   Elevated troponin   Metabolic acidosis   Leukocytosis   Fall, initial encounter   [facility])     Time reflects when diagnosis was documented in both MDM as applicable and the Disposition within this note     Time User Action Codes Description Comment    8/28/2020 10:25 AM Lambert Matar Add [I61 5] Intraventricular hemorrhage ([address] )     8/28/2020 10:26 AM Lambert Matar Add [I60 9] Subarachnoid bleed ([address] )     8/28/2020 10:26 AM Lambert Matar Add [M62 82] Rhabdomyolysis     8/28/2020 10:26 AM Lambert Matar Add [R79 89] Elevated troponin     8/28/2020 10:26 AM Lambert Matar Add [D43 1] Metabolic acidosis     3/98/8091 10:38 AM Lambert Matar Add [D72 829] Leukocytosis     8/28/2020 10:40 AM Lambert Matar Add [C85  XXXA] Fall, initial encounter     8/28/2020 10:40 AM Gretchen Benavides Add [F03 90] Dementia [facility])       ED Disposition     ED Disposition Condition Date/Time Comment    Transfer to Another Facility-In Network  Fri Aug 28, 2020 10:25 AM Theo Cardenas  should be transferred out to [facility]        MD Documentation      Most Recent Value   Patient Condition  The patient has been stabilized such that within reasonable medical probability, no material deterioration of the patient condition or the condition of the unborn child(jose) is likely to result from the transfer   Reason for Transfer  Level of Care needed not available at this facility   Benefits of Transfer  Specialized equipment and/or services available at the receiving facility (Include comment)________________________   Risks of Transfer  Potential for delay in receiving treatment, Potential deterioration of medical condition, Loss of IV, Increased discomfort during transfer, Possible worsening of condition or death during transfer   Accepting Physician  Dr Sam Castellanos Name, [facility] [place]   Sending MD Dr Eros Campo      RN Documentation      [facility] Name, [facility]      Follow-up Information    None       There are no discharge medications for this patient  No discharge procedures on file      PDMP Review     None          ED Provider  Electronically Signed by         Eros Campo MD  08/28/20 4700

## 2020-08-28 NOTE — ASSESSMENT & PLAN NOTE
- Abrasions to multiple sites including all 4 extremities and his back  - Frequent repositioning, every 2 hours  - Inpatient wound care consult for assistance with local wound care to multiple wound sites  - Monitor for signs/symptoms of cellulitis or soft tissue infection

## 2020-08-28 NOTE — ASSESSMENT & PLAN NOTE
- Acute encephalopathy, likely multifactorial in setting of traumatic brain injury, multiple wounds with rhabdomyolysis and acute kidney injury  Cannot rule out infectious etiology in patient with multiple wounds and leukocytosis  No documented fever since arrival to healthcare facility   - Urinalysis not suggestive of UTI; urine culture pending   - Await results of blood cultures  - Monitor temperature trend and trend leukocytosis with daily CBC   - Inpatient wound care consult for assistance with local wound care to multiple wound sites  Monitor for signs/symptoms of cellulitis or soft tissue infection   - Delirium precautions  - Geriatric Medicine consultation   - Review home medication list as well as past medical history when available  Patient unable to provide history at this time  No known contacts available for the patient at the time of admission and patient's primary care physician offices were closed on Fridays with no on call provider immediately available  - Treatment of known acute and underlying conditions as documented  - Plan to keep patient NPO pending neurosurgery evaluation  If cleared for diet with no intervention plan by Neurosurgery, patient should have a dysphagia/swallow evaluation prior to initiation of oral diet in setting of traumatic brain injury and encephalopathy

## 2020-08-28 NOTE — ASSESSMENT & PLAN NOTE
Lab Results   Component Value Date    HGBA1C 5 7 (H) 03/16/2020     - Documented history of type 2 diabetes mellitus with questionable use of metformin per only available medication list from February 2018  - Currently without hyperglycemia on initial lab workup and most recent hemoglobin A1c from March 2020 was 5 7   - Will hold off on any medication therapy for diabetes hyperglycemia at this time and check blood sugars every 6 hours while NPO   - Obtain hemoglobin A1c this admission   - Try to obtain additional records whenever  or PCP become available

## 2020-08-28 NOTE — CONSULTS
[facility]  66 y o  male MRN: [id number]  Unit/Bed#: ED 21 Encounter: [id number]      -------------------------------------------------------------------------------------------------------------  Chief Complaint: offers none, repetitive, reports "I feel good"    History of Present Illness   HX and PE limited by: altered mental status   Buddy Guevara  is a 66 y o  male who presents as a transfer from [facility] with SAH  Per chart review, patient was last seen normal last evening when his caretaker left  He was found this morning by his neighbor or caretaker outside and EMS was called  He was altered on EMS evaluation and hypothermic and an infectious work-up was initiated as well as CT scans  He was found to have a large SAH with blood around the brainstem  He is unable to recall any of his medications and is an unreliable historian at this time  His coagulation studies were mildly elevated as well has as his liver studies  It remains unclear if he is on any blood thinners  He had repeat CT head completed which revealed some expansion  History obtained from chart review    -------------------------------------------------------------------------------------------------------------  Assessment and Plan:    Neuro:    Diagnosis: SAH, metabolic encephalopathy   o Plan: some expansion noted on repeat head CT   o CTA head Neck to evaluate possible vascular source also   o Keppra 500 BID  o Q 1 hour neuro checks   o Neurosurgery evaluation   o Hold all AC/AP agents   o Delirium precautions  o geriatrics consultation    Diagnosis: analgesia  o Plan: denies pain, PRN tylenol for headache       CV:    Diagnosis: elevated troponin, possible syncope?   o Plan: unclear past medical history  o NSR without ST changes on ECG  o Trop 0 11 - trend given unclear circumstances of events   o Telemetry   o MAP goal > 65 SBP < 160       Pulm:   Diagnosis: age indeterminate rib fractures o Plan: rib fracture protocol   o O2 sat goal > 92%  o pulm toilet, IS, flutter valve   o No obvious pain - will hold off on narcotics and monitor as may be subacute or chronic   o Repeat CXR in AM       GI:    Diagnosis: mesenteric hematoma  o Plan: serial abdominal exams   o Trend HGB  o NPO - consider advance diet if remains stable  o Dysphagia eval prior to advancing diet   o Bowel regimen   o Consider stress ulcer prophylaxis     Elevated Liver enzymes, unclear etiology  Trend      :    Diagnosis: traumatic rhabdomyolysis   o Plan: isolyte at 125ml/hr  o Repeat CK in AM  o Avoid nephrotic medications  o Questionable kidney hematoma - trend HGB  o Monitor UOP   o Prostate enlargement -urinary retention protocol       F/E/N:    Plan: NPO, Isolyte at 125 ml/hr    Replete lytes PRN   Metabolic acidosis - bicarb 18  AG 15   Fluid resuscitate and monitor kidney function    1 L Isolyte, recheck labs       Heme/Onc:    Diagnosis: coagulopathy   o Plan: suspect related to liver function however unclear if uses any blood thinners or antiplatelet agents  o Consider prophylactic reversal given expansion of SAH  o Hold lovenox given SAH, SCDs for DVT prophylaxis       Endo:    Diagnosis:   o Plan: no clear history of DM, monitor glucose   o Add SSI if needed, goal < 180   o Check thyroid studies given AMS       ID:    Diagnosis: leukocytosis   o Plan: unclear etiology  o UA unremarkable  o Afebrile, LA resolved   o Blood cultures drawn - follow  o Received zosyn at outside hospital; change to cefepime and flagyl and MRSA swab for +/1 vanco  o Evaluate for possible source of infection   o procal        MSK/Skin:    Diagnosis: multiple abrasions/bruising in carious stages of healing  o Plan: XRay bilateral knees - no fracture  o Wound consult  o PT/OT   o Local skin care, offload     Disposition: Admit to Critical Care   Code Status: Level 1 - Full Code  --------------------------------------------------------------------------------------------------------------  Review of Systems   Reason unable to perform ROS: altered mental status, answers "I feel good" to all questions        A 12-point, complete review of systems was reviewed and negative except as stated above     Physical Exam  Constitutional:       General: He is not in acute distress  HENT:      Head: Normocephalic  Right Ear: External ear normal       Left Ear: External ear normal    Eyes:      General:         Right eye: No discharge  Left eye: No discharge  Pupils: Pupils are equal, round, and reactive to light  Neck:      Musculoskeletal: No neck rigidity or muscular tenderness  Cardiovascular:      Rate and Rhythm: Normal rate and regular rhythm  Pulses: Normal pulses  Heart sounds: Normal heart sounds  Pulmonary:      Effort: Pulmonary effort is normal  No respiratory distress  Breath sounds: Normal breath sounds  No stridor  No wheezing, rhonchi or rales  Abdominal:      General: Abdomen is flat  Bowel sounds are normal  There is no distension  Palpations: There is no mass  Tenderness: There is no abdominal tenderness  Musculoskeletal:         General: Tenderness present  No swelling or deformity  Comments: Bruising, swelling BL knees   Skin:     General: Skin is warm and dry  Comments: Abrasions right arm, shoulder, elbow, knee, hip, left knee with superficial erythema and serous drainage    Neurological:      General: No focal deficit present  Mental Status: He is alert        Comments: Moves all extremities without apparent lateralizing symptoms, appears to have receptive aphasia - responds in full sentences but inappropriate responses and repetitive        --------------------------------------------------------------------------------------------------------------  Vitals:   Vitals:    08/28/20 1335 08/28/20 1350 08/28/20 1420 08/28/20 1450   BP: 136/69 120/58 121/62 115/65   Pulse: 90 90 88 92   Resp: 18 20 16 22   Temp:  98 6 °F (37 °C) 98 6 °F (37 °C) 99 °F (37 2 °C)   TempSrc:       SpO2: 97% 97% 96% 97%     Temp  Min: 97 °F (36 1 °C)  Max: 99 °F (37 2 °C)        There is no height or weight on file to calculate BMI  N/A    Laboratory and Diagnostics:  Results from last 7 days   Lab Units 08/28/20  0834   WBC Thousand/uL 23 10*   HEMOGLOBIN g/dL 13 0*   HEMATOCRIT % 38 8*   PLATELETS Thousands/uL 280   BANDS PCT % 17*   MONO PCT % 3*     Results from last 7 days   Lab Units 08/28/20  0836   SODIUM mmol/L 143   POTASSIUM mmol/L 3 9   CHLORIDE mmol/L 106   CO2 mmol/L 22   ANION GAP mmol/L 15*   BUN mg/dL 41*   CREATININE mg/dL 1 16   CALCIUM mg/dL 8 9   GLUCOSE RANDOM mg/dL 94   ALT U/L 47   AST U/L 130*   ALK PHOS U/L 84   ALBUMIN g/dL 4 0   TOTAL BILIRUBIN mg/dL 4 30*          Results from last 7 days   Lab Units 08/28/20  0835   INR  1 21*   PTT seconds 30      Results from last 7 days   Lab Units 08/28/20  0835   TROPONIN I ng/mL 0 11*     Results from last 7 days   Lab Units 08/28/20  1053 08/28/20  0835   LACTIC ACID mmol/L 1 7 3 5*     ABG:  Results from last 7 days   Lab Units 08/28/20  0918   PH ART  7 270*   PCO2 ART mm Hg 41 9   PO2 ART mm Hg 138 0*   HCO3 ART mmol/L 18 6*   BASE EXC ART mmol/L -7 6*   ABG SOURCE  Radial, Right     VBG:  Results from last 7 days   Lab Units 08/28/20  0918   ABG SOURCE  Radial, Right     Results from last 7 days   Lab Units 08/28/20  0835   PROCALCITONIN ng/ml 11 93*       Micro:  Results from last 7 days   Lab Units 08/28/20  0930 08/28/20  0836   BLOOD CULTURE  Received in Microbiology Lab  Culture in Progress  Received in Microbiology Lab  Culture in Progress  EKG:   Imaging: I have personally reviewed pertinent reports  Historical Information   No past medical history on file  No past surgical history on file    Social History   Social History     Substance and Sexual Activity   Alcohol Use Not on file     Social History     Substance and Sexual Activity   Drug Use Not on file     Social History     Tobacco Use   Smoking Status Not on file     Exercise History: independent   Family History:   No family history on file  Family history unknown      Medications:  Current Facility-Administered Medications   Medication Dose Route Frequency    acetaminophen (TYLENOL) tablet 650 mg  650 mg Oral Q6H PRN    chlorhexidine (PERIDEX) 0 12 % oral rinse 15 mL  15 mL Swish & Spit Q12H [address]    levETIRAcetam (KEPPRA) 500 mg in sodium chloride 0 9 % 100 mL IVPB  500 mg Intravenous Q12H [address]    multi-electrolyte (PLASMALYTE-A/ISOLYTE-S PH 7 4) IV solution  125 mL/hr Intravenous Continuous    ondansetron (ZOFRAN) 8 mg in sodium chloride 0 9 % 50 mL IVPB  8 mg Intravenous Q8H PRN     Home medications:  None     Allergies:  No Known Allergies  ------------------------------------------------------------------------------------------------------------  Advance Directive and Living Will:      Power of :    POLST:    ------------------------------------------------------------------------------------------------------------  Anticipated Length of Stay is > 2 midnights    Care Time Delivered:   Upon my evaluation, this patient had a high probability of imminent or life-threatening deterioration due to encephalopathy, TBI , which required my direct attention, intervention, and personal management  I have personally provided 25 minutes (4 to 4:25) of critical care time, exclusive of procedures, teaching, family meetings, and any prior time recorded by providers other than myself  GILBERT Queen        Portions of the record may have been created with voice recognition software  Occasional wrong word or "sound a like" substitutions may have occurred due to the inherent limitations of voice recognition software    Read the chart carefully and recognize, using context, where substitutions have occurred

## 2020-08-28 NOTE — TRAUMA DOCUMENTATION
Noted nonreactive R pupil on initial neuro exam - per report from previous facility pupils were equal, reactive  Rob Hodge RN to trauma bay for team member mikel  This RN remained at bedside

## 2020-08-28 NOTE — ASSESSMENT & PLAN NOTE
Bilateral subdural hematoma  · Presumed unwitnessed fall  · Found down in front yard discharge hold with diffuse abrasions  · Transferred from [address]:  · CT head without 8/28/20 0900:  Bilateral convexity increased subdural collection  Trace left parietal subarachnoid blood  Small amount of layering intraventricular hemorrhage of bilateral occipital horns  · CT head without 8/28/20 1330 (repeat for nonreactive pupil):  Small amount of acute intracranial hemorrhage fully increased amount of intraventricular blood compared to study earlier this morning  Left subarachnoid hemorrhage increased as well as increased density in subdural collection  Small amount hemorrhage anterior to brain stem  Plan:  · Continue monitor neurological exam   Currently GCS12, E3, V4, M5  Will open eyes to voice  The since head since his but does not answer questions  Does not follow commands but can mimic  Moving all extremities without focal weakness  · Frequent neuro checks  · STAT CT head with any neurological decline  · Hold all anti-platelet and anticoagulation medications  Patient's home medications unknown at this time  INR normal at 1 2  · Reverse as needed if informed patient was on any anti-platelet anticoagulation agents  · Recommend CTA to rule out any vascular etiology of progressive hemorrhage  · Blood pressure control with recommended systolic blood pressure less than 160  · Consider Keppra for seizure prophylaxis  · Mobilize with physical occupational therapy when appropriate  · DVT prophylaxis:  Bilateral SCDs only at this time  Hold pharmacological DVT prophylaxis until stable CT head obtained  · Will continue to follow closely  Please call if any questions or concerns

## 2020-08-28 NOTE — ED NOTES
Spoke with Danae Stark - cousin of patient; updated on plan of care      Courtney Gitelman, RN  08/28/20 1547

## 2020-08-28 NOTE — ASSESSMENT & PLAN NOTE
- Suspected stage I pressure injury of the back  - Inpatient wound care consult for assistance with local wound care to multiple wound sites

## 2020-08-28 NOTE — ASSESSMENT & PLAN NOTE
- Acute kidney injury, present on admission   - Continue IV fluid hydration   - Monitor urine output with urinary catheter in place for accurate I/Os  - Monitor renal function and electrolytes  - Avoid nephrotoxic medications and hypotension

## 2020-08-28 NOTE — ASSESSMENT & PLAN NOTE
- Suspected unwitnessed fall with unknown loss of consciousness and the below noted injuries  - Fall precautions  - Geriatric Medicine consultation secondary to baseline dementia, acute encephalopathy, and ISAR >2   - PT and OT evaluation and treatment as indicated when appropriate  - Case Management consultation for disposition planning/expected post acute care facility need

## 2020-08-28 NOTE — ASSESSMENT & PLAN NOTE
- Chronic/baseline history of dementia  - Delirium precautions  - Geriatric Medicine consultation for evaluation and recommendations

## 2020-08-28 NOTE — ASSESSMENT & PLAN NOTE
- Traumatic rhabdomyolysis, present on admission, with associated NAVID  Suspect patient suffered a fall and/or multiple falls with possible downtime of unknown duration based on multiple wounds, some appearing to be pressure related, during admission evaluation   - Continue IV fluid hydration   - Monitor urine output with urinary catheter in place for accurate I/Os  - Monitor renal function and CK level  - Frequent repositioning, every 2 hours

## 2020-08-28 NOTE — WOUND OSTOMY CARE
Consult Note - Wound   Malachi Record  66 y o  male MRN: [id number]  Unit/Bed#: ED 21 Encounter: [id number]      History and Present Illness:  Patient is a 66year old male transferred from [address] [facility] where he presented after being found down by neighbor  Patient was evaluated and transferred to [facility] with traumatic brain injury and multiple present on admission pressure injuries/Wounds  He is in bed, somnolent and confuse, he is not able to elaborate on anything  BMI: There is no height or weight on file to calculate BMI  History  No past medical history on file  No past surgical history on file      Problem List:   Patient Active Problem List   Diagnosis    Dementia ([facility]    Encephalopathy acute    TBI (traumatic brain injury) ([facility]    Traumatic rhabdomyolysis ([facility]    Abrasions of multiple sites    NAVID (acute kidney injury) ([facility]    Fall    Pressure injury of back, stage 1    Type 2 diabetes mellitus, without long-term current use of insulin ([facility] )    Subarachnoid hemorrhage (HCC)    Subdural hematoma (HCC)    IVH (intraventricular hemorrhage) (HCC)       Medications:  Current Facility-Administered Medications   Medication Dose Route Frequency Provider Last Rate Last Dose    acetaminophen (TYLENOL) tablet 650 mg  650 mg Oral Q6H PRN GILBERT Rogers        cefepime (MAXIPIME) 1,000 mg in dextrose 5 % 50 mL IVPB  1,000 mg Intravenous Q12H GILBERT Rogers        chlorhexidine (PERIDEX) 0 12 % oral rinse 15 mL  15 mL Swish & Spit Q12H [address] Laura Woods PA-C        levETIRAcetam (KEPPRA) 500 mg in sodium chloride 0 9 % 100 mL IVPB  500 mg Intravenous Q12H [address] Padmini GILBERT Hardy        metroNIDAZOLE (FLAGYL) IVPB (premix) 500 mg 100 mL  500 mg Intravenous Q8H GILBERT Rogers        multi-electrolyte (ISOLYTE-S PH 7 4) bolus 1,000 mL  1,000 mL Intravenous Once Padmini GILBERT Hardy        multi-electrolyte (PLASMALYTE-A/ISOLYTE-S PH 7 4) IV solution  125 mL/hr Intravenous Continuous GILBERT Smith 125 mL/hr at 08/28/20 1657 125 mL/hr at 08/28/20 1657    ondansetron (ZOFRAN) 8 mg in sodium chloride 0 9 % 50 mL IVPB  8 mg Intravenous Q8H PRN GILBERT Rogers         No current outpatient medications on file  Assessment Findings:   1-Bilateral knee with ESHA pressure injury mixed severe skin abrasion  2-Bilateral hips with POA pressure injury with sever skin abrasion  3-Bilateral arm from elbow below with POA pressure injury mixed with severe skin abrasion  4-Bilateral shoulder with POA pressure injuries with severe surrounding skin abrasion  0-Naveg-hivedke areas with scattered abraded skin with slow blanching erythema  6-Entire back with mixed stage 2 pressure injury and severe skin abrasion  Some scabbing facial abrasion noted, bilateral foot with scattered bruises and blanching erythema  Patient was found down this morning, last seen in normal state was 8/27/2020 at 1800 by personal care giver, patient was found out in his from lawn rolling around the ground in his underwear  Patient has baseline dementia, is not able to elaborate on what led to his demised, and is not able to engage in coherent conversation  Due to unknown period of time patient was down, likely lopez of additional pressure injuries, especially DTI may still be in the process of developing, DTI post injuries can take up to three days before they fully reveal          Skin care plans:  1-Hydraguard to sacrum, buttock, and feet  BID and PRN  2-Allevyn foam to heels, haley w/P, peel foam check skin integrity q-shift  Change q5d  3-Elevate heels to offload pressure  4-Ehob cushion when out of bed  5-Turn/repoisiton q2h or when medically stable for pressure re-distribution on skin  6-Moisturize skin daily with skin nourishing cream  7-Hydraguard to entire back wound TID and PRN    8-Cleanse bilateral knees, arms with NSS, cover wounds with Xeroform, ABD and vannessa, change every other day  9-Ceanse R/L posterior shoulders with NSS, cover wound with xeroform, cover with large allevyn foam, change every other day  10-Cleanse bilateral hip wounds with NSS, cover wounds with xeroform, cover with large Allevyn foam, change every other day  Vitals: Blood pressure 136/67, pulse 94, temperature 99 °F (37 2 °C), resp  rate 22, SpO2 96 %  ,There is no height or weight on file to calculate BMI  Wound 08/28/20 Knee Anterior; Left (Active)   Wound Image   08/28/20 1450   Wound Description Beefy red 08/28/20 1450   Pressure Injury Stage DTPI 08/28/20 1450   Radha-wound Assessment Intact 08/28/20 1450   Wound Length (cm) 18 cm 08/28/20 1450   Wound Width (cm) 22 cm 08/28/20 1450   Wound Surface Area (cm^2) 396 cm^2 08/28/20 1450   Drainage Amount Small 08/28/20 1450   Drainage Description Serous 08/28/20 1450   Patient Tolerance Tolerated well 08/28/20 1454       Wound 08/28/20 Knee Anterior;Right (Active)   Wound Image   08/28/20 1451   Wound Description Beefy red 08/28/20 1451   Pressure Injury Stage DTPI 08/28/20 1451   Radha-wound Assessment Intact 08/28/20 1451   Wound Length (cm) 22 cm 08/28/20 1451   Wound Width (cm) 18 cm 08/28/20 1451   Wound Depth (cm) 0 1 cm 08/28/20 1451   Wound Surface Area (cm^2) 396 cm^2 08/28/20 1451   Wound Volume (cm^3) 39 6 cm^3 08/28/20 1451   Calculated Wound Volume (cm^3) 39 6 cm^3 08/28/20 1451   Drainage Amount Small 08/28/20 1451   Drainage Description Serous 08/28/20 1451   Patient Tolerance Tolerated well 08/28/20 1451       Wound 08/28/20 Hip Anterior; Left (Active)   Wound Image   08/28/20 1454   Wound Description Beefy red 08/28/20 1454   Pressure Injury Stage DTPI 08/28/20 1454   Radha-wound Assessment Intact 08/28/20 1454   Wound Length (cm) 18 cm 08/28/20 1454   Wound Width (cm) 22 cm 08/28/20 1454   Wound Surface Area (cm^2) 396 cm^2 08/28/20 1454   Drainage Amount Scant 08/28/20 1454   Drainage Description Serous 08/28/20 1454 Patient Tolerance Tolerated well 08/28/20 1454       Wound 08/28/20 Hip Anterior;Right (Active)   Wound Image   08/28/20 1454   Wound Description Beefy red 08/28/20 1454   Pressure Injury Stage DTPI 08/28/20 1454   Wound Length (cm) 14 cm 08/28/20 1500   Wound Width (cm) 24 cm 08/28/20 1500   Wound Surface Area (cm^2) 336 cm^2 08/28/20 1500   Drainage Amount Scant 08/28/20 1500   Drainage Description Serous 08/28/20 1500   Patient Tolerance Tolerated well 08/28/20 1500       Wound 08/28/20 Arm Lower; Posterior;Proximal;Right (Active)   Wound Image   08/28/20 1455   Wound Description Beefy red;Black 08/28/20 1455   Pressure Injury Stage DTPI 08/28/20 1455   Radha-wound Assessment Intact 08/28/20 1455   Wound Length (cm) 28 cm 08/28/20 1456   Wound Width (cm) 16 cm 08/28/20 1456   Wound Surface Area (cm^2) 448 cm^2 08/28/20 1456   Drainage Amount Scant 08/28/20 1456   Drainage Description Serous 08/28/20 1456   Patient Tolerance Tolerated well 08/28/20 1456       Wound 08/28/20 Shoulder Posterior;Right (Active)   Wound Image   08/28/20 1456   Wound Description Beefy red 08/28/20 1456   Radha-wound Assessment Intact 08/28/20 1456   Wound Length (cm) 7 cm 08/28/20 1456   Wound Width (cm) 8 cm 08/28/20 1456   Wound Surface Area (cm^2) 56 cm^2 08/28/20 1456   Drainage Amount Scant 08/28/20 1456   Drainage Description Serous 08/28/20 1456   Patient Tolerance Tolerated well 08/28/20 1456       Wound 08/28/20 Arm Left; Lower; Posterior;Proximal (Active)   Wound Image   08/28/20 1457   Wound Description Beefy red 08/28/20 1457   Pressure Injury Stage DTPI 08/28/20 1457   Radha-wound Assessment Intact 08/28/20 1457   Wound Length (cm) 14 cm 08/28/20 1457   Wound Width (cm) 9 cm 08/28/20 1457   Wound Surface Area (cm^2) 126 cm^2 08/28/20 1457   Drainage Amount Scant 08/28/20 1457   Drainage Description Serous 08/28/20 1457   Patient Tolerance Tolerated well 08/28/20 1457       Wound 08/28/20 Shoulder Left;Posterior (Active)   Wound Image   08/28/20 1458   Wound Description Beefy red 08/28/20 1458   Pressure Injury Stage DTPI 08/28/20 1458   Radha-wound Assessment Intact 08/28/20 1458   Wound Length (cm) 4 cm 08/28/20 1458   Wound Width (cm) 6 cm 08/28/20 1458   Wound Surface Area (cm^2) 24 cm^2 08/28/20 1458   Drainage Amount Scant 08/28/20 1458   Drainage Description Serous 08/28/20 1458   Patient Tolerance Tolerated well 08/28/20 1458       Wound 08/28/20 Back Left;Upper (Active)   Wound Image    08/28/20 1500   Wound Description Beefy red 08/28/20 1500   Pressure Injury Stage 2 08/28/20 1500   Radha-wound Assessment Intact 08/28/20 1500   Wound Length (cm) 26 cm 08/28/20 1500   Wound Width (cm) 24 cm 08/28/20 1500   Wound Depth (cm) 0 1 cm 08/28/20 1500   Wound Surface Area (cm^2) 624 cm^2 08/28/20 1500   Wound Volume (cm^3) 62 4 cm^3 08/28/20 1500   Calculated Wound Volume (cm^3) 62 4 cm^3 08/28/20 1500   Drainage Amount Scant 08/28/20 1500   Drainage Description Serous 08/28/20 1500   Patient Tolerance Tolerated well 08/28/20 1500           Our skin care recommendations are placed as nursing orders, wound care to continue following, please call ext [phone] or [phone] with question or concerns        Ten Guillen RN, BNS, [employer]

## 2020-08-28 NOTE — CONSULTS
Consult- Catherine Contreras  1945, 66 y o  male MRN: [id number]    Unit/Bed#: ED 21 Encounter: [id number]    Primary Care Provider: Tristen Paulino MD   Date and time admitted to hospital: 8/28/2020 12:43 PM      Consults     Images reviewed with attending inpatient seen examined approximately 8/28/20 1345  Subdural hematoma (HCC)  Assessment & Plan  Bilateral subdural hematoma  · Presumed unwitnessed fall  · Found down in front yard discharge hold with diffuse abrasions  · Transferred from [address] [place] [address] Drive:  · CT head without 8/28/20 0900:  Bilateral convexity increased subdural collection  Trace left parietal subarachnoid blood  Small amount of layering intraventricular hemorrhage of bilateral occipital horns  · CT head without 8/28/20 1330 (repeat for nonreactive pupil):  Small amount of acute intracranial hemorrhage fully increased amount of intraventricular blood compared to study earlier this morning  Left subarachnoid hemorrhage increased as well as increased density in subdural collection  Small amount hemorrhage anterior to brain stem  Plan:  · Continue monitor neurological exam   Currently GCS12, E3, V4, M5  Will open eyes to voice  The since head since his but does not answer questions  Does not follow commands but can mimic  Moving all extremities without focal weakness  · Frequent neuro checks  · STAT CT head with any neurological decline  · Hold all anti-platelet and anticoagulation medications  Patient's home medications unknown at this time  INR normal at 1 2  · Reverse as needed if informed patient was on any anti-platelet anticoagulation agents  · Recommend CTA to rule out any vascular etiology of progressive hemorrhage  · Blood pressure control with recommended systolic blood pressure less than 160    · Consider Keppra for seizure prophylaxis  · Mobilize with physical occupational therapy when appropriate  · DVT prophylaxis:  Bilateral SCDs only at this time   Hold pharmacological DVT prophylaxis until stable CT head obtained  · Will continue to follow closely  Please call if any questions or concerns  IVH (intraventricular hemorrhage) ([facility] [address] )  Assessment & Plan  See plan as above    Subarachnoid hemorrhage (HCC)  Assessment & Plan  S/p fall  - recommend CTA     See plan as above    * TBI (traumatic brain injury) [facility])  Assessment & Plan  See plan as above  coag eval    History of Present Illness     HPI: Annita Christianson  is a 66 y o  male with unknown PMH who presents with as a transfer ER after being found disheveled in his front yard  Patient was found to have diffuse abrasions  CT head demonstrated intraventricular hemorrhage along small amount of subarachnoid hemorrhage subdural collection  Labs are consistent with rhabdomyolysis vision given IV fluids  His recent patient was transferred to [facility] for ongoing care  No records available for past medical history nor home medications  Patient's INR is normal   No contact information available  Patient unable to provide history  Review of Systems   Unable to perform ROS: Mental status change       Historical Information   No past medical history on file  No past surgical history on file  Social History     Substance and Sexual Activity   Alcohol Use Not on file     Social History     Substance and Sexual Activity   Drug Use Not on file     Social History     Tobacco Use   Smoking Status Not on file     No family history on file  Meds/Allergies   all current active meds have been reviewed, current meds:   Current Facility-Administered Medications   Medication Dose Route Frequency    chlorhexidine (PERIDEX) 0 12 % oral rinse 15 mL  15 mL Swish & Spit Q12H [address]    and PTA meds:   None     No Known Allergies    Objective   I/O     None          Physical Exam  Constitutional:       General: He is not in acute distress  Appearance: He is well-developed   He is not ill-appearing  HENT:      Head: Normocephalic  Right Ear: External ear normal       Left Ear: External ear normal       Nose: Nose normal       Mouth/Throat:      Mouth: Mucous membranes are moist    Eyes:      General: No scleral icterus  Right eye: No discharge  Left eye: No discharge  Conjunctiva/sclera: Conjunctivae normal    Neck:      Musculoskeletal: Normal range of motion and neck supple  Cardiovascular:      Rate and Rhythm: Normal rate  Pulmonary:      Effort: Pulmonary effort is normal  No respiratory distress  Abdominal:      General: There is no distension  Palpations: Abdomen is soft  Tenderness: There is no abdominal tenderness  Musculoskeletal:         General: No tenderness  Skin:     General: Skin is warm and dry  Comments: Diffuse abrasions BLE, RUE, back  Scrotal edema and erythema   Neurological:      Deep Tendon Reflexes:      Reflex Scores:       Patellar reflexes are 1+ on the right side and 1+ on the left side  Psychiatric:         Speech: Speech is slurred  Behavior: Behavior is slowed  Judgment: Judgment is inappropriate  Neurologic Exam     Mental Status   Follows commands: Will mimic but not consistently following commands  Attention: decreased  Concentration: decreased  Speech: slurred   Level of consciousness: alert  Knowledge: poor  Unable to name object  Abnormal comprehension  Does not answer questions appropriately  Repetitively says 'I am fine '     Cranial Nerves     CN III, IV, VI   Right pupil: Size: 4 mm  Shape: regular  Reactivity: non-reactive  Consensual response: intact  Left pupil: Size: 4 mm  Shape: regular  Reactivity: brisk  Conjugate gaze: absent    CN V   Facial sensation intact  CN VII   Facial expression full, symmetric       CN VIII   Hearing: impaired    CN XII   Tongue: not atrophic  Fasciculations: absent    Motor Exam   Muscle bulk: normal  Overall muscle tone: normalGrossly moving all extremities without focal weakness  Unable to test drift  Sensory Exam   Appears grossly intact to crude touch x4     Gait, Coordination, and Reflexes     Tremor   Resting tremor: absent  Intention tremor: absent  Action tremor: absent    Reflexes   Right patellar: 1+  Left patellar: 1+  Right ankle clonus: absent  Left ankle clonus: absent        Vitals:Blood pressure 115/65, pulse 92, temperature 99 °F (37 2 °C), resp  rate 22, SpO2 97 %  ,There is no height or weight on file to calculate BMI  Lab Results:   Results from last 7 days   Lab Units 08/28/20  0834   WBC Thousand/uL 23 10*   HEMOGLOBIN g/dL 13 0*   HEMATOCRIT % 38 8*   PLATELETS Thousands/uL 280   MONO PCT % 3*     Results from last 7 days   Lab Units 08/28/20  0836   POTASSIUM mmol/L 3 9   CHLORIDE mmol/L 106   CO2 mmol/L 22   BUN mg/dL 41*   CREATININE mg/dL 1 16   CALCIUM mg/dL 8 9   ALK PHOS U/L 84   ALT U/L 47   AST U/L 130*             Results from last 7 days   Lab Units 08/28/20  0835   INR  1 21*   PTT seconds 30     No results found for: TROPONINT  ABG:  Lab Results   Component Value Date    PHART 7 270 (L) 08/28/2020    TAR4VGU 41 9 08/28/2020    PO2ART 138 0 (H) 08/28/2020    NEA6KIV 18 6 (L) 08/28/2020    BEART -7 6 (L) 08/28/2020    SOURCE Radial, Right 08/28/2020       Imaging Studies: I have personally reviewed pertinent reports  and I have personally reviewed pertinent films in PACS    Xr Knee 4+ Vw Left Injury    Result Date: 8/28/2020  Impression: Healed fracture deformities proximal tibia and fibula No acute osseous abnormality  Workstation performed: [id number]     Xr Knee 4+ Vw Right Injury    Result Date: 8/28/2020  Impression: No acute osseous abnormality   Workstation performed: [id number]     Ct Head Wo Contrast    Result Date: 8/28/2020  Impression: Small amount of acute intracranial hemorrhage, slightly increased from the recent prior study with larger amount of intraventricular blood and left subarachnoid hemorrhage noted as well as bilateral small low-density subdural collections  No critical mass effect or acute infarction or parenchymal hemorrhage identified  The study was marked in [facility] for immediate notification  Workstation performed: [id number]     Trauma - Ct Head Wo Contrast    Result Date: 8/28/2020  Impression: Small amount of layering intraventricular hemorrhage occipital horns bilaterally  Question trace gyriform or subarachnoid blood left parietal region series 2/26  I personally discussed this study with NIRU MACKAY on 8/28/2020 at 9:49 AM  Workstation performed: [id number]     Trauma - Ct Spine Cervical Wo Contrast    Result Date: 8/28/2020  Impression: No cervical spine fracture or traumatic malalignment  Workstation performed: [id number]     Ct Spine Lumbar Without Contrast    Result Date: 8/28/2020  Impression: Mild multilevel degenerative spondylosis No acute traumatic injury identified Small right-sided disc protrusion L4-5 Nonobstructive upper pole left renal calculus Workstation performed: [id number]     Trauma - Ct Chest Abdomen Pelvis W Contrast    Result Date: 8/28/2020  Impression: 1  No acute traumatic injury identified within the chest  2   No intra-abdominal solid organ injury  3   Ill-defined hyperattenuating foci in the small bowel mesentery raises possibility of small mesenteric hematomas  Other considerations include adenopathy or carcinoid tumors although felt somewhat less likely  Attention on follow-up is recommended and  surgical consultation is advised  No free air or evidence of diffuse hemoperitoneum  4  Indeterminate left renal hypodensities  Nonemergent ultrasound correlation recommended  5  Question age-indeterminate nondisplaced fracture of the right anterior fifth and sixth ribs  6  Prostatomegaly  7  Left inguinal hernia containing fat and a portion of sigmoid colon    I personally discussed this study with NIRU MACKAY on 8/28/2020 at 9:49 AM  Workstation performed: [id number]       EKG, Pathology, and Other Studies: I have personally reviewed pertinent reports  VTE Prophylaxis: Sequential compression device (Venodyne)  and Reason for no pharmacologic prophylaxis SDH/SAH/IVH    Code Status: Level 1 - Full Code  Advance Directive and Living Will:      Power of :    POLST:      Counseling / Coordination of Care  I spent 30 minutes with the patient

## 2020-08-28 NOTE — EMTALA/ACUTE CARE TRANSFER
[facility]  EMERGENCY DEPARTMENT  [address] [address]  [phone]  Dept: [phone]      [address] TRANSFER CONSENT    NAME Jose ALLEN 1945                              MRN [id number]    I have been informed of my rights regarding examination, treatment, and transfer   by Dr Twila Nunes MD    Benefits:      Risks:        Consent for Transfer:  I acknowledge that my medical condition has been evaluated and explained to me by the emergency department physician or other qualified medical person and/or my attending physician, who has recommended that I be transferred to the service of    at    The above potential benefits of such transfer, the potential risks associated with such transfer, and the probable risks of not being transferred have been explained to me, and I fully understand them  The doctor has explained that, in my case, the benefits of transfer outweigh the risks  I agree to be transferred  I authorize the performance of emergency medical procedures and treatments upon me in both transit and upon arrival at the receiving facility  Additionally, I authorize the release of any and all medical records to the receiving facility and request they be transported with me, if possible  I understand that the safest mode of transportation during a medical emergency is an ambulance and that the Hospital advocates the use of this mode of transport  Risks of traveling to the receiving facility by car, including absence of medical control, life sustaining equipment, such as oxygen, and medical personnel has been explained to me and I fully understand them  (TYLER CORRECT BOX BELOW)  [  ]  I consent to the stated transfer and to be transported by ambulance/helicopter  [  ]  I consent to the stated transfer, but refuse transportation by ambulance and accept full responsibility for my transportation by car    I understand the risks of non-ambulance transfers and I exonerate the Hospital and its staff from any deterioration in my condition that results from this refusal     X___________________________________________    DATE  20  TIME________  Signature of patient or legally responsible individual signing on patient behalf           RELATIONSHIP TO PATIENT_________________________          Provider Certification    NAME Annita Christianson   1945                              MRN [id number]    A medical screening exam was performed on the above named patient  Based on the examination:    Condition Necessitating Transfer The primary encounter diagnosis was Intraventricular hemorrhage ([facility] [address] )  Diagnoses of Subarachnoid bleed ([facility] [address] ), Rhabdomyolysis, Elevated troponin, Metabolic acidosis, and Leukocytosis were also pertinent to this visit  Patient Condition:      Reason for Transfer:      Transfer Requirements: Facility     · Space available and qualified personnel available for treatment as acknowledged by    · Agreed to accept transfer and to provide appropriate medical treatment as acknowledged by          · Appropriate medical records of the examination and treatment of the patient are provided at the time of transfer   [facility], [address] _______  · Transfer will be performed by qualified personnel from    and appropriate transfer equipment as required, including the use of necessary and appropriate life support measures      Provider Certification: I have examined the patient and explained the following risks and benefits of being transferred/refusing transfer to the patient/family:         Based on these reasonable risks and benefits to the patient and/or the unborn child(jose), and based upon the information available at the time of the patients examination, I certify that the medical benefits reasonably to be expected from the provision of appropriate medical treatments at another medical facility outweigh the increasing risks, if any, to the individuals medical condition, and in the case of labor to the unborn child, from effecting the transfer      X____________________________________________ DATE 08/28/20        TIME_______      ORIGINAL - SEND TO MEDICAL RECORDS   COPY - SEND WITH PATIENT DURING TRANSFER

## 2020-08-28 NOTE — TRAUMA DOCUMENTATION
HARPER Huffman to bedside to assess pt - to enter CT head order  Will transport pt as soon as possible

## 2020-08-28 NOTE — TRAUMA DOCUMENTATION
Pt returned to room from CT, placed back on full monitor  Will continue trauma VS measurements/monitoring  No change to neuro assessment upon return  R pupil remains fixed at 4 mm  Trauma team HARPER Huffman aware

## 2020-08-28 NOTE — ASSESSMENT & PLAN NOTE
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 as well as suspected trace subarachnoid hemorrhage in the left parietal region on initial CT scan  - On repeat CT scan of head secondary to concerning pupillary exam change following transferred to [facility], there appears to be slight increase of the bilateral intraventricular hemorrhage, blossoming of the left parietal subarachnoid hemorrhage, bilateral subdural hematomas and suspected bleeding near the brainstem with formal/final radiologic read pending   - Neurosurgery consultation and recommendations pending   - Admit to ICU for frequent neurologic exams in light of worsening CT scan of the head  - Neurologic exams every 1 hour   - Initiate Keppra 500 mg every 12 hours for seizure prophylaxis  - Avoid all anti-platelet and anticoagulant medications  Only currently available medication list is from February of 2019 from the patient's primary care provider office with no listed anti-platelet and anticoagulant medications, and the patient's coag panel is within normal limits currently  - Repeat CT scan of the head on 08/29/2020 or sooner if change in neurologic status and or decline in GCS >2   - PT and OT evaluation and treatment as indicated when appropriate  - Plan to keep patient NPO pending neurosurgery evaluation  If cleared for diet with no intervention plan by Neurosurgery, patient should have a dysphagia/swallow evaluation prior to initiation of oral diet in setting of traumatic brain injury and encephalopathy

## 2020-08-29 ENCOUNTER — APPOINTMENT (INPATIENT)
Dept: RADIOLOGY | Facility: HOSPITAL | Age: 75
DRG: 963 | End: 2020-08-29
Payer: MEDICARE

## 2020-08-29 LAB
ALBUMIN SERPL BCP-MCNC: 2.9 G/DL (ref 3.5–5)
ALP SERPL-CCNC: 83 U/L (ref 46–116)
ALT SERPL W P-5'-P-CCNC: 108 U/L (ref 12–78)
ANION GAP SERPL CALCULATED.3IONS-SCNC: 10 MMOL/L (ref 4–13)
AST SERPL W P-5'-P-CCNC: 390 U/L (ref 5–45)
ATRIAL RATE: 88 BPM
BACTERIA UR CULT: NORMAL
BASOPHILS # BLD AUTO: 0.03 THOUSANDS/ΜL (ref 0–0.1)
BASOPHILS NFR BLD AUTO: 0 % (ref 0–1)
BILIRUB SERPL-MCNC: 4.07 MG/DL (ref 0.2–1)
BUN SERPL-MCNC: 31 MG/DL (ref 5–25)
CALCIUM SERPL-MCNC: 8.2 MG/DL (ref 8.3–10.1)
CHLORIDE SERPL-SCNC: 115 MMOL/L (ref 100–108)
CK MB SERPL-MCNC: 36.8 NG/ML (ref 0–5)
CK MB SERPL-MCNC: 52.2 NG/ML (ref 0–5)
CK MB SERPL-MCNC: <1 % (ref 0–2.5)
CK MB SERPL-MCNC: <1 % (ref 0–2.5)
CK SERPL-CCNC: ABNORMAL U/L (ref 39–308)
CO2 SERPL-SCNC: 22 MMOL/L (ref 21–32)
CREAT SERPL-MCNC: 0.68 MG/DL (ref 0.6–1.3)
EOSINOPHIL # BLD AUTO: 0.01 THOUSAND/ΜL (ref 0–0.61)
EOSINOPHIL NFR BLD AUTO: 0 % (ref 0–6)
ERYTHROCYTE [DISTWIDTH] IN BLOOD BY AUTOMATED COUNT: 14.2 % (ref 11.6–15.1)
EST. AVERAGE GLUCOSE BLD GHB EST-MCNC: 117 MG/DL
GFR SERPL CREATININE-BSD FRML MDRD: 92 ML/MIN/1.73SQ M
GLUCOSE SERPL-MCNC: 83 MG/DL (ref 65–140)
HBA1C MFR BLD: 5.7 %
HCT VFR BLD AUTO: 31 % (ref 36.5–49.3)
HCT VFR BLD AUTO: 31.6 % (ref 36.5–49.3)
HCT VFR BLD AUTO: 35.3 % (ref 36.5–49.3)
HGB BLD-MCNC: 10.2 G/DL (ref 12–17)
HGB BLD-MCNC: 10.6 G/DL (ref 12–17)
HGB BLD-MCNC: 11.9 G/DL (ref 12–17)
IMM GRANULOCYTES # BLD AUTO: 0.07 THOUSAND/UL (ref 0–0.2)
IMM GRANULOCYTES NFR BLD AUTO: 0 % (ref 0–2)
LYMPHOCYTES # BLD AUTO: 1.13 THOUSANDS/ΜL (ref 0.6–4.47)
LYMPHOCYTES NFR BLD AUTO: 7 % (ref 14–44)
MAGNESIUM SERPL-MCNC: 2.7 MG/DL (ref 1.6–2.6)
MCH RBC QN AUTO: 31.2 PG (ref 26.8–34.3)
MCHC RBC AUTO-ENTMCNC: 33.7 G/DL (ref 31.4–37.4)
MCV RBC AUTO: 93 FL (ref 82–98)
MONOCYTES # BLD AUTO: 1.57 THOUSAND/ΜL (ref 0.17–1.22)
MONOCYTES NFR BLD AUTO: 9 % (ref 4–12)
NEUTROPHILS # BLD AUTO: 14.59 THOUSANDS/ΜL (ref 1.85–7.62)
NEUTS SEG NFR BLD AUTO: 84 % (ref 43–75)
NRBC BLD AUTO-RTO: 0 /100 WBCS
P AXIS: 75 DEGREES
PHOSPHATE SERPL-MCNC: 2.9 MG/DL (ref 2.3–4.1)
PLATELET # BLD AUTO: 250 THOUSANDS/UL (ref 149–390)
PMV BLD AUTO: 9.7 FL (ref 8.9–12.7)
POTASSIUM SERPL-SCNC: 4.1 MMOL/L (ref 3.5–5.3)
PR INTERVAL: 132 MS
PROT SERPL-MCNC: 6.1 G/DL (ref 6.4–8.2)
QRS AXIS: 57 DEGREES
QRSD INTERVAL: 80 MS
QT INTERVAL: 406 MS
QTC INTERVAL: 491 MS
RBC # BLD AUTO: 3.81 MILLION/UL (ref 3.88–5.62)
SODIUM SERPL-SCNC: 147 MMOL/L (ref 136–145)
T WAVE AXIS: 59 DEGREES
TROPONIN I SERPL-MCNC: 0.11 NG/ML
TROPONIN I SERPL-MCNC: 0.13 NG/ML
VENTRICULAR RATE: 88 BPM
WBC # BLD AUTO: 17.4 THOUSAND/UL (ref 4.31–10.16)

## 2020-08-29 PROCEDURE — 85018 HEMOGLOBIN: CPT | Performed by: PHYSICIAN ASSISTANT

## 2020-08-29 PROCEDURE — 92610 EVALUATE SWALLOWING FUNCTION: CPT

## 2020-08-29 PROCEDURE — 85014 HEMATOCRIT: CPT | Performed by: PHYSICIAN ASSISTANT

## 2020-08-29 PROCEDURE — 80053 COMPREHEN METABOLIC PANEL: CPT | Performed by: PHYSICIAN ASSISTANT

## 2020-08-29 PROCEDURE — 83735 ASSAY OF MAGNESIUM: CPT | Performed by: PHYSICIAN ASSISTANT

## 2020-08-29 PROCEDURE — 84100 ASSAY OF PHOSPHORUS: CPT | Performed by: PHYSICIAN ASSISTANT

## 2020-08-29 PROCEDURE — 82553 CREATINE MB FRACTION: CPT | Performed by: NURSE PRACTITIONER

## 2020-08-29 PROCEDURE — 93010 ELECTROCARDIOGRAM REPORT: CPT | Performed by: INTERNAL MEDICINE

## 2020-08-29 PROCEDURE — 84484 ASSAY OF TROPONIN QUANT: CPT | Performed by: NURSE PRACTITIONER

## 2020-08-29 PROCEDURE — 82550 ASSAY OF CK (CPK): CPT | Performed by: NURSE PRACTITIONER

## 2020-08-29 PROCEDURE — 83036 HEMOGLOBIN GLYCOSYLATED A1C: CPT | Performed by: PHYSICIAN ASSISTANT

## 2020-08-29 PROCEDURE — 99233 SBSQ HOSP IP/OBS HIGH 50: CPT | Performed by: SURGERY

## 2020-08-29 PROCEDURE — 82553 CREATINE MB FRACTION: CPT | Performed by: PHYSICIAN ASSISTANT

## 2020-08-29 PROCEDURE — 82550 ASSAY OF CK (CPK): CPT | Performed by: PHYSICIAN ASSISTANT

## 2020-08-29 PROCEDURE — 85025 COMPLETE CBC W/AUTO DIFF WBC: CPT | Performed by: PHYSICIAN ASSISTANT

## 2020-08-29 PROCEDURE — 71045 X-RAY EXAM CHEST 1 VIEW: CPT

## 2020-08-29 PROCEDURE — 99232 SBSQ HOSP IP/OBS MODERATE 35: CPT | Performed by: NEUROLOGICAL SURGERY

## 2020-08-29 PROCEDURE — NC001 PR NO CHARGE: Performed by: PHYSICIAN ASSISTANT

## 2020-08-29 RX ORDER — LEVETIRACETAM 500 MG/1
500 TABLET ORAL EVERY 12 HOURS SCHEDULED
Status: COMPLETED | OUTPATIENT
Start: 2020-08-29 | End: 2020-09-04

## 2020-08-29 RX ORDER — POTASSIUM CHLORIDE 14.9 MG/ML
20 INJECTION INTRAVENOUS
Status: DISPENSED | OUTPATIENT
Start: 2020-08-29 | End: 2020-08-29

## 2020-08-29 RX ORDER — ONDANSETRON 2 MG/ML
4 INJECTION INTRAMUSCULAR; INTRAVENOUS EVERY 6 HOURS PRN
Status: DISCONTINUED | OUTPATIENT
Start: 2020-08-29 | End: 2020-09-15 | Stop reason: HOSPADM

## 2020-08-29 RX ADMIN — SODIUM CHLORIDE, SODIUM GLUCONATE, SODIUM ACETATE, POTASSIUM CHLORIDE AND MAGNESIUM CHLORIDE 125 ML/HR: 526; 502; 368; 37; 30 INJECTION, SOLUTION INTRAVENOUS at 19:04

## 2020-08-29 RX ADMIN — CEFEPIME HYDROCHLORIDE 2000 MG: 2 INJECTION, POWDER, FOR SOLUTION INTRAVENOUS at 18:30

## 2020-08-29 RX ADMIN — POTASSIUM CHLORIDE 20 MEQ: 14.9 INJECTION, SOLUTION INTRAVENOUS at 02:52

## 2020-08-29 RX ADMIN — METRONIDAZOLE 500 MG: 500 INJECTION, SOLUTION INTRAVENOUS at 01:02

## 2020-08-29 RX ADMIN — CEFEPIME HYDROCHLORIDE 2000 MG: 2 INJECTION, POWDER, FOR SOLUTION INTRAVENOUS at 07:48

## 2020-08-29 RX ADMIN — LIDOCAINE 5% 1 PATCH: 700 PATCH TOPICAL at 08:23

## 2020-08-29 RX ADMIN — LEVETIRACETAM 500 MG: 100 INJECTION, SOLUTION INTRAVENOUS at 08:23

## 2020-08-29 RX ADMIN — CHLORHEXIDINE GLUCONATE 0.12% ORAL RINSE 15 ML: 1.2 LIQUID ORAL at 08:23

## 2020-08-29 RX ADMIN — METRONIDAZOLE 500 MG: 500 INJECTION, SOLUTION INTRAVENOUS at 08:23

## 2020-08-29 RX ADMIN — METRONIDAZOLE 500 MG: 500 INJECTION, SOLUTION INTRAVENOUS at 16:14

## 2020-08-29 RX ADMIN — CHLORHEXIDINE GLUCONATE 0.12% ORAL RINSE 15 ML: 1.2 LIQUID ORAL at 21:40

## 2020-08-29 RX ADMIN — SODIUM CHLORIDE, SODIUM GLUCONATE, SODIUM ACETATE, POTASSIUM CHLORIDE AND MAGNESIUM CHLORIDE 125 ML/HR: 526; 502; 368; 37; 30 INJECTION, SOLUTION INTRAVENOUS at 08:12

## 2020-08-29 RX ADMIN — LEVETIRACETAM 500 MG: 500 TABLET, FILM COATED ORAL at 21:40

## 2020-08-29 NOTE — PROGRESS NOTES
Daily Progress Note - Critical Care   Roberto Carlos Evans  66 y o  male MRN: [id number]  Unit/Bed#: ICU 10 Encounter: [id number]        ----------------------------------------------------------------------------------------  HPI/24hr events: Patient admitted yesterday after he was found outside on the ground by visiting nursing  CT imaging was concerning for possible traumat SAH  CTA imaging was negative for aneurysm     Patient has 1/2 blood cultures positive for GPC and GNR  ---------------------------------------------------------------------------------------  SUBJECTIVE  No complaints per patient     Review of Systems  Review of systems was unable to be performed secondary to encephalopathy  ---------------------------------------------------------------------------------------  Assessment and Plan:    Neuro:    Diagnosis: Traumatic SAH  o Plan: CTA negative for vascular etiology   o NS following  o Continue Keppra for seizure prophylaxis   o Holding antiplatelet and AC  o Tylenol prn pain   Diagnosis: Encephalopathy multifactorial secondary to TBI, possible sepsis   o Plan: Continue supportive care  o Continue abx for possible bacteremia   o CAM ICU  o Tylenol prn pain- limit narcotics   o Encourage sleep wake cycle       CV:    Diagnosis: Elevated troponin   o Plan: Trending down   o No evidence of ischemia on EKG   o Will need to check echocardiogram secondary to bacteremia       Pulm:   Diagnosis: No acute issues  o Plan: Encourage IS and pulmonary exercises    GI:    Diagnosis: Mesenteric hematoma on CT imaging   o Plan: Patient may need follow up to r/o possibility of carcinoid tumor   o Abdominal exam benign   o Will plan to advance diet       :    Diagnosis: No acute issues  o Plan: DC temple   o Trend creatine       F/E/N:    Plan:  Will plan to advance diet follow bedside swallow evaluation    Trend electrolytes and replete as necessary   DC IVF once taking PO       Heme/Onc:  Diagnosis: Leukocytosis  o Plan: Trending CBC    Endo:    Diagnosis: No acute issues    ID:    Diagnosis: Sepsis with concern for bacteremia   o Plan: 1/2 blood cultures growing GNR and GPC  o Continue cefepime and flagyl  o May need to add vancomycin- MRSA screen pending   o PCT 11 9 yesterday       MSK/Skin:    Diagnosis: Traumatic rhabdomyolysis   o Plan: Trend CPK Q8 until trending down    o Continue IVF    Diagnosis: Multiple skin wounds   o Plan: Hydraguard to sacrum, buttock, and feet  BID and PRN  o Allevyn foam to heels, haley w/P, peel foam check skin integrity q-shift  o Elevate heels to offload pressure   o Ehob cushion when out of bed    o Turn/repoisiton q2h or when medically stable for pressure re-distribution on skin  o Moisturize skin daily with skin nourishing cream   o Hydraguard to entire back wound TID and PRN  o Cleanse bilateral knees, arms with NSS, cover wounds with Xeroform, ABD and vannessa, change every other day  o Ceanse R/L posterior shoulders with NSS, cover wound with xeroform, cover with large allevyn foam, change every other day  o Cleanse bilateral hip wounds with NSS, cover wounds with xeroform, cover with large Allevyn foam, change every other day  Disposition: Transfer to Med-Surg   Code Status: Level 1 - Full Code  ---------------------------------------------------------------------------------------  ICU CORE MEASURES    Prophylaxis   VTE Pharmacologic Prophylaxis: Pharmacologic VTE Prophylaxis contraindicated due to [address]  Check CT brain in AM, if stable consider starting DVT prophylaxis   VTE Mechanical Prophylaxis: sequential compression device  Stress Ulcer Prophylaxis: Prophylaxis Not Indicated     ABCDE Protocol (if indicated)  Plan to perform spontaneous awakening trial today? Not applicable  Plan to perform spontaneous breathing trial today? Not applicable  Obvious barriers to extubation?  Not applicable  CAM-ICU: Negative    Invasive Devices Review  Invasive Devices     Peripheral Intravenous Line            Peripheral IV 20 Right Antecubital 1 day    Peripheral IV 20 Right Hand 1 day          Drain            Urethral Catheter Temperature probe 16 Fr  1 day              Can any invasive devices be discontinued today? Yes  ---------------------------------------------------------------------------------------  OBJECTIVE    Vitals   Vitals:    20 0800 20 0900 20 1000 20 1100   BP: 123/72 113/60 96/58 92/54   Pulse: 84 84 92 90   Resp: 20 15 (!) 25 (!) 25   Temp:       TempSrc:       SpO2: 94% 94% 96% 96%   Height:         Temp (24hrs), Av 8 °F (37 1 °C), Min:98 2 °F (36 8 °C), Max:99 °F (37 2 °C)  Current: Temperature: 98 6 °F (37 °C)      Respiratory:  SpO2: SpO2: 96 %, SpO2 Activity: SpO2 Activity: At Rest, SpO2 Device: O2 Device: None (Room air)       Invasive/non-invasive ventilation settings   Respiratory    Lab Data (Last 4 hours)    None         O2/Vent Data (Last 4 hours)    None                Physical Exam  Vitals signs reviewed  Constitutional:       Comments: Elderly not well kept man    HENT:      Head: Abrasion present  Head contusion: Abrasions  Mouth/Throat:      Mouth: Mucous membranes are dry  Eyes:      Pupils: Pupils are equal, round, and reactive to light  Neck:      Musculoskeletal: Neck supple  Cardiovascular:      Rate and Rhythm: Normal rate  Pulmonary:      Effort: No respiratory distress  Abdominal:      General: There is no distension  Palpations: Abdomen is soft  Tenderness: There is no abdominal tenderness  Genitourinary:     Comments: Argueta in place  Skin:     General: Skin is warm  Comments: Multiple areas of skin breakdown and abrasions   Neurological:      Comments: Patient following commands   He is very hard of hearing and it takes some time for him to understand  He appears to be moving his RLE less that what I would expect but difficult to fully evaluation   He is sitting in bed awake and alert, feeding himself this morning on exam          Laboratory and Diagnostics:  Results from last 7 days   Lab Units 08/29/20  0618 08/28/20  2338 08/28/20  1649 08/28/20  0834   WBC Thousand/uL 17 40*  --  19 16* 23 10*   HEMOGLOBIN g/dL 11 9* 11 9* 12 4 13 0*   HEMATOCRIT % 35 3* 36 2* 37 6 38 8*   PLATELETS Thousands/uL 250  --  240 280   NEUTROS PCT % 84*  --  85*  --    BANDS PCT %  --   --   --  17*   MONOS PCT % 9  --  9  --    MONO PCT %  --   --   --  3*     Results from last 7 days   Lab Units 08/29/20  0618 08/28/20  1649 08/28/20  0836   SODIUM mmol/L 147* 145 143   POTASSIUM mmol/L 4 1 3 3* 3 9   CHLORIDE mmol/L 115* 114* 106   CO2 mmol/L 22 22 22   ANION GAP mmol/L 10 9 15*   BUN mg/dL 31* 38* 41*   CREATININE mg/dL 0 68 0 99 1 16   CALCIUM mg/dL 8 2* 8 4 8 9   GLUCOSE RANDOM mg/dL 83 97 94   ALT U/L 108*  --  47   AST U/L 390*  --  130*   ALK PHOS U/L 83  --  84   ALBUMIN g/dL 2 9*  --  4 0   TOTAL BILIRUBIN mg/dL 4 07*  --  4 30*     Results from last 7 days   Lab Units 08/29/20  0618   MAGNESIUM mg/dL 2 7*   PHOSPHORUS mg/dL 2 9      Results from last 7 days   Lab Units 08/28/20  0835   INR  1 21*   PTT seconds 30      Results from last 7 days   Lab Units 08/29/20  0244 08/28/20  2338 08/28/20  2053 08/28/20  1649 08/28/20  0835   TROPONIN I ng/mL 0 11* 0 13* 0 16* 0 17* 0 11*     Results from last 7 days   Lab Units 08/28/20  1053 08/28/20  0835   LACTIC ACID mmol/L 1 7 3 5*     ABG:  Results from last 7 days   Lab Units 08/28/20  0918   PH ART  7 270*   PCO2 ART mm Hg 41 9   PO2 ART mm Hg 138 0*   HCO3 ART mmol/L 18 6*   BASE EXC ART mmol/L -7 6*   ABG SOURCE  Radial, Right     VBG:  Results from last 7 days   Lab Units 08/28/20  0918   ABG SOURCE  Radial, Right     Results from last 7 days   Lab Units 08/28/20  0835   PROCALCITONIN ng/ml 11 93*       Micro  Results from last 7 days   Lab Units 08/28/20  0930 08/28/20  0836   BLOOD CULTURE  Received in Microbiology Lab  Culture in Progress  --    GRAM STAIN RESULT   --  Gram positive cocci in clusters*  Gram negative rods*       EKG: SR  Imaging:  I have personally reviewed pertinent reports  and I have personally reviewed pertinent films in PACS    Intake and Output  I/O       08/27 0701 - 08/28 0700 08/28 0701 - 08/29 0700    I V   2381 3    IV Piggyback  100    Total Intake  2481 3    Urine  1505    Total Output  1505    Net  +976 3              Height and Weights   Height: 5' 7" (170 2 cm)  IBW: 66 1 kg  Body mass index is 26 38 kg/m²  Weight (last 2 days)     None            Nutrition       Diet Orders   (From admission, onward)             Start     Ordered    08/29/20 1051  Diet Regular; Regular House  Diet effective now     Question Answer Comment   Diet Type Regular    Regular Regular House    RD to adjust diet per protocol?  Yes        08/29/20 1051                Active Medications  Scheduled Meds:  Current Facility-Administered Medications   Medication Dose Route Frequency Provider Last Rate    acetaminophen  650 mg Oral Q6H PRN Rosita Vegaa, CRNP      cefepime  2,000 mg Intravenous Q12H Odilia Sarah PA-C 2,000 mg (08/29/20 0748)    chlorhexidine  15 mL Swish [employer] Spit Q12H [address] [place] PA-REJI      levETIRAcetam  500 mg Oral Q12H [address] Odilia Sarah PA-C      lidocaine  1 patch Topical Daily Rosita Munoz, CRNP      metroNIDAZOLE  500 mg Intravenous Q8H Rosita Vegaa, CRNP 500 mg (08/29/20 0823)    multi-electrolyte  125 mL/hr Intravenous Continuous Gilese Tammy, CRNP 100 mL/hr (08/29/20 1057)    ondansetron  4 mg Intravenous Q6H PRN Odilia Sarah, PA-C       Continuous Infusions:  multi-electrolyte, 125 mL/hr, Last Rate: 100 mL/hr (08/29/20 1057)      PRN Meds:   acetaminophen, 650 mg, Q6H PRN  ondansetron, 4 mg, Q6H PRN        Allergies   No Known Allergies  ---------------------------------------------------------------------------------------  Advance Directive and Living Will:      Power of :    POLST:    ---------------------------------------------------------------------------------------  Care Time Delivered:   No Critical Care time spent     Olman May PA-C      Portions of the record may have been created with voice recognition software  Occasional wrong word or "sound a like" substitutions may have occurred due to the inherent limitations of voice recognition software    Read the chart carefully and recognize, using context, where substitutions have occurred

## 2020-08-29 NOTE — ASSESSMENT & PLAN NOTE
- Suspected unwitnessed fall with unknown loss of consciousness and the below noted injuries  - Fall precautions  - Geriatric Medicine consultation secondary to baseline dementia, acute encephalopathy, and ISAR >2   - PT and OT evaluation and treatment  - Case Management consultation for disposition planning/expected post acute care facility need

## 2020-08-29 NOTE — ASSESSMENT & PLAN NOTE
- Abrasions to multiple sites including all 4 extremities and his back  - Frequent repositioning, every 2 hours    - Wound nurse following

## 2020-08-29 NOTE — PLAN OF CARE
Problem: Potential for Falls  Goal: Patient will remain free of falls  Description: INTERVENTIONS:  - Assess patient frequently for physical needs  -  Identify cognitive and physical deficits and behaviors that affect risk of falls    -  [place] fall precautions as indicated by assessment   - Educate patient/family on patient safety including physical limitations  - Instruct patient to call for assistance with activity based on assessment  - Modify environment to reduce risk of injury  - Consider OT/PT consult to assist with strengthening/mobility  Outcome: Progressing     Problem: PAIN - ADULT  Goal: Verbalizes/displays adequate comfort level or baseline comfort level  Description: Interventions:  - Encourage patient to monitor pain and request assistance  - Assess pain using appropriate pain scale  - Administer analgesics based on type and severity of pain and evaluate response  - Implement non-pharmacological measures as appropriate and evaluate response  - Consider cultural and social influences on pain and pain management  - Notify physician/advanced practitioner if interventions unsuccessful or patient reports new pain  Outcome: Progressing     Problem: INFECTION - ADULT  Goal: Absence or prevention of progression during hospitalization  Description: INTERVENTIONS:  - Assess and monitor for signs and symptoms of infection  - Monitor lab/diagnostic results  - Monitor all insertion sites, i e  indwelling lines, tubes, and drains  - Monitor endotracheal if appropriate and nasal secretions for changes in amount and color  - [place] appropriate cooling/warming therapies per order  - Administer medications as ordered  - Instruct and encourage patient and family to use good hand hygiene technique  - Identify and instruct in appropriate isolation precautions for identified infection/condition  Outcome: Progressing     Problem: SAFETY ADULT  Goal: Patient will remain free of falls  Description: INTERVENTIONS:  - Assess patient frequently for physical needs  -  Identify cognitive and physical deficits and behaviors that affect risk of falls    -  [place] fall precautions as indicated by assessment   - Educate patient/family on patient safety including physical limitations  - Instruct patient to call for assistance with activity based on assessment  - Modify environment to reduce risk of injury  - Consider OT/PT consult to assist with strengthening/mobility  Outcome: Progressing  Goal: Maintain or return to baseline ADL function  Description: INTERVENTIONS:  -  Assess patient's ability to carry out ADLs; assess patient's baseline for ADL function and identify physical deficits which impact ability to perform ADLs (bathing, care of mouth/teeth, toileting, grooming, dressing, etc )  - Assess/evaluate cause of self-care deficits   - Assess range of motion  - Assess patient's mobility; develop plan if impaired  - Assess patient's need for assistive devices and provide as appropriate  - Encourage maximum independence but intervene and supervise when necessary  - Involve family in performance of ADLs  - Assess for home care needs following discharge   - Consider OT consult to assist with ADL evaluation and planning for discharge  - Provide patient education as appropriate  Outcome: Progressing  Goal: Maintain or return mobility status to optimal level  Description: INTERVENTIONS:  - Assess patient's baseline mobility status (ambulation, transfers, stairs, etc )    - Identify cognitive and physical deficits and behaviors that affect mobility  - Identify mobility aids required to assist with transfers and/or ambulation (gait belt, sit-to-stand, lift, walker, cane, etc )  - [place] fall precautions as indicated by assessment  - Record patient progress and toleration of activity level on Mobility SBAR; progress patient to next Phase/Stage  - Instruct patient to call for assistance with activity based on assessment  - Consider rehabilitation consult to assist with strengthening/weightbearing, etc   Outcome: Progressing     Problem: DISCHARGE PLANNING  Goal: Discharge to home or other facility with appropriate resources  Description: INTERVENTIONS:  - Identify barriers to discharge w/patient and caregiver  - Arrange for needed discharge resources and transportation as appropriate  - Identify discharge learning needs (meds, wound care, etc )  - Arrange for interpretive services to assist at discharge as needed  - Refer to Case Management Department for coordinating discharge planning if the patient needs post-hospital services based on physician/advanced practitioner order or complex needs related to functional status, cognitive ability, or social support system  Outcome: Progressing     Problem: Knowledge Deficit  Goal: Patient/family/caregiver demonstrates understanding of disease process, treatment plan, medications, and discharge instructions  Description: Complete learning assessment and assess knowledge base    Interventions:  - Provide teaching at level of understanding  - Provide teaching via preferred learning methods  Outcome: Progressing     Problem: NEUROSENSORY - ADULT  Goal: Achieves stable or improved neurological status  Description: INTERVENTIONS  - Monitor and report changes in neurological status  - Monitor vital signs such as temperature, blood pressure, glucose, and any other labs ordered   - Initiate measures to prevent increased intracranial pressure  - Monitor for seizure activity and implement precautions if appropriate      Outcome: Progressing  Goal: Remains free of injury related to seizures activity  Description: INTERVENTIONS  - Maintain airway, patient safety  and administer oxygen as ordered  - Monitor patient for seizure activity, document and report duration and description of seizure to physician/advanced practitioner  - If seizure occurs,  ensure patient safety during seizure  - Reorient patient post seizure  - Seizure pads on all 4 side rails  - Instruct patient/family to notify RN of any seizure activity including if an aura is experienced  - Instruct patient/family to call for assistance with activity based on nursing assessment  - Administer anti-seizure medications if ordered    Outcome: Progressing  Goal: Achieves maximal functionality and self care  Description: INTERVENTIONS  - Monitor swallowing and airway patency with patient fatigue and changes in neurological status  - Encourage and assist patient to increase activity and self care     - Encourage visually impaired, hearing impaired and aphasic patients to use assistive/communication devices  Outcome: Progressing     Problem: SKIN/TISSUE INTEGRITY - ADULT  Goal: Skin integrity remains intact  Description: INTERVENTIONS  - Identify patients at risk for skin breakdown  - Assess and monitor skin integrity  - Assess and monitor nutrition and hydration status  - Monitor labs (i e  albumin)  - Assess for incontinence   - Turn and reposition patient  - Assist with mobility/ambulation  - Relieve pressure over bony prominences  - Avoid friction and shearing  - Provide appropriate hygiene as needed including keeping skin clean and dry  - Evaluate need for skin moisturizer/barrier cream  - Collaborate with interdisciplinary team (i e  Nutrition, Rehabilitation, etc )   - Patient/family teaching  Outcome: Progressing  Goal: Incision(s), wounds(s) or drain site(s) healing without S/S of infection  Description: INTERVENTIONS  - Assess and document risk factors for skin impairment   - Assess and document dressing, incision, wound bed, drain sites and surrounding tissue  - Consider nutrition services referral as needed  - Oral mucous membranes remain intact  - Provide patient/ family education  Outcome: Progressing  Goal: Oral mucous membranes remain intact  Description: INTERVENTIONS  - Assess oral mucosa and hygiene practices  - Implement preventative oral hygiene regimen  - Implement oral medicated treatments as ordered  - Initiate Nutrition services referral as needed  Outcome: Progressing     Problem: HEMATOLOGIC - ADULT  Goal: Maintains hematologic stability  Description: INTERVENTIONS  - Assess for signs and symptoms of bleeding or hemorrhage  - Monitor labs  - Administer supportive blood products/factors as ordered and appropriate  Outcome: Progressing

## 2020-08-29 NOTE — PROGRESS NOTES
ICU Transfer Note - Jaky Serrato 1945, 66 y o  male MRN: [id number]    Unit/Bed#: ICU 10 Encounter: [id number]    Primary Care Provider: Yuli Pickard MD   Date and time admitted to hospital: 8/28/2020 12:43 PM        Type 2 diabetes mellitus, without long-term current use of insulin [facility])  Assessment & Plan    Lab Results   Component Value Date    HGBA1C 5 7 (H) 03/16/2020     - Documented history of type 2 diabetes mellitus with questionable use of metformin per only available medication list from February 2018  - Currently without hyperglycemia on initial lab workup and most recent hemoglobin A1c from March 2020 was 5 7   - Will hold off on any medication therapy for diabetes hyperglycemia at this time and check blood sugars every 6 hours while NPO   - A1C pending   - Not on ISS    Pressure injury of back, stage 1  Assessment & Plan  - Suspected stage I pressure injury of the back  - Inpatient wound care consult for assistance with local wound care to multiple wound sites  Fall  Assessment & Plan  - Suspected unwitnessed fall with unknown loss of consciousness and the below noted injuries  - Fall precautions  - Geriatric Medicine consultation secondary to baseline dementia, acute encephalopathy, and ISAR >2   - PT and OT evaluation and treatment  - Case Management consultation for disposition planning/expected post acute care facility need  NAVID (acute kidney injury) ([facility] [address] )  Assessment & Plan  - Acute kidney injury, present on admission  - Continue IV fluid hydration   - DC temple  - Monitor renal function and electrolytes  - Avoid nephrotoxic medications and hypotension  Abrasions of multiple sites  Assessment & Plan  - Abrasions to multiple sites including all 4 extremities and his back  - Frequent repositioning, every 2 hours  - Wound nurse following     Traumatic rhabdomyolysis [facility])  Assessment & Plan  - Traumatic rhabdomyolysis, present on admission, with associated NAVID  Suspect patient suffered a fall and/or multiple falls with possible downtime of unknown duration based on multiple wounds, some appearing to be pressure related, during admission evaluation   - Continue IV fluid hydration   - Trend urine output- Goal 1 5L x 24h  - Trend CPK Q8 until trending down     Encephalopathy acute  Assessment & Plan  - Acute encephalopathy, likely multifactorial in setting of traumatic brain injury, multiple wounds with rhabdomyolysis and acute kidney injury, sepsis  - Urinalysis not suggestive of UTI; urine culture pending   - Blood cultures growing 1/2 GNR, GPC  - Monitor temperature trend and trend leukocytosis with daily CBC   - Inpatient wound care consult for assistance with local wound care to multiple wound sites  Monitor for signs/symptoms of cellulitis or soft tissue infection   - Delirium precautions  - Geriatric Medicine consultation   - Review home medication list as well as past medical history when available  Patient unable to provide history at this time  No known contacts available for the patient at the time of admission and patient's primary care physician offices were closed on Fridays with no on call provider immediately available  - Treatment of known acute and underlying conditions as documented  Dementia [facility])  Assessment & Plan  - Chronic/baseline history of dementia  - Delirium precautions  - Geriatric Medicine following     * TBI (traumatic brain injury) ([facility] [address] )  Assessment & Plan  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 as well as suspected trace subarachnoid hemorrhage in the left parietal region on initial CT scan    - On repeat CT scan of head secondary to concerning pupillary exam change following transferred to [facility], there appears to be slight increase of the bilateral intraventricular hemorrhage, blossoming of the left parietal subarachnoid hemorrhage, bilateral subdural hematomas and suspected bleeding near the brainstem with formal/final radiologic read pending   - Neurosurgery following   - Neurologic exams Q4h  - Avoid all anti-platelet and anticoagulant medications  Only currently available medication list is from February of 2019 from the patient's primary care provider office with no listed anti-platelet and anticoagulant medications, and the patient's coag panel is within normal limits currently  - Repeat CT tomorrow AM   - PT and OT evaluation  - Continue Keppra           Code Status: Level 1 - Full Code  POA:    POLST:      Reason for ICU admission:   Traumatic brain injury     Active problems:   Principal Problem:    TBI (traumatic brain injury) ([facility] [address] )  Active Problems:    Dementia ([facility] [address] )    Encephalopathy acute    Traumatic rhabdomyolysis ([facility] [address] )    Abrasions of multiple sites    NAVID (acute kidney injury) ([facility] [address] )    Fall    Pressure injury of back, stage 1    Type 2 diabetes mellitus, without long-term current use of insulin (HCC)    Subarachnoid hemorrhage (HCC)    Subdural hematoma (HCC)    IVH (intraventricular hemorrhage) (HCC)  Resolved Problems:    * No resolved hospital problems  *      Consultants:   NS  Geriatrics  Wound nurse     History of Present Illness:   66 y o  male who presents as a transfer from [facility] with a traumatic brain injury, multiple wounds on his extremities and back, rhabdomyolysis and acute kidney injury as well as encephalopathy  Patient unable to provide any meaningful history and only repeats that he is feeling pretty good  Minimal history available from medical records as the patient has not been in the [facility] before and has no Care everywhere chart  Records from [facility] also are limited with no known patient emergency contact information    Patient's primary care provider office is far also closed on Fridays with no on-call provider listed to provide additional history  Summary of clinical course:   Patient admitted to the ICU  He was started on IVF  He was started on cefepime and flagyl for concern of infection  Blood cultures growing GNR/GPC   He passed bedside swallow and started on regular diet      Recent or scheduled procedures:   NA    Outstanding/pending diagnostics:   CT brain in AM  Q8 CPK  AM labs     Cultures:   Blood and urine cultures        Mobilization Plan:   PT/OT     Nutrition Plan:   Regular diet     Invasive Devices Review  Invasive Devices     Peripheral Intravenous Line            Peripheral IV 08/28/20 Right Antecubital 1 day    Peripheral IV 08/28/20 Right Hand 1 day          Drain            Urethral Catheter Temperature probe 16 Fr  1 day                Rationale for remaining devices: NA     VTE Pharmacologic Prophylaxis: Pharmacologic VTE Prophylaxis contraindicated due to [address]  VTE Mechanical Prophylaxis: sequential compression device    Discharge Plan:   Patient should be ready for discharge pending PT/OT and medical clearance     Initial Physical Therapy Recommendations: Pendng   Initial Occupational Therapy Recommendations: Pending   Initial /Plan: Pending     Home medications that are not reordered and reason why:   Unknown home medications       Spoke with Gricelda Rodriguez  regarding transfer  Please call [phone] with any questions or concerns  Portions of the record may have been created with voice recognition software  Occasional wrong word or "sound a like" substitutions may have occurred due to the inherent limitations of voice recognition software  Read the chart carefully and recognize, using context, where substitutions have occurred      Deborah Valencia PA-C

## 2020-08-29 NOTE — ASSESSMENT & PLAN NOTE
Lab Results   Component Value Date    HGBA1C 5 7 (H) 03/16/2020     - Documented history of type 2 diabetes mellitus with questionable use of metformin per only available medication list from February 2018    - Currently without hyperglycemia on initial lab workup and most recent hemoglobin A1c from March 2020 was 5 7   - Will hold off on any medication therapy for diabetes hyperglycemia at this time and check blood sugars every 6 hours while NPO   - A1C pending   - Not on ISS

## 2020-08-29 NOTE — ASSESSMENT & PLAN NOTE
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 as well as suspected trace subarachnoid hemorrhage in the left parietal region on initial CT scan  - On repeat CT scan of head secondary to concerning pupillary exam change following transferred to [facility], there appears to be slight increase of the bilateral intraventricular hemorrhage, blossoming of the left parietal subarachnoid hemorrhage, bilateral subdural hematomas and suspected bleeding near the brainstem with formal/final radiologic read pending   - Neurosurgery following   - Neurologic exams Q4h  - Avoid all anti-platelet and anticoagulant medications  Only currently available medication list is from February of 2019 from the patient's primary care provider office with no listed anti-platelet and anticoagulant medications, and the patient's coag panel is within normal limits currently    - Repeat CT tomorrow AM   - PT and OT evaluation  - Continue Keppra

## 2020-08-29 NOTE — DISCHARGE INSTR - DIET
Softer selections from [employer]  Assist w/ tray set up  cut items  Intermittent supervision

## 2020-08-29 NOTE — ASSESSMENT & PLAN NOTE
- Acute encephalopathy, likely multifactorial in setting of traumatic brain injury, multiple wounds with rhabdomyolysis and acute kidney injury, sepsis  - Urinalysis not suggestive of UTI; urine culture pending   - Blood cultures growing 1/2 GNR, GPC  - Monitor temperature trend and trend leukocytosis with daily CBC   - Inpatient wound care consult for assistance with local wound care to multiple wound sites  Monitor for signs/symptoms of cellulitis or soft tissue infection   - Delirium precautions  - Geriatric Medicine consultation   - Review home medication list as well as past medical history when available  Patient unable to provide history at this time  No known contacts available for the patient at the time of admission and patient's primary care physician offices were closed on Fridays with no on call provider immediately available  - Treatment of known acute and underlying conditions as documented

## 2020-08-29 NOTE — RESPIRATORY THERAPY NOTE
RT Protocol Note  Lakisha Christian  66 y o  male MRN: [id number]  Unit/Bed#: ED 21 Encounter: [id number]    Assessment    Principal Problem:    TBI (traumatic brain injury) [facility])  Active Problems:    Dementia ([facility] )    Encephalopathy acute    Traumatic rhabdomyolysis ([place] [address] )    Abrasions of multiple sites    NAVID (acute kidney injury) ([place] [address] )    Fall    Pressure injury of back, stage 1    Type 2 diabetes mellitus, without long-term current use of insulin (HCC)    Subarachnoid hemorrhage (HCC)    Subdural hematoma (HCC)    IVH (intraventricular hemorrhage) ([facility] )      Home Pulmonary Medications:         No past medical history on file    Social History     Socioeconomic History    Marital status: Single     Spouse name: Not on file    Number of children: Not on file    Years of education: Not on file    Highest education level: Not on file   Occupational History    Not on file   Social Needs    Financial resource strain: Not on file    Food insecurity     Worry: Not on file     Inability: Not on file    Transportation needs     Medical: Not on file     Non-medical: Not on file   Tobacco Use    Smoking status: Not on file   Substance and Sexual Activity    Alcohol use: Not on file    Drug use: Not on file    Sexual activity: Not on file   Lifestyle    Physical activity     Days per week: Not on file     Minutes per session: Not on file    Stress: Not on file   Relationships    Social connections     Talks on phone: Not on file     Gets together: Not on file     Attends [employer] service: Not on file     Active member of club or organization: Not on file     Attends meetings of clubs or organizations: Not on file     Relationship status: Not on file    Intimate partner violence     Fear of current or ex partner: Not on file     Emotionally abused: Not on file     Physically abused: Not on file     Forced sexual activity: Not on file   Other Topics Concern    Not on file   Social History Narrative    Not on file       Subjective         Objective    Physical Exam:   Assessment Type: (P) During-treatment  General Appearance: (P) Awake  Respiratory Pattern: (P) Normal  Chest Assessment: (P) Chest expansion symmetrical  Bilateral Breath Sounds: (P) Clear    Vitals:  Blood pressure 120/62, pulse 88, temperature 99 °F (37 2 °C), resp  rate 20, height 5' 7" (1 702 m), SpO2 96 %  Results from last 7 days   Lab Units 08/28/20  0918   PH ART  7 270*   PCO2 ART mm Hg 41 9   PO2 ART mm Hg 138 0*   HCO3 ART mmol/L 18 6*   BASE EXC ART mmol/L -7 6*   O2 CONTENT ART mL/dL 18 0   O2 HGB, ARTERIAL % 96 7   ABG SOURCE  Radial, Right   FRANCESCA TEST  Yes       Imaging and other studies: I have personally reviewed pertinent reports  Plan       Airway Clearance Plan: (P) Incentive Spirometer     Resp Comments: (P) Pt trauma transfer s/p fall with rib fracture per the note (no CXR available for me to view)  Pt has no known pulm hx  He is found to be resting comfortably on rma with no sob  Bs are clear  Pt has a hx of dmentia but was able to perform the IS  Flutter is not indicated at this time  We will cont  to follow his progress and encourage deep breathing

## 2020-08-29 NOTE — ASSESSMENT & PLAN NOTE
- Acute kidney injury, present on admission  - Continue IV fluid hydration   - DC temple  - Monitor renal function and electrolytes  - Avoid nephrotoxic medications and hypotension

## 2020-08-29 NOTE — PROGRESS NOTES
Progress Note - Neurosurgery   Orpha Boy  66 y o  male MRN: [id number]  Unit/Bed#: ICU 10 Encounter: [id number]    Assessment/Plan:    · Multi compartmental intracranial hemorrhage, bilateral subdurals, arachnoid, IVH etc  2nd scans showed some increased hyperdensity in these collections, either 2/2 contrast administration or some mild acute component between 1st and 2nd CT scans, but did not increase in size, cause mass effect, etc    · CTA negative  · Suggest Keppra 1 week  · Repeat CT head 1-2 weeks, to assess evolution  · Avoid AP/AC till then  · No plan for neuro surgical intervention this time  · OK for VTE ppx  · Repeat CTh if GCS drops 2 points or more in 1 h  · Will sign off at this time  Call with questions  History:    Chief Complaint: SDH/SAH/IVH    Subjective: "I want to be discharged "    all current active meds have been reviewed and current meds:   Current Facility-Administered Medications   Medication Dose Route Frequency    acetaminophen (TYLENOL) tablet 650 mg  650 mg Oral Q6H PRN    cefepime (MAXIPIME) 2,000 mg in dextrose 5 % 50 mL IVPB  2,000 mg Intravenous Q12H    chlorhexidine (PERIDEX) 0 12 % oral rinse 15 mL  15 mL Swish & Spit Q12H [address]    levETIRAcetam (KEPPRA) tablet 500 mg  500 mg Oral Q12H [address]    lidocaine (LIDODERM) 5 % patch 1 patch  1 patch Topical Daily    metroNIDAZOLE (FLAGYL) IVPB (premix) 500 mg 100 mL  500 mg Intravenous Q8H    multi-electrolyte (PLASMALYTE-A/ISOLYTE-S PH 7 4) IV solution  125 mL/hr Intravenous Continuous    ondansetron (ZOFRAN) injection 4 mg  4 mg Intravenous Q6H PRN       Objective:     Exam:     Vitals: Blood pressure 92/54, pulse 90, temperature 98 6 °F (37 °C), temperature source Bladder, resp  rate (!) 25, height 5' 7" (1 702 m), SpO2 96 %  ,Body mass index is 26 38 kg/m²  Physical Exam  Skin:     Comments: Mult contusions, lacerations, etc   Neurological:      Gait: Gait is intact        Comments: Strabismus     Psychiatric: Speech: Speech is rapid and pressured  Neurologic Exam     Mental Status   Level of consciousness: alert    Cranial Nerves     CN VII   Facial expression full, symmetric  CN VIII   Hearing: impaired    Motor Exam   Muscle bulk: normal  Overall muscle tone: normal  Grossly non-focal     Sensory Exam   Light touch normal      Gait, Coordination, and Reflexes     Gait  Gait: normal    Tremor   Resting tremor: absent  Intention tremor: absent  Action tremor: absent        MDM:    Lab Results:    Results from last 7 days   Lab Units 08/29/20  0618 08/28/20  2338 08/28/20  1649 08/28/20  0834   WBC Thousand/uL 17 40*  --  19 16* 23 10*   HEMOGLOBIN g/dL 11 9* 11 9* 12 4 13 0*   HEMATOCRIT % 35 3* 36 2* 37 6 38 8*   PLATELETS Thousands/uL 250  --  240 280   NEUTROS PCT % 84*  --  85*  --    MONOS PCT % 9  --  9  --    MONO PCT %  --   --   --  3*     Results from last 7 days   Lab Units 08/29/20  0618 08/28/20  1649 08/28/20  0836   SODIUM mmol/L 147* 145 143   POTASSIUM mmol/L 4 1 3 3* 3 9   CHLORIDE mmol/L 115* 114* 106   CO2 mmol/L 22 22 22   BUN mg/dL 31* 38* 41*   CREATININE mg/dL 0 68 0 99 1 16   CALCIUM mg/dL 8 2* 8 4 8 9   ALK PHOS U/L 83  --  84   ALT U/L 108*  --  47   AST U/L 390*  --  130*   GLUCOSE RANDOM mg/dL 83 97 94     Results from last 7 days   Lab Units 08/28/20  0835   INR  1 21*   PTT seconds 30       Cta Head And Neck W Wo Contrast    Result Date: 8/28/2020  Narrative: CTA NECK AND BRAIN WITH AND WITHOUT CONTRAST INDICATION: altered mental status COMPARISON:   Same date earlier head CT TECHNIQUE:  Routine CT imaging of the Brain without contrast   Post contrast imaging was performed after administration of iodinated contrast through the neck and brain  Post contrast axial 0 625 mm images timed to opacify the arterial system  3D rendering was performed on an independent workstation  MIP reconstructions performed   Coronal reconstructions were performed of the noncontrast portion of the brain  Radiation dose length product (DLP) for this visit:  1480 46 mGy-cm   This examination, like all CT scans performed in the [facility], was performed utilizing techniques to minimize radiation dose exposure, including the use of iterative reconstruction and automated exposure control  IV Contrast:  85 mL of iohexol (OMNIPAQUE)  IMAGE QUALITY:   Diagnostic FINDINGS: NONCONTRAST BRAIN PARENCHYMA: No acute parenchymal hemorrhage  No masslike lesion, mass effect or midline shift  No CT evidence for acute infarct     VENTRICLES AND EXTRA-AXIAL SPACES:  Stable layering blood within bilateral posterior horns  Stable size of ventricular system  No hydrocephalus  Redemonstrated multiple foci of subarachnoid hemorrhage in bilateral frontotemporal regions, left greater than right and small subarachnoid hemorrhage in the perimesencephalic cistern  Unchanged thin subdural blood staining of posterior falx and bilateral tentorium  No new acute intracranial hemorrhage  Hypoattenuating subdural collections over bilateral cerebral hemispheres are stable  It measures up to 8 mm in maximal thickness on the left and 5 mm in the right  VISUALIZED ORBITS AND PARANASAL SINUSES:  Unremarkable  CERVICAL VASCULATURE AORTIC ARCH AND GREAT VESSELS: Aortic arch and great vessel origins are unremarkable  RIGHT VERTEBRAL ARTERY CERVICAL SEGMENT:The vessel origin is unremarkable  The vessel is patent throughout the neck without significant stenosis  LEFT VERTEBRAL ARTERY CERVICAL SEGMENT: The vessel is developmentally smaller than left  The vessel is patent throughout the neck without significant stenosis  RIGHT EXTRACRANIAL CAROTID SEGMENT:Mild atherosclerotic disease at the bifurcation and proximal internal carotid artery  No stenosis or dissection  LEFT EXTRACRANIAL CAROTID SEGMENT: Mild atherosclerotic disease at the bifurcation and proximal internal carotid artery  No stenosis or dissection   NASCET criteria was used to determine the degree of internal carotid artery diameter stenosis  INTRACRANIAL VASCULATURE INTERNAL CAROTID ARTERIES: Mild atherosclerotic disease of cavernous and supraclinoid segments of bilateral internal carotid arteries without critical stenosis  Normal ophthalmic artery origins  ANTERIOR CIRCULATION:  Normal A1 segments  Bilateral anterior cerebral arteries are unremarkable  Normal anterior comminuting artery  MIDDLE CEREBRAL ARTERY CIRCULATION:  Bilateral M1 segments and major M2 branches are patent without significant stenosis  DISTAL VERTEBRAL ARTERIES:  Unremarkable right dominant distal vertebral arteries  Posterior inferior cerebellar artery origins are patent  BASILAR ARTERY:  Basilar artery is normal in caliber  Normal superior cerebellar arteries  POSTERIOR CEREBRAL ARTERIES: Persistent fetal origin of left posterior cerebral artery  Bilateral posterior cerebral arteries are unremarkable without critical stenosis  Normal posterior communicating arteries  DURAL VENOUS SINUSES:  Unremarkable  NON VASCULAR ANATOMY BONY STRUCTURES: No acute or aggressive appearing osseous abnormality  SOFT TISSUES OF THE NECK:  Unremarkable  THORACIC INLET:  Unremarkable  Impression: 1  Grossly stable multifocal extra-axial hemorrhage as described  Stable hypoattenuating subdural collections over bilateral cerebral hemispheres  No significant mass effect  2   Stable small layering blood within posterior horn of bilateral lateral ventricles  3   Patent major vasculature of [place] without significant stenosis  No aneurysm  4   No hemodynamically significant stenosis of major cervical vasculature  Workstation performed: [id number]     Ct Head Wo Contrast    Result Date: 8/28/2020  Narrative: CT BRAIN - WITHOUT CONTRAST INDICATION:   right pupil now fixed  COMPARISON:  August 28, 2020 (8:46 AM) TECHNIQUE:  CT examination of the brain was performed    In addition to axial images, sagittal and coronal 2D reformatted images were created and submitted for interpretation  Radiation dose length product (DLP) for this visit:  909 44 mGy-cm   This examination, like all CT scans performed in the [facility], was performed utilizing techniques to minimize radiation dose exposure, including the use of iterative  reconstruction and automated exposure control  IMAGE QUALITY:  Diagnostic  FINDINGS: PARENCHYMA: Decreased attenuation is noted in periventricular and subcortical white matter demonstrating an appearance that is statistically most likely to represent mild microangiopathic change  No CT signs of acute infarction  No intracranial mass, mass effect or midline shift  No acute parenchymal hemorrhage  VENTRICLES AND EXTRA-AXIAL SPACES:  As was noted on the prior study, there is some layering high density blood dependently within the occipital horns of the lateral ventricles  Compared with the prior study this seems slightly increased in volume although the changes relatively subtle  There are also seems to be some blood layering along the tentorium which appears higher density than it did on the recent prior study  There are also seem to be small bilateral relatively low density subdural collections which are more visible than on the prior study although I do not see any definitive evidence of acute subdural hemorrhage based on this exam  In the left temporal region there is a small amount of subarachnoid hemorrhage within the sulci  This seems to have redistributed and increased in volume as compared with the prior study at which time it was more parietal location  VISUALIZED ORBITS AND PARANASAL SINUSES:  Unremarkable   CALVARIUM AND EXTRACRANIAL SOFT TISSUES:  Normal      Impression: Small amount of acute intracranial hemorrhage, slightly increased from the recent prior study with larger amount of intraventricular blood and left subarachnoid hemorrhage noted as well as bilateral small low-density subdural collections  No critical mass effect or acute infarction or parenchymal hemorrhage identified  The study was marked in [facility] for immediate notification  Workstation performed: [id number]     Trauma - Ct Head Wo Contrast    Result Date: 8/28/2020  Narrative: CT BRAIN - WITHOUT CONTRAST INDICATION:  Fall  COMPARISON:  None TECHNIQUE:  CT examination of the brain was performed  In addition to axial images, sagittal and coronal 2D reformatted images were created and submitted for interpretation  Radiation dose length product (DLP) for this visit:  952 2 mGy-cm   This examination, like all CT scans performed in the [facility], was performed utilizing techniques to minimize radiation dose exposure, including the use of iterative reconstruction and automated exposure control  IMAGE QUALITY:  Diagnostic  FINDINGS: PARENCHYMA:  No intracranial mass, mass effect or midline shift  No CT signs of acute infarction  No acute parenchymal hemorrhage  Question trace gyriform and/or subarachnoid blood in the left parietal region (series 2/26)  Age-related cortical atrophy  Periventricular white matter hypodensity which is nonspecific although most compatible with chronic small vessel ischemic disease  VENTRICLES AND EXTRA-AXIAL SPACES:  There is high attenuation seen along the dependent aspect occipital horns lateral ventricles bilaterally which may represent a small amount of intraventricular hemorrhage  The ventricles are concordant with degree of atrophy, midline position  Mild prominence of the extra-axial CSF spaces likely reflects central atrophy as opposed to subdural hygromas given absence of mass effect  There are ossifications seen along the interhemispheric falx  VISUALIZED ORBITS AND PARANASAL SINUSES:  Unremarkable   CALVARIUM AND EXTRACRANIAL SOFT TISSUES:  Normal      Impression: Small amount of layering intraventricular hemorrhage occipital horns bilaterally  Question trace gyriform or subarachnoid blood left parietal region series 2/26  I personally discussed this study with NIRU MACKAY on 8/28/2020 at 9:49 AM  Workstation performed: [id number]       Imaging Studies: I have personally reviewed pertinent reports     and I have personally reviewed pertinent films in PACS

## 2020-08-29 NOTE — ASSESSMENT & PLAN NOTE
- Traumatic rhabdomyolysis, present on admission, with associated NAVDI    Suspect patient suffered a fall and/or multiple falls with possible downtime of unknown duration based on multiple wounds, some appearing to be pressure related, during admission evaluation   - Continue IV fluid hydration   - Trend urine output- Goal 1 5L x 24h  - Trend CPK Q8 until trending down

## 2020-08-29 NOTE — SPEECH THERAPY NOTE
Speech Language/Pathology  Speech/Language Pathology  Assessment    Patient Name: Lakisha Christian  Today's Date: 8/29/2020     Problem List  Principal Problem:    TBI (traumatic brain injury) ([facility] [address] )  Active Problems:    Dementia ([facility] [address] )    Encephalopathy acute    Traumatic rhabdomyolysis ([facility] [address] )    Abrasions of multiple sites    NAVID (acute kidney injury) ([facility] [address] )    Fall    Pressure injury of back, stage 1    Type 2 diabetes mellitus, without long-term current use of insulin (HCC)    Subarachnoid hemorrhage (HCC)    Subdural hematoma (HCC)    IVH (intraventricular hemorrhage) ([facility] [address] )    Past Medical History  No past medical history on file  Past Surgical History  No past surgical history on file  Bedside Swallow Evaluation:    Summary:  Pt presents w/ adequate mastication of toast despite no upper dentition  No s/s aspiration w/ thin liquids  He is very [place] (staff locating amplification) but was able to read and respond to basic ?'s  Rapid rate of speech  He was able to provide the phone # of his friend to call  Recommendations:  Diet: softer selections from [employer]  Set up tray/cut items  Liquid: thin  Meds:has been taking whole w/ water  Supervision:intermittent  Positioning:Upright  Strategies: Pt to take PO/Meds only when fully alert and upright  Oral care  Aspiration precautions posted  Reflux precautions  Eval only, No f/u tx indicated  Patient's goal: "where's my wallet? When am, I going home?"    Consider consult w/:  Rehab  Neurology  Nutrition    Reason for consult:  R/o aspiration  Determine safest and least restrictive diet  Failed nursing dysphagia assessment, then later passed  Change in mental status  New neuro event  TBI  H/o neurological disease  Current diet:  Npo initially but was asking for food before I arrived and was given thin liquids, crackers, puree  Premorbid diet[de-identified]  regular  Previous VBS:  none  O2 requirement:  none  Voice/Speech:  Rapid rate  Very [place]  distorted ?  Due to rate and missing teeth  Social:  At home w/ periodic caregiver  States he has no family  Follows commands:  basic                   Cognitive Status:  alert  Oral [facility] exam:  Dentition:frontal bottom teeth  Labial strength and ROM:wnl  Lingual strength and ROM:wnl  Mandibular strength and ROM:wnl  Secretion management:wnl    Items administered:  Noted above    Oral stage:wnl/wfl  Lip closure:+  Mastication:+, strong frontal bite  Bolus formation:+  Bolus control:+  Transfer:+  Oral residue:-  Pocketing:-    Pharyngeal stage:wnl  Swallow promptness:+  Laryngeal rise:+  Wet voice:-  Throat clear:-  Cough:-  Secondary swallows:-  Audible swallows:-  No overt s/s aspiration    Esophageal stage:  No s/s reported    Aspiration precautions posted    Results d/w:  Pt, nursing        History of Present Illness         HX and PE limited by: altered mental status   Jesus Venegas  is a 66 y o  male who presents as a transfer from [facility] with SAH  Per chart review, patient was last seen normal last evening when his caretaker left  He was found this morning by his neighbor or caretaker outside and EMS was called  He was altered on EMS evaluation and hypothermic and an infectious work-up was initiated as well as CT scans  He was found to have a large SAH with blood around the brainstem  He is unable to recall any of his medications and is an unreliable historian at this time  His coagulation studies were mildly elevated as well has as his liver studies  It remains unclear if he is on any blood thinners  He had repeat CT head completed which revealed some expansion

## 2020-08-30 ENCOUNTER — APPOINTMENT (INPATIENT)
Dept: RADIOLOGY | Facility: HOSPITAL | Age: 75
DRG: 963 | End: 2020-08-30
Payer: MEDICARE

## 2020-08-30 ENCOUNTER — APPOINTMENT (INPATIENT)
Dept: NON INVASIVE DIAGNOSTICS | Facility: HOSPITAL | Age: 75
DRG: 963 | End: 2020-08-30
Payer: MEDICARE

## 2020-08-30 LAB
ANION GAP SERPL CALCULATED.3IONS-SCNC: 4 MMOL/L (ref 4–13)
BUN SERPL-MCNC: 18 MG/DL (ref 5–25)
CA-I BLD-SCNC: 1.07 MMOL/L (ref 1.12–1.32)
CALCIUM SERPL-MCNC: 7.9 MG/DL (ref 8.3–10.1)
CHLORIDE SERPL-SCNC: 111 MMOL/L (ref 100–108)
CK MB SERPL-MCNC: 12.5 NG/ML (ref 0–5)
CK MB SERPL-MCNC: 23.6 NG/ML (ref 0–5)
CK MB SERPL-MCNC: 33.9 NG/ML (ref 0–5)
CK MB SERPL-MCNC: <1 % (ref 0–2.5)
CK SERPL-CCNC: 9253 U/L (ref 39–308)
CK SERPL-CCNC: ABNORMAL U/L (ref 39–308)
CO2 SERPL-SCNC: 27 MMOL/L (ref 21–32)
CREAT SERPL-MCNC: 0.52 MG/DL (ref 0.6–1.3)
ERYTHROCYTE [DISTWIDTH] IN BLOOD BY AUTOMATED COUNT: 14.1 % (ref 11.6–15.1)
GFR SERPL CREATININE-BSD FRML MDRD: 103 ML/MIN/1.73SQ M
GLUCOSE SERPL-MCNC: 119 MG/DL (ref 65–140)
HCT VFR BLD AUTO: 29.5 % (ref 36.5–49.3)
HCT VFR BLD AUTO: 29.8 % (ref 36.5–49.3)
HCT VFR BLD AUTO: 30.9 % (ref 36.5–49.3)
HCT VFR BLD AUTO: 32.2 % (ref 36.5–49.3)
HGB BLD-MCNC: 10 G/DL (ref 12–17)
HGB BLD-MCNC: 10.1 G/DL (ref 12–17)
HGB BLD-MCNC: 10.3 G/DL (ref 12–17)
HGB BLD-MCNC: 10.3 G/DL (ref 12–17)
LIPASE SERPL-CCNC: 918 U/L (ref 73–393)
MAGNESIUM SERPL-MCNC: 2.2 MG/DL (ref 1.6–2.6)
MCH RBC QN AUTO: 30.9 PG (ref 26.8–34.3)
MCHC RBC AUTO-ENTMCNC: 33.9 G/DL (ref 31.4–37.4)
MCV RBC AUTO: 91 FL (ref 82–98)
MRSA NOSE QL CULT: NORMAL
PLATELET # BLD AUTO: 213 THOUSANDS/UL (ref 149–390)
PMV BLD AUTO: 9.7 FL (ref 8.9–12.7)
POTASSIUM SERPL-SCNC: 3.2 MMOL/L (ref 3.5–5.3)
RBC # BLD AUTO: 3.27 MILLION/UL (ref 3.88–5.62)
SODIUM SERPL-SCNC: 142 MMOL/L (ref 136–145)
WBC # BLD AUTO: 12.34 THOUSAND/UL (ref 4.31–10.16)

## 2020-08-30 PROCEDURE — 85014 HEMATOCRIT: CPT | Performed by: PHYSICIAN ASSISTANT

## 2020-08-30 PROCEDURE — G1004 CDSM NDSC: HCPCS

## 2020-08-30 PROCEDURE — 82553 CREATINE MB FRACTION: CPT | Performed by: PHYSICIAN ASSISTANT

## 2020-08-30 PROCEDURE — 93306 TTE W/DOPPLER COMPLETE: CPT

## 2020-08-30 PROCEDURE — 99232 SBSQ HOSP IP/OBS MODERATE 35: CPT | Performed by: SURGERY

## 2020-08-30 PROCEDURE — 82550 ASSAY OF CK (CPK): CPT | Performed by: PHYSICIAN ASSISTANT

## 2020-08-30 PROCEDURE — 80048 BASIC METABOLIC PNL TOTAL CA: CPT | Performed by: PHYSICIAN ASSISTANT

## 2020-08-30 PROCEDURE — 85027 COMPLETE CBC AUTOMATED: CPT | Performed by: PHYSICIAN ASSISTANT

## 2020-08-30 PROCEDURE — 82330 ASSAY OF CALCIUM: CPT | Performed by: PHYSICIAN ASSISTANT

## 2020-08-30 PROCEDURE — 93306 TTE W/DOPPLER COMPLETE: CPT | Performed by: INTERNAL MEDICINE

## 2020-08-30 PROCEDURE — 83735 ASSAY OF MAGNESIUM: CPT | Performed by: PHYSICIAN ASSISTANT

## 2020-08-30 PROCEDURE — 70450 CT HEAD/BRAIN W/O DYE: CPT

## 2020-08-30 PROCEDURE — 85018 HEMOGLOBIN: CPT | Performed by: PHYSICIAN ASSISTANT

## 2020-08-30 PROCEDURE — 83690 ASSAY OF LIPASE: CPT | Performed by: PHYSICIAN ASSISTANT

## 2020-08-30 RX ADMIN — LEVETIRACETAM 500 MG: 500 TABLET, FILM COATED ORAL at 09:59

## 2020-08-30 RX ADMIN — SODIUM CHLORIDE, SODIUM GLUCONATE, SODIUM ACETATE, POTASSIUM CHLORIDE AND MAGNESIUM CHLORIDE 125 ML/HR: 526; 502; 368; 37; 30 INJECTION, SOLUTION INTRAVENOUS at 23:48

## 2020-08-30 RX ADMIN — METRONIDAZOLE 500 MG: 500 INJECTION, SOLUTION INTRAVENOUS at 17:53

## 2020-08-30 RX ADMIN — LEVETIRACETAM 500 MG: 500 TABLET, FILM COATED ORAL at 21:44

## 2020-08-30 RX ADMIN — CHLORHEXIDINE GLUCONATE 0.12% ORAL RINSE 15 ML: 1.2 LIQUID ORAL at 09:59

## 2020-08-30 RX ADMIN — METRONIDAZOLE 500 MG: 500 INJECTION, SOLUTION INTRAVENOUS at 10:00

## 2020-08-30 RX ADMIN — LIDOCAINE 5% 1 PATCH: 700 PATCH TOPICAL at 09:59

## 2020-08-30 RX ADMIN — CEFEPIME HYDROCHLORIDE 2000 MG: 2 INJECTION, POWDER, FOR SOLUTION INTRAVENOUS at 21:44

## 2020-08-30 RX ADMIN — CHLORHEXIDINE GLUCONATE 0.12% ORAL RINSE 15 ML: 1.2 LIQUID ORAL at 21:44

## 2020-08-30 RX ADMIN — METRONIDAZOLE 500 MG: 500 INJECTION, SOLUTION INTRAVENOUS at 00:29

## 2020-08-30 RX ADMIN — SODIUM CHLORIDE, SODIUM GLUCONATE, SODIUM ACETATE, POTASSIUM CHLORIDE AND MAGNESIUM CHLORIDE 125 ML/HR: 526; 502; 368; 37; 30 INJECTION, SOLUTION INTRAVENOUS at 04:08

## 2020-08-30 RX ADMIN — CEFEPIME HYDROCHLORIDE 2000 MG: 2 INJECTION, POWDER, FOR SOLUTION INTRAVENOUS at 12:01

## 2020-08-30 NOTE — ASSESSMENT & PLAN NOTE
Lab Results   Component Value Date    HGBA1C 5 7 (H) 08/29/2020     - Documented history of type 2 diabetes mellitus with questionable use of metformin per only available medication list from February 2018    - Currently without hyperglycemia on initial lab workup and most recent hemoglobin A1c from March 2020 was 5 7   - Will hold off on any medication therapy for diabetes hyperglycemia at this time and check blood sugars every 6 hours while NPO   - A1C pending   - Not on ISS

## 2020-08-30 NOTE — PROGRESS NOTES
Progress Note - Juli Rdz 1945, 66 y o  male MRN: [id number]    Unit/Bed#: [facility] 603-01 Encounter: [id number]    Primary Care Provider: Mandeep Sadler MD   Date and time admitted to hospital: 8/28/2020 12:43 PM        Type 2 diabetes mellitus, without long-term current use of insulin [facility])  Assessment & Plan    Lab Results   Component Value Date    HGBA1C 5 7 (H) 08/29/2020     - Documented history of type 2 diabetes mellitus with questionable use of metformin per only available medication list from February 2018  - Currently without hyperglycemia on initial lab workup and most recent hemoglobin A1c from March 2020 was 5 7   - Will hold off on any medication therapy for diabetes hyperglycemia at this time and check blood sugars every 6 hours while NPO   - A1C pending   - Not on ISS    Pressure injury of back, stage 1  Assessment & Plan  - Suspected stage I pressure injury of the back  - Inpatient wound care consult for assistance with local wound care to multiple wound sites  Fall  Assessment & Plan  - Suspected unwitnessed fall with unknown loss of consciousness and the below noted injuries  - Fall precautions  - Geriatric Medicine consultation secondary to baseline dementia, acute encephalopathy, and ISAR >2   - PT and OT evaluation and treatment  - Case Management consultation for disposition planning/expected post acute care facility need  NAVID (acute kidney injury) ([facility] [address] )  Assessment & Plan  - Acute kidney injury, present on admission  - Continue IV fluid hydration   - Monitor renal function and electrolytes  - Avoid nephrotoxic medications and hypotension  Abrasions of multiple sites  Assessment & Plan  - Abrasions to multiple sites including all 4 extremities and his back  - Frequent repositioning, every 2 hours  - Wound nurse following     Traumatic rhabdomyolysis [facility])  Assessment & Plan  - Traumatic rhabdomyolysis, present on admission, with associated NAVID    Suspect patient suffered a fall and/or multiple falls with possible downtime of unknown duration based on multiple wounds, some appearing to be pressure related, during admission evaluation   - Continue IV fluid hydration   - Trend urine output- Goal 1 5L x 24h  - Trend CPK Q8 until trending down     Encephalopathy acute  Assessment & Plan  - Acute encephalopathy, likely multifactorial in setting of traumatic brain injury, multiple wounds with rhabdomyolysis and acute kidney injury, sepsis  - Urinalysis not suggestive of UTI; urine culture pending   - Blood cultures growing 1/2 GNR, GPC  - Monitor temperature trend and trend leukocytosis with daily CBC   - Inpatient wound care consult for assistance with local wound care to multiple wound sites  Monitor for signs/symptoms of cellulitis or soft tissue infection   - Delirium precautions  - Geriatric Medicine consultation   - Review home medication list as well as past medical history when available  Patient unable to provide history at this time  No known contacts available for the patient at the time of admission and patient's primary care physician offices were closed on Fridays with no on call provider immediately available  - Treatment of known acute and underlying conditions as documented  Dementia [facility])  Assessment & Plan  - Chronic/baseline history of dementia  - Delirium precautions  - Geriatric Medicine following     * TBI (traumatic brain injury) ([facility] [address] )  Assessment & Plan  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 as well as suspected trace subarachnoid hemorrhage in the left parietal region on initial CT scan    - On repeat CT scan of head secondary to concerning pupillary exam change following transferred to [facility], there appears to be slight increase of the bilateral intraventricular hemorrhage, blossoming of the left parietal subarachnoid hemorrhage, bilateral subdural hematomas and suspected bleeding near the brainstem with formal/final radiologic read pending   - Neurosurgery following   - Neurologic exams Q4h  - Avoid all anti-platelet and anticoagulant medications  Only currently available medication list is from February of 2019 from the patient's primary care provider office with no listed anti-platelet and anticoagulant medications, and the patient's coag panel is within normal limits currently  - Repeat CT tomorrow AM   - PT and OT evaluation  - Continue Keppra               Disposition: pending resolution of active inpatient problems      SUBJECTIVE:  - no acute events overnight        OBJECTIVE:     Meds/Allergies     Current Facility-Administered Medications:     acetaminophen (TYLENOL) tablet 650 mg, 650 mg, Oral, Q6H PRN, Wilmon Crumbly, PA-C    cefepime (MAXIPIME) 2,000 mg in dextrose 5 % 50 mL IVPB, 2,000 mg, Intravenous, Q12H, Wilmon Crumbly, PA-C, Last Rate: 100 mL/hr at 08/29/20 1830, 2,000 mg at 08/29/20 1830    chlorhexidine (PERIDEX) 0 12 % oral rinse 15 mL, 15 mL, Swish & Spit, Q12H [address], Wilmon Crumbly, PA-C, 15 mL at 08/29/20 2140    levETIRAcetam (KEPPRA) tablet 500 mg, 500 mg, Oral, Q12H [address], Wilmon Crumbly, PA-C, 500 mg at 08/29/20 2140    lidocaine (LIDODERM) 5 % patch 1 patch, 1 patch, Topical, Daily, Wilmon Crumbly, PA-C, 1 patch at 08/29/20 0823    metroNIDAZOLE (FLAGYL) IVPB (premix) 500 mg 100 mL, 500 mg, Intravenous, Q8H, Cory Koroma, PA-C, Last Rate: 200 mL/hr at 08/30/20 0029, 500 mg at 08/30/20 0029    multi-electrolyte (PLASMALYTE-A/ISOLYTE-S PH 7 4) IV solution, 125 mL/hr, Intravenous, Continuous, Wilmon Crumbly, PA-C, Last Rate: 125 mL/hr at 08/30/20 0408, 125 mL/hr at 08/30/20 0408    ondansetron (ZOFRAN) injection 4 mg, 4 mg, Intravenous, Q6H PRN, Dotty Montes PA-C     Vitals:   Vitals:    08/29/20 2220   BP: 115/71   Pulse: 83   Resp: 18   Temp:    SpO2: 99%       Intake/Output:  I/O       08/28 0701 - 08/29 0700 08/29 0701 - 08/30 0700 08/30 0701 - 08/31 0700    P  O   1840     I V  2381 3 3126 8     IV Piggyback 100 200     Total Intake 2481 3 5166 8     Urine 1505 381     Total Output 1505 381     Net +976 3 +4785 8            Unmeasured Urine Occurrence  3 x            Nutrition/GI Proph/Bowel Reg: regular    Physical Exam:   GENERAL APPEARANCE: NAD  NEURO: alert, obeys command albeit with somewhat slowed response  HEENT: abrasions noted, normocephalic  CV: RRR  LUNGS: CTA B/L  GI: soft, ND/NT     MSK: moves all extremities  SKIN: warm and dry    Invasive Devices     Peripheral Intravenous Line            Peripheral IV 08/28/20 Right Antecubital 2 days    Peripheral IV 08/28/20 Right Hand 2 days                 Lab Results: Results: I have personally reviewed pertinent reports  Imaging/EKG Studies: Results: I have personally reviewed pertinent reports      Other Studies:   VTE Prophylaxis: Sequential compression device (Venodyne)

## 2020-08-30 NOTE — ASSESSMENT & PLAN NOTE
- Traumatic rhabdomyolysis, present on admission, with associated NAVID    Suspect patient suffered a fall and/or multiple falls with possible downtime of unknown duration based on multiple wounds, some appearing to be pressure related, during admission evaluation   - Continue IV fluid hydration   - Trend urine output- Goal 1 5L x 24h  - Trend CPK Q8 until trending down

## 2020-08-30 NOTE — PLAN OF CARE
Problem: Potential for Falls  Goal: Patient will remain free of falls  Description: INTERVENTIONS:  - Assess patient frequently for physical needs  -  Identify cognitive and physical deficits and behaviors that affect risk of falls    -  [place] fall precautions as indicated by assessment   - Educate patient/family on patient safety including physical limitations  - Instruct patient to call for assistance with activity based on assessment  - Modify environment to reduce risk of injury  - Consider OT/PT consult to assist with strengthening/mobility  Outcome: Progressing     Problem: PAIN - ADULT  Goal: Verbalizes/displays adequate comfort level or baseline comfort level  Description: Interventions:  - Encourage patient to monitor pain and request assistance  - Assess pain using appropriate pain scale  - Administer analgesics based on type and severity of pain and evaluate response  - Implement non-pharmacological measures as appropriate and evaluate response  - Consider cultural and social influences on pain and pain management  - Notify physician/advanced practitioner if interventions unsuccessful or patient reports new pain  Outcome: Progressing     Problem: INFECTION - ADULT  Goal: Absence or prevention of progression during hospitalization  Description: INTERVENTIONS:  - Assess and monitor for signs and symptoms of infection  - Monitor lab/diagnostic results  - Monitor all insertion sites, i e  indwelling lines, tubes, and drains  - Monitor endotracheal if appropriate and nasal secretions for changes in amount and color  - [place] appropriate cooling/warming therapies per order  - Administer medications as ordered  - Instruct and encourage patient and family to use good hand hygiene technique  - Identify and instruct in appropriate isolation precautions for identified infection/condition  Outcome: Progressing     Problem: SAFETY ADULT  Goal: Patient will remain free of falls  Description: INTERVENTIONS:  - Assess patient frequently for physical needs  -  Identify cognitive and physical deficits and behaviors that affect risk of falls    -  [place] fall precautions as indicated by assessment   - Educate patient/family on patient safety including physical limitations  - Instruct patient to call for assistance with activity based on assessment  - Modify environment to reduce risk of injury  - Consider OT/PT consult to assist with strengthening/mobility  Outcome: Progressing  Goal: Maintain or return to baseline ADL function  Description: INTERVENTIONS:  -  Assess patient's ability to carry out ADLs; assess patient's baseline for ADL function and identify physical deficits which impact ability to perform ADLs (bathing, care of mouth/teeth, toileting, grooming, dressing, etc )  - Assess/evaluate cause of self-care deficits   - Assess range of motion  - Assess patient's mobility; develop plan if impaired  - Assess patient's need for assistive devices and provide as appropriate  - Encourage maximum independence but intervene and supervise when necessary  - Involve family in performance of ADLs  - Assess for home care needs following discharge   - Consider OT consult to assist with ADL evaluation and planning for discharge  - Provide patient education as appropriate  Outcome: Progressing  Goal: Maintain or return mobility status to optimal level  Description: INTERVENTIONS:  - Assess patient's baseline mobility status (ambulation, transfers, stairs, etc )    - Identify cognitive and physical deficits and behaviors that affect mobility  - Identify mobility aids required to assist with transfers and/or ambulation (gait belt, sit-to-stand, lift, walker, cane, etc )  - [place] fall precautions as indicated by assessment  - Record patient progress and toleration of activity level on Mobility SBAR; progress patient to next Phase/Stage  - Instruct patient to call for assistance with activity based on assessment  - Consider rehabilitation consult to assist with strengthening/weightbearing, etc   Outcome: Progressing     Problem: DISCHARGE PLANNING  Goal: Discharge to home or other facility with appropriate resources  Description: INTERVENTIONS:  - Identify barriers to discharge w/patient and caregiver  - Arrange for needed discharge resources and transportation as appropriate  - Identify discharge learning needs (meds, wound care, etc )  - Arrange for interpretive services to assist at discharge as needed  - Refer to Case Management Department for coordinating discharge planning if the patient needs post-hospital services based on physician/advanced practitioner order or complex needs related to functional status, cognitive ability, or social support system  Outcome: Progressing     Problem: Knowledge Deficit  Goal: Patient/family/caregiver demonstrates understanding of disease process, treatment plan, medications, and discharge instructions  Description: Complete learning assessment and assess knowledge base    Interventions:  - Provide teaching at level of understanding  - Provide teaching via preferred learning methods  Outcome: Progressing     Problem: NEUROSENSORY - ADULT  Goal: Achieves stable or improved neurological status  Description: INTERVENTIONS  - Monitor and report changes in neurological status  - Monitor vital signs such as temperature, blood pressure, glucose, and any other labs ordered   - Initiate measures to prevent increased intracranial pressure  - Monitor for seizure activity and implement precautions if appropriate      Outcome: Progressing  Goal: Remains free of injury related to seizures activity  Description: INTERVENTIONS  - Maintain airway, patient safety  and administer oxygen as ordered  - Monitor patient for seizure activity, document and report duration and description of seizure to physician/advanced practitioner  - If seizure occurs,  ensure patient safety during seizure  - Reorient patient post seizure  - Seizure pads on all 4 side rails  - Instruct patient/family to notify RN of any seizure activity including if an aura is experienced  - Instruct patient/family to call for assistance with activity based on nursing assessment  - Administer anti-seizure medications if ordered    Outcome: Progressing  Goal: Achieves maximal functionality and self care  Description: INTERVENTIONS  - Monitor swallowing and airway patency with patient fatigue and changes in neurological status  - Encourage and assist patient to increase activity and self care     - Encourage visually impaired, hearing impaired and aphasic patients to use assistive/communication devices  Outcome: Progressing     Problem: SKIN/TISSUE INTEGRITY - ADULT  Goal: Skin integrity remains intact  Description: INTERVENTIONS  - Identify patients at risk for skin breakdown  - Assess and monitor skin integrity  - Assess and monitor nutrition and hydration status  - Monitor labs (i e  albumin)  - Assess for incontinence   - Turn and reposition patient  - Assist with mobility/ambulation  - Relieve pressure over bony prominences  - Avoid friction and shearing  - Provide appropriate hygiene as needed including keeping skin clean and dry  - Evaluate need for skin moisturizer/barrier cream  - Collaborate with interdisciplinary team (i e  Nutrition, Rehabilitation, etc )   - Patient/family teaching  Outcome: Progressing  Goal: Incision(s), wounds(s) or drain site(s) healing without S/S of infection  Description: INTERVENTIONS  - Assess and document risk factors for skin impairment   - Assess and document dressing, incision, wound bed, drain sites and surrounding tissue  - Consider nutrition services referral as needed  - Oral mucous membranes remain intact  - Provide patient/ family education  Outcome: Progressing  Goal: Oral mucous membranes remain intact  Description: INTERVENTIONS  - Assess oral mucosa and hygiene practices  - Implement preventative oral hygiene regimen  - Implement oral medicated treatments as ordered  - Initiate Nutrition services referral as needed  Outcome: Progressing     Problem: HEMATOLOGIC - ADULT  Goal: Maintains hematologic stability  Description: INTERVENTIONS  - Assess for signs and symptoms of bleeding or hemorrhage  - Monitor labs  - Administer supportive blood products/factors as ordered and appropriate  Outcome: Progressing     Problem: Prexisting or High Potential for Compromised Skin Integrity  Goal: Skin integrity is maintained or improved  Description: INTERVENTIONS:  - Identify patients at risk for skin breakdown  - Assess and monitor skin integrity  - Assess and monitor nutrition and hydration status  - Monitor labs   - Assess for incontinence   - Turn and reposition patient  - Assist with mobility/ambulation  - Relieve pressure over bony prominences  - Avoid friction and shearing  - Provide appropriate hygiene as needed including keeping skin clean and dry  - Evaluate need for skin moisturizer/barrier cream  - Collaborate with interdisciplinary team   - Patient/family teaching  - Consider wound care consult   Outcome: Progressing

## 2020-08-30 NOTE — ASSESSMENT & PLAN NOTE
- Acute kidney injury, present on admission  - Continue IV fluid hydration   - Monitor renal function and electrolytes  - Avoid nephrotoxic medications and hypotension

## 2020-08-30 NOTE — ASSESSMENT & PLAN NOTE
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 as well as suspected trace subarachnoid hemorrhage in the left parietal region on initial CT scan  - On repeat CT scan of head secondary to concerning pupillary exam change following transferred to [facility], there appears to be slight increase of the bilateral intraventricular hemorrhage, blossoming of the left parietal subarachnoid hemorrhage, bilateral subdural hematomas and suspected bleeding near the brainstem with formal/final radiologic read pending   - Neurosurgery following   - Neurologic exams Q4h  - Avoid all anti-platelet and anticoagulant medications  Only currently available medication list is from February of 2019 from the patient's primary care provider office with no listed anti-platelet and anticoagulant medications, and the patient's coag panel is within normal limits currently    - Repeat CT tomorrow AM   - PT and OT evaluation  - Continue Keppra

## 2020-08-31 LAB
ANION GAP SERPL CALCULATED.3IONS-SCNC: 7 MMOL/L (ref 4–13)
BUN SERPL-MCNC: 14 MG/DL (ref 5–25)
CALCIUM SERPL-MCNC: 7.7 MG/DL (ref 8.3–10.1)
CHLORIDE SERPL-SCNC: 110 MMOL/L (ref 100–108)
CK MB SERPL-MCNC: 7.5 NG/ML (ref 0–5)
CK MB SERPL-MCNC: <1 % (ref 0–2.5)
CK SERPL-CCNC: 5777 U/L (ref 39–308)
CO2 SERPL-SCNC: 27 MMOL/L (ref 21–32)
CREAT SERPL-MCNC: 0.68 MG/DL (ref 0.6–1.3)
ERYTHROCYTE [DISTWIDTH] IN BLOOD BY AUTOMATED COUNT: 14.1 % (ref 11.6–15.1)
GFR SERPL CREATININE-BSD FRML MDRD: 92 ML/MIN/1.73SQ M
GLUCOSE SERPL-MCNC: 165 MG/DL (ref 65–140)
HCT VFR BLD AUTO: 28.9 % (ref 36.5–49.3)
HGB BLD-MCNC: 9.8 G/DL (ref 12–17)
MAGNESIUM SERPL-MCNC: 2.1 MG/DL (ref 1.6–2.6)
MCH RBC QN AUTO: 30.7 PG (ref 26.8–34.3)
MCHC RBC AUTO-ENTMCNC: 33.9 G/DL (ref 31.4–37.4)
MCV RBC AUTO: 91 FL (ref 82–98)
PLATELET # BLD AUTO: 204 THOUSANDS/UL (ref 149–390)
PMV BLD AUTO: 9.6 FL (ref 8.9–12.7)
POTASSIUM SERPL-SCNC: 3.3 MMOL/L (ref 3.5–5.3)
RBC # BLD AUTO: 3.19 MILLION/UL (ref 3.88–5.62)
SODIUM SERPL-SCNC: 144 MMOL/L (ref 136–145)
WBC # BLD AUTO: 10.68 THOUSAND/UL (ref 4.31–10.16)

## 2020-08-31 PROCEDURE — 82553 CREATINE MB FRACTION: CPT | Performed by: PHYSICIAN ASSISTANT

## 2020-08-31 PROCEDURE — 97167 OT EVAL HIGH COMPLEX 60 MIN: CPT

## 2020-08-31 PROCEDURE — 82550 ASSAY OF CK (CPK): CPT | Performed by: PHYSICIAN ASSISTANT

## 2020-08-31 PROCEDURE — 85027 COMPLETE CBC AUTOMATED: CPT | Performed by: PHYSICIAN ASSISTANT

## 2020-08-31 PROCEDURE — 83735 ASSAY OF MAGNESIUM: CPT | Performed by: PHYSICIAN ASSISTANT

## 2020-08-31 PROCEDURE — 80048 BASIC METABOLIC PNL TOTAL CA: CPT | Performed by: PHYSICIAN ASSISTANT

## 2020-08-31 PROCEDURE — 97163 PT EVAL HIGH COMPLEX 45 MIN: CPT

## 2020-08-31 PROCEDURE — 99232 SBSQ HOSP IP/OBS MODERATE 35: CPT | Performed by: SURGERY

## 2020-08-31 PROCEDURE — 99222 1ST HOSP IP/OBS MODERATE 55: CPT | Performed by: ORTHOPAEDIC SURGERY

## 2020-08-31 RX ORDER — HEPARIN SODIUM 5000 [USP'U]/ML
5000 INJECTION, SOLUTION INTRAVENOUS; SUBCUTANEOUS EVERY 8 HOURS SCHEDULED
Status: DISCONTINUED | OUTPATIENT
Start: 2020-08-31 | End: 2020-09-15 | Stop reason: HOSPADM

## 2020-08-31 RX ORDER — POTASSIUM CHLORIDE 20 MEQ/1
40 TABLET, EXTENDED RELEASE ORAL ONCE
Status: COMPLETED | OUTPATIENT
Start: 2020-08-31 | End: 2020-08-31

## 2020-08-31 RX ADMIN — METRONIDAZOLE 500 MG: 500 INJECTION, SOLUTION INTRAVENOUS at 08:51

## 2020-08-31 RX ADMIN — CHLORHEXIDINE GLUCONATE 0.12% ORAL RINSE 15 ML: 1.2 LIQUID ORAL at 21:12

## 2020-08-31 RX ADMIN — METRONIDAZOLE 500 MG: 500 INJECTION, SOLUTION INTRAVENOUS at 02:56

## 2020-08-31 RX ADMIN — CEFEPIME HYDROCHLORIDE 2000 MG: 2 INJECTION, POWDER, FOR SOLUTION INTRAVENOUS at 08:47

## 2020-08-31 RX ADMIN — CHLORHEXIDINE GLUCONATE 0.12% ORAL RINSE 15 ML: 1.2 LIQUID ORAL at 08:44

## 2020-08-31 RX ADMIN — POTASSIUM CHLORIDE 40 MEQ: 1500 TABLET, EXTENDED RELEASE ORAL at 14:29

## 2020-08-31 RX ADMIN — METRONIDAZOLE 500 MG: 500 INJECTION, SOLUTION INTRAVENOUS at 17:14

## 2020-08-31 RX ADMIN — LEVETIRACETAM 500 MG: 500 TABLET, FILM COATED ORAL at 08:44

## 2020-08-31 RX ADMIN — HEPARIN SODIUM 5000 UNITS: 5000 INJECTION INTRAVENOUS; SUBCUTANEOUS at 14:29

## 2020-08-31 RX ADMIN — LEVETIRACETAM 500 MG: 500 TABLET, FILM COATED ORAL at 21:12

## 2020-08-31 RX ADMIN — HEPARIN SODIUM 5000 UNITS: 5000 INJECTION INTRAVENOUS; SUBCUTANEOUS at 21:12

## 2020-08-31 RX ADMIN — CEFEPIME HYDROCHLORIDE 2000 MG: 2 INJECTION, POWDER, FOR SOLUTION INTRAVENOUS at 19:30

## 2020-08-31 NOTE — DISCHARGE INSTRUCTIONS
Neurosurgery discharge instructions following traumatic head bleed:      Do not take any blood thinning medications (ie  No Advil  No motrin  No ibuprofen  No Aleve  No Aspirin  No fishoil  No heparin  No antiplatelet / no anticoagulation medication)   Refrain from activity that increases chance of trauma to head or falls  Recommend you take fall precaution   No strenuous activity or sports   Return to hospital Emergency Room if you experience worsening / new headache, nausea/vomiting, speech/vision change, seizure, confusion / mental status change, weakness, or other neurological changes  Follow-up as scheduled with a repeat CT head without contrast to be completed 2-3 days prior to visit  Prescription has been entered electronically  Please call  to schedule  Discharge Instructions - Orthopedics  Marisol Lopez  66 y o  male MRN: [id number]  Unit/Bed#: [facility] 603-01    Weight Bearing Status:                                           Non weight bearing right upper extremity in sling  Pain:  Continue analgesics as directed    Appt Instructions: If you do not have your appointment, please call the clinic at [phone] t  Otherwise followup as scheduled     Contact the office sooner if you experience any increased numbness/tingling in the extremities

## 2020-08-31 NOTE — OCCUPATIONAL THERAPY NOTE
Occupational Therapy Evaluation     Patient Name: Sonya Tomlinson  Today's Date: 8/31/2020  Problem List  Principal Problem:    TBI (traumatic brain injury) ([facility] [address] )  Active Problems:    Dementia ([facility] )    Encephalopathy acute    Traumatic rhabdomyolysis ([facility] )    Abrasions of multiple sites    NAVID (acute kidney injury) ([facility] )    Fall    Pressure injury of back, stage 1    Type 2 diabetes mellitus, without long-term current use of insulin (HCC)    Subarachnoid hemorrhage (HCC)    Subdural hematoma (HCC)    IVH (intraventricular hemorrhage) ([facility] )    Past Medical History  No past medical history on file  Past Surgical History  No past surgical history on file             08/31/20 0915   Note Type   Note type Eval/Treat   Restrictions/Precautions   Weight Bearing Precautions Per Order Yes   RUE Weight Bearing Per Order WBAT   Pain Assessment   Pain Assessment Tool 0-10   Pain Score 2   Pain Location/Orientation Location: Buttocks   Home Living   Type of [address] level   Additional Comments Pt extremely [place] - requires all communication via writing   Prior Function   Level of [place] Independent with ADLs and functional mobility   Lives With Alone   Receives Help From Friend(s)   ADL Assistance Independent   IADLs Independent   Falls in the last 6 months 1 to 4   Vocational Retired   Comments pt reports he was fully I with adls, mobility with RW and I adls including driving, medication and financial management    Lifestyle   Autonomy I adls, mobility with RW and iadls including medication management, financial management and driving per pt report    Reciprocal Relationships limited - reports friend who checks on him regularly    Service to Others retired   Intrinsic Gratification mostly sedentary    Subjective   Subjective c/o pain in buttocks area   Tim[place] 7  Independent   Grooming Assistance 5  [address]  Assistance 2  Maximal Assistance   UB Dressing Assistance 4  Minimal Assistance   LB Dressing Assistance 2  Maximal Assistance   Toileting Assistance  3  Moderate Assistance   Bed Mobility   Additional Comments oob in chair    Transfers   Sit to Stand 4  Minimal assistance   Stand to Sit 4  Minimal assistance   Stand pivot 4  Minimal assistance   Toilet transfer 4  Minimal assistance   Functional Mobility   Functional Mobility 4  Minimal assistance   Additional items Rolling walker   Balance   Static Sitting Fair   Dynamic Sitting Fair -   Static Standing Poor +   Dynamic Standing Poor +   Ambulatory Poor   Activity Tolerance   Activity Tolerance Patient limited by fatigue;Treatment limited secondary to medical complications (Comment)   Medical Staff Made Aware Ivette Patch, DPT    RUE Assessment   RUE Assessment WFL   LUE Assessment   LUE Assessment WFL   Cognition   Overall Cognitive Status Impaired   Arousal/Participation Arousable; Cooperative   Attention Attends with cues to redirect   Orientation Level Oriented to person;Oriented to place;Oriented to time;Oriented to situation   Memory Decreased short term memory;Decreased recall of recent events;Decreased recall of precautions   Following Commands Follows one step commands with increased time or repetition   Comments distractable with limited insight/safety awareness    Assessment   Limitation Decreased ADL status; Decreased Safe judgement during ADL;Decreased endurance;Decreased self-care trans;Decreased high-level ADLs   Prognosis Good;Fair   Assessment Pt is a 66 y o  male who [facility] with TBI (traumatic brain injury) ([facility] [address] ) s/p fall at home - down for unknown period of time   Pt's problem list also includes PMH of DM, limited hearing, previous surgery, limited cognition and dementia  At baseline pt was completing adls and mobility independently with RW  Pt lives alone in [address] home with no TANNER   Currently pt requires moderate assist for overall ADLS and min assist  for functional mobility/transfers  Pt currently presents with impairments in the following categories -limited home support, difficulty performing ADLS, difficulty performing IADLS , limited insight into deficits and health management  activity tolerance, endurance, standing balance/tolerance, insight, safety  and judgement   These impairments, as well as pt's fatigue, decreased caregiver support and risk for falls  limit pt's ability to safely engage in all baseline areas of occupation, includingbathing, dressing, toileting, functional mobility/transfers, community mobility, laundry , driving, house maintenance, medication management, meal prep, cleaning, social participation  and leisure activities  From OT standpoint, recommend inpt rehab with possible need for alternate living arrangements in future pending progress and support available  OT will continue to follow to address the below stated goals      Goals   Patient Goals go home    LTG Time Frame 10-14   Recommendation   Recommendation Geriatric Consult   OT Discharge Recommendation Post-Acute Rehabilitation Services       OCCUPATIONAL THERAPY GOALS:    *Mod I adls after setup with use of AE PRN  *Mod I toileting and clothing management   *Mod I functional mobility and transfers to/from all surfaces with good dynamic balance and safety for participation in dynamic adls and iadl tasks   *Demonstrate good carryover with safe use of RW during functional tasks   *Assess DME needs   *Increase activity tolerance to 35-40 minutes for participation in adls and enjoyable activities  *Pt to participate in ongoing functional cognitive assessment with good attention/participation to assist with safe d/c recommendations    Chip [place], OT

## 2020-08-31 NOTE — CONSULTS
Orthopedics   Gigi Conception  66 y o  male MRN: [id number]  Unit/Bed#: [facility] 603-01      Chief Complaint:   right shoulder radiographic abnormality    HPI:  66 y  o male presented to [facility] as a transfer with an acute TBI and rhabdomyolysis  On work-up, patient was found to have radiographic abnormality of right shoulder  Patient unable to provide history 2/2 to [facility], but can participate in physical exam  No shoulder pain  Full ROM compared to contralateral extremity  Review Of Systems:   · Skin: See below  · Neuro: See HPI  · Musculoskeletal: See HPI  · 14 point review of systems negative except as stated above     Past Medical History:   No past medical history on file  Past Surgical History:   No past surgical history on file  Family History:  Family history reviewed and non-contributory  No family history on file      Social History:  Social History     Socioeconomic History    Marital status: Single     Spouse name: Not on file    Number of children: Not on file    Years of education: Not on file    Highest education level: Not on file   Occupational History    Not on file   Social Needs    Financial resource strain: Not on file    Food insecurity     Worry: Not on file     Inability: Not on file    Transportation needs     Medical: Not on file     Non-medical: Not on file   Tobacco Use    Smoking status: Not on file   Substance and Sexual Activity    Alcohol use: Not on file    Drug use: Not on file    Sexual activity: Not on file   Lifestyle    Physical activity     Days per week: Not on file     Minutes per session: Not on file    Stress: Not on file   Relationships    Social connections     Talks on phone: Not on file     Gets together: Not on file     Attends [employer] service: Not on file     Active member of club or organization: Not on file     Attends meetings of clubs or organizations: Not on file     Relationship status: Not on file    Intimate partner violence     Fear of current or ex partner: Not on file     Emotionally abused: Not on file     Physically abused: Not on file     Forced sexual activity: Not on file   Other Topics Concern    Not on file   Social History Narrative    Not on file       Allergies:   No Known Allergies        Labs:  0   Lab Value Date/Time    HCT 30 9 (L) 08/30/2020 1828    HCT 29 8 (L) 08/30/2020 0643    HCT 29 5 (L) 08/30/2020 0642    HCT 41 3 (L) 04/11/2018 0743    HGB 10 3 (L) 08/30/2020 1828    HGB 10 1 (L) 08/30/2020 0643    HGB 10 0 (L) 08/30/2020 0642    HGB 14 3 04/11/2018 0743    INR 1 21 (H) 08/28/2020 0835    WBC 12 34 (H) 08/30/2020 0643    WBC 17 40 (H) 08/29/2020 0618    WBC 19 16 (H) 08/28/2020 1649    WBC 7 7 04/11/2018 0743       Meds:    Current Facility-Administered Medications:     acetaminophen (TYLENOL) tablet 650 mg, 650 mg, Oral, Q6H PRN, Olman May PA-C    cefepime (MAXIPIME) 2,000 mg in dextrose 5 % 50 mL IVPB, 2,000 mg, Intravenous, Q12H, Olman May PA-C, Last Rate: 100 mL/hr at 08/30/20 2144, 2,000 mg at 08/30/20 2144    chlorhexidine (PERIDEX) 0 12 % oral rinse 15 mL, 15 mL, Swish & Spit, Q12H [address], Clequin May PA-C, 15 mL at 08/30/20 2144    levETIRAcetam (KEPPRA) tablet 500 mg, 500 mg, Oral, Q12H [address], Clequin May PA-C, 500 mg at 08/30/20 2144    lidocaine (LIDODERM) 5 % patch 1 patch, 1 patch, Topical, Daily, Olman May PASANTINO, 1 patch at 08/30/20 0959    metroNIDAZOLE (FLAGYL) IVPB (premix) 500 mg 100 mL, 500 mg, Intravenous, Q8H, Cory Koroma PA-C, Last Rate: 200 mL/hr at 08/31/20 0256, 500 mg at 08/31/20 0256    multi-electrolyte (PLASMALYTE-A/ISOLYTE-S PH 7 4) IV solution, 125 mL/hr, Intravenous, Continuous, Olman May PA-C, Last Rate: 125 mL/hr at 08/30/20 2348, 125 mL/hr at 08/30/20 2348    ondansetron (ZOFRAN) injection 4 mg, 4 mg, Intravenous, Q6H PRN, Olman May PA-C    Blood Culture:   Lab Results   Component Value Date    BLOODCX No Growth at 48 hrs  08/28/2020       Wound Culture:   No results found for: WOUNDCULT    Ins and Outs:  I/O last 24 hours: In: 4418 8 [P O :420; I V :3998 8]  Out: 581 [Urine:581]          Physical Exam:   /69   Pulse 85   Temp 98 2 °F (36 8 °C)   Resp 18   Ht 5' 7" (1 702 m)   SpO2 99%   BMI 26 38 kg/m²   Gen: Alert and oriented to person, place, time  HEENT: EOMI, eyes clear, moist mucus membranes, hearing intact  Respiratory: Bilateral chest rise  No audible wheezing found  Cardiovascular: Regular Rate and Rhythm  Abdomen: soft nontender/nondistended  Musculoskeletal: right upper extremity  · Skin pink dry and intact  · Non-painful range of motion  · No effusion  · Sensation intact to axillary, musculocutaneous, radial, ulna, median nerves  · 5/5 motor strength to axillary, musculocutaneous, radial, ulna, median nerves  · Palpable radial pulse     Radiology:   I personally reviewed the films  X-rays of right shoulder shows no acute fracture  Degenerative changes noted    _*_*_*_*_*_*_*_*_*_*_*_*_*_*_*_*_*_*_*_*_*_*_*_*_*_*_*_*_*_*_*_*_*_*_*_*_*_*_*_*_*    Assessment:  66 y  o male with  right shoulder radiographic abnormality     Plan:   · WBAT RUE  · Pain per primary team  · DVT PPx per primary team  · PT/OT  · Dispo: Ortho will follow      Nusrat Fischer MD

## 2020-08-31 NOTE — RESTORATIVE TECHNICIAN NOTE
Restorative Specialist Mobility Note       Activity: Ambulate in bey, [place] privileges, Chair     Assistive Device: Front wheel walker

## 2020-08-31 NOTE — PHYSICAL THERAPY NOTE
PHYSICAL THERAPY EVALUATION  NAME:  Catherine Contreras  DATE: 08/31/20    AGE:   66 y o  Mrn:   [id number]  ADMIT DX:  [facility] brain [facility] with loss of [facility], [facility] [[id number]]    No past medical history on file  No past surgical history on file  Length Of Stay: 3    PHYSICAL THERAPY EVALUATION:        08/31/20 8797   Note Type   Note type Eval only   Pain Assessment   Pain Assessment Tool Pain Assessment not indicated - pt denies pain   Pain Score No Pain   Home Living   Type of [address] One level  (0 TANNER )   Home Equipment Walker   Additional Comments Pt is very [place]  Pt requires PT to write down questions for him to answer  Pt questionable historian  Pt states that he lives alone and has a friend who checks in and assists him  Need to clarify home setup and level of support  Prior Function   Level of [place] Independent with ADLs and functional mobility   Lives With Alone   Receives Help From Friend(s)   ADL Assistance Independent   Falls in the last 6 months 1 to 4  (1 as per pt )   Comments Pt reports the use of a RW for ambulation PTA  Pt questionable historian and need to clarify this     Restrictions/Precautions   Weight Bearing Precautions Per Order Yes   RUE Weight Bearing Per Order WBAT   General   Additional Pertinent History Pt extremely [place] and requires therapist to use note pad to communicate    Family/Caregiver Present No   Cognition   Overall Cognitive Status Impaired   Arousal/Participation Alert   Orientation Level Oriented to person;Oriented to place;Oriented to time   Memory Decreased recall of recent events   Following Commands Follows one step commands with increased time or repetition   RLE Assessment   RLE Assessment WFL   Strength RLE   RLE Overall Strength 4-/5   LLE Assessment   LLE Assessment WFL   Strength LLE   LLE Overall Strength 4-/5   Bed Mobility   Additional Comments NA, Pt seated OOB in chair at time of PT eval Transfers   Sit to Stand 4  Minimal assistance   Additional items Assist x 1; Increased time required;Verbal cues; Impulsive   Stand to Sit 4  Minimal assistance   Additional items Assist x 1; Increased time required;Verbal cues; Impulsive   Toilet transfer 4  Minimal assistance   Additional items Assist x 1; Increased time required;Verbal cues   Additional Comments VC and TC needed for hand placement and safety    Ambulation/Elevation   Gait pattern Excessively slow; Short stride; Foward flexed; Inconsistent lisette   Gait Assistance 4  Minimal assist   Additional items Assist x 1   Assistive Device Rolling walker   Distance 15ft x 2    Balance   Static Sitting Fair -   Static Standing Poor +   Ambulatory Poor +   Endurance Deficit   Endurance Deficit Yes   Endurance Deficit Description fatigue    Activity Tolerance   Activity Tolerance Patient limited by fatigue   Medical Staff Made Tana Garcia OT    Nurse Made Aware Pt appropriate to be seen and mobilize per nsg    Assessment   Prognosis Good   Problem List Decreased strength;Decreased endurance; Impaired balance;Decreased mobility; Decreased cognition; Impaired judgement;Decreased safety awareness; Impaired hearing;Decreased skin integrity   Assessment Pt is 66 y o  male seen for PT evaluation s/p admit [facility] on 8/28/2020  Two pt identifiers were used to confirm  Pt presented s/p fall  Pt originally presented at [facility] and was transferred to [facility] for further medical management  Pt was admitted with a primary dx of: TBI, SAH, metabolic encephalopathy, traumatic rhabdomyolysis, abrasions of multiple sites, dementia     PT now consulted for assessment of mobility and d/c needs  Pt with Up with assistance orders  Pts current co morbidities affecting treatment include: no PMH on file and personal factors including living alone   Pts current clinical presentation is Unstable/ Unpredictable (high complexity) due to Ongoing medical management for primary dx, Increased reliance on more restrictive AD compared to baseline, Decreased activity tolerance compared to baseline, Fall risk, Increased assistance needed from caregiver at current time, Cog status, Continuous pulse oximetry monitoring     Prior to admission, pt was I with ambulation with the use of a RW as per pt  Upon evaluation, pt currently is requiring  Min Ax1 for transfers and Min Ax1 for ambulation w/ RW   Pt denies any lightheadedness or dizziness with ambulation  Pt presents at PT eval functioning below baseline and currently w/ overall mobility deficits 2* to: BLE weakness, impaired balance, decreased endurance, gait deviations, pain, decreased activity tolerance compared to baseline, decreased safety awareness, impaired judgement, fall risk, decreased skin integrity, decreased cognition  Pt currently at a fall risk 2* to impairments listed above  Based on the aforementioned PT evaluation, pt will continue to benefit from skilled Acute PT interventions to address stated impairments; to maximize functional mobility; for ongoing pt/ family training; and DME needs  At conclusion of PT session pt returned back in chair and chair alarm engaged with phone and call bell within reach  Pt denies any further questions at this time  PT is currently recommending Rehab  Pt/ family agreeable to plan and goals as stated on evaluation  PT will continue to follow during hospital stay  Barriers to Discharge Inaccessible home environment;Decreased caregiver support   Goals   Patient Goals "to go home"    STG Expiration Date 09/10/20   Short Term Goal #1 In 10 days pt will complete: 1) Bed mobility skills with S to increase safety and independence as well as decrease caregiver burden  2) Functional transfers with S to promote increased independence, safety, and QOL in the home environment   3) Ambulate 150' using least restrictive AD with S without LOB and stable vitals so that pt can negotiate home environment safely and promote independence with functional mobility and return to PLOF  4) Improve balance grades to Good to increase safety with all mobility and decrease fall risk  5) Improve BLE strength by 1/2 grade to help increase overall functional mobility and decrease fall risk  Plan   Treatment/Interventions Functional transfer training;LE strengthening/ROM; Therapeutic exercise; Endurance training;Patient/family training;Equipment eval/education; Bed mobility;Gait training;Spoke to nursing;OT   PT Frequency Other (Comment)  (3-6x a week )   Recommendation   PT Discharge Recommendation Post-Acute Rehabilitation Services   Equipment Recommended Walker   PT - OK to Discharge Yes  (to rehab when medically cleared )   Modified [place] Scale   Modified [place] Scale 4   Barthel Index   Feeding 10   Bathing 0   Grooming Score 5   Dressing Score 5   Bladder Score 10   Bowels Score 10   Toilet Use Score 5   Transfers (Bed/Chair) Score 10   Mobility (Level Surface) Score 0   Stairs Score 0   Barthel Index Score 55   Sydney Guido, PT

## 2020-08-31 NOTE — PLAN OF CARE
Problem: Potential for Falls  Goal: Patient will remain free of falls  Description: INTERVENTIONS:  - Assess patient frequently for physical needs  -  Identify cognitive and physical deficits and behaviors that affect risk of falls    -  [place] fall precautions as indicated by assessment   - Educate patient/family on patient safety including physical limitations  - Instruct patient to call for assistance with activity based on assessment  - Modify environment to reduce risk of injury  - Consider OT/PT consult to assist with strengthening/mobility  Outcome: Progressing     Problem: PAIN - ADULT  Goal: Verbalizes/displays adequate comfort level or baseline comfort level  Description: Interventions:  - Encourage patient to monitor pain and request assistance  - Assess pain using appropriate pain scale  - Administer analgesics based on type and severity of pain and evaluate response  - Implement non-pharmacological measures as appropriate and evaluate response  - Consider cultural and social influences on pain and pain management  - Notify physician/advanced practitioner if interventions unsuccessful or patient reports new pain  Outcome: Progressing     Problem: INFECTION - ADULT  Goal: Absence or prevention of progression during hospitalization  Description: INTERVENTIONS:  - Assess and monitor for signs and symptoms of infection  - Monitor lab/diagnostic results  - Monitor all insertion sites, i e  indwelling lines, tubes, and drains  - Monitor endotracheal if appropriate and nasal secretions for changes in amount and color  - [place] appropriate cooling/warming therapies per order  - Administer medications as ordered  - Instruct and encourage patient and family to use good hand hygiene technique  - Identify and instruct in appropriate isolation precautions for identified infection/condition  Outcome: Progressing     Problem: SAFETY ADULT  Goal: Patient will remain free of falls  Description: INTERVENTIONS:  - Assess patient frequently for physical needs  -  Identify cognitive and physical deficits and behaviors that affect risk of falls    -  [place] fall precautions as indicated by assessment   - Educate patient/family on patient safety including physical limitations  - Instruct patient to call for assistance with activity based on assessment  - Modify environment to reduce risk of injury  - Consider OT/PT consult to assist with strengthening/mobility  Outcome: Progressing  Goal: Maintain or return to baseline ADL function  Description: INTERVENTIONS:  -  Assess patient's ability to carry out ADLs; assess patient's baseline for ADL function and identify physical deficits which impact ability to perform ADLs (bathing, care of mouth/teeth, toileting, grooming, dressing, etc )  - Assess/evaluate cause of self-care deficits   - Assess range of motion  - Assess patient's mobility; develop plan if impaired  - Assess patient's need for assistive devices and provide as appropriate  - Encourage maximum independence but intervene and supervise when necessary  - Involve family in performance of ADLs  - Assess for home care needs following discharge   - Consider OT consult to assist with ADL evaluation and planning for discharge  - Provide patient education as appropriate  Outcome: Progressing  Goal: Maintain or return mobility status to optimal level  Description: INTERVENTIONS:  - Assess patient's baseline mobility status (ambulation, transfers, stairs, etc )    - Identify cognitive and physical deficits and behaviors that affect mobility  - Identify mobility aids required to assist with transfers and/or ambulation (gait belt, sit-to-stand, lift, walker, cane, etc )  - [place] fall precautions as indicated by assessment  - Record patient progress and toleration of activity level on Mobility SBAR; progress patient to next Phase/Stage  - Instruct patient to call for assistance with activity based on assessment  - Consider rehabilitation consult to assist with strengthening/weightbearing, etc   Outcome: Progressing     Problem: DISCHARGE PLANNING  Goal: Discharge to home or other facility with appropriate resources  Description: INTERVENTIONS:  - Identify barriers to discharge w/patient and caregiver  - Arrange for needed discharge resources and transportation as appropriate  - Identify discharge learning needs (meds, wound care, etc )  - Arrange for interpretive services to assist at discharge as needed  - Refer to Case Management Department for coordinating discharge planning if the patient needs post-hospital services based on physician/advanced practitioner order or complex needs related to functional status, cognitive ability, or social support system  Outcome: Progressing     Problem: Knowledge Deficit  Goal: Patient/family/caregiver demonstrates understanding of disease process, treatment plan, medications, and discharge instructions  Description: Complete learning assessment and assess knowledge base    Interventions:  - Provide teaching at level of understanding  - Provide teaching via preferred learning methods  Outcome: Progressing     Problem: NEUROSENSORY - ADULT  Goal: Achieves stable or improved neurological status  Description: INTERVENTIONS  - Monitor and report changes in neurological status  - Monitor vital signs such as temperature, blood pressure, glucose, and any other labs ordered   - Initiate measures to prevent increased intracranial pressure  - Monitor for seizure activity and implement precautions if appropriate      Outcome: Progressing  Goal: Remains free of injury related to seizures activity  Description: INTERVENTIONS  - Maintain airway, patient safety  and administer oxygen as ordered  - Monitor patient for seizure activity, document and report duration and description of seizure to physician/advanced practitioner  - If seizure occurs,  ensure patient safety during seizure  - Reorient patient post seizure  - Seizure pads on all 4 side rails  - Instruct patient/family to notify RN of any seizure activity including if an aura is experienced  - Instruct patient/family to call for assistance with activity based on nursing assessment  - Administer anti-seizure medications if ordered    Outcome: Progressing  Goal: Achieves maximal functionality and self care  Description: INTERVENTIONS  - Monitor swallowing and airway patency with patient fatigue and changes in neurological status  - Encourage and assist patient to increase activity and self care     - Encourage visually impaired, hearing impaired and aphasic patients to use assistive/communication devices  Outcome: Progressing     Problem: SKIN/TISSUE INTEGRITY - ADULT  Goal: Skin integrity remains intact  Description: INTERVENTIONS  - Identify patients at risk for skin breakdown  - Assess and monitor skin integrity  - Assess and monitor nutrition and hydration status  - Monitor labs (i e  albumin)  - Assess for incontinence   - Turn and reposition patient  - Assist with mobility/ambulation  - Relieve pressure over bony prominences  - Avoid friction and shearing  - Provide appropriate hygiene as needed including keeping skin clean and dry  - Evaluate need for skin moisturizer/barrier cream  - Collaborate with interdisciplinary team (i e  Nutrition, Rehabilitation, etc )   - Patient/family teaching  Outcome: Progressing  Goal: Incision(s), wounds(s) or drain site(s) healing without S/S of infection  Description: INTERVENTIONS  - Assess and document risk factors for skin impairment   - Assess and document dressing, incision, wound bed, drain sites and surrounding tissue  - Consider nutrition services referral as needed  - Oral mucous membranes remain intact  - Provide patient/ family education  Outcome: Progressing  Goal: Oral mucous membranes remain intact  Description: INTERVENTIONS  - Assess oral mucosa and hygiene practices  - Implement preventative oral hygiene regimen  - Implement oral medicated treatments as ordered  - Initiate Nutrition services referral as needed  Outcome: Progressing     Problem: HEMATOLOGIC - ADULT  Goal: Maintains hematologic stability  Description: INTERVENTIONS  - Assess for signs and symptoms of bleeding or hemorrhage  - Monitor labs  - Administer supportive blood products/factors as ordered and appropriate  Outcome: Progressing     Problem: Prexisting or High Potential for Compromised Skin Integrity  Goal: Skin integrity is maintained or improved  Description: INTERVENTIONS:  - Identify patients at risk for skin breakdown  - Assess and monitor skin integrity  - Assess and monitor nutrition and hydration status  - Monitor labs   - Assess for incontinence   - Turn and reposition patient  - Assist with mobility/ambulation  - Relieve pressure over bony prominences  - Avoid friction and shearing  - Provide appropriate hygiene as needed including keeping skin clean and dry  - Evaluate need for skin moisturizer/barrier cream  - Collaborate with interdisciplinary team   - Patient/family teaching  - Consider wound care consult   Outcome: Progressing

## 2020-08-31 NOTE — PHYSICAL THERAPY NOTE
PHYSICAL THERAPY EVALUATION  NAME:  Davis Garcia  DATE: 08/31/20    AGE:   66 y o  Mrn:   [id number]  ADMIT DX:  [facility] [U00 [id number]]    No past medical history on file  No past surgical history on file  Length Of Stay: 3    PHYSICAL THERAPY EVALUATION:        08/31/20 7252   Note Type   Note type Eval only   Pain Assessment   Pain Assessment Tool 0-10   Pain Score 2   Pain Location/Orientation Location: Buttocks   Pain Onset/Description Onset: Ongoing;Frequency: Constant/Continuous; Descriptor: Aching   Effect of Pain on Daily Activities increased pain with activity    Patient's Stated Pain Goal No pain   Hospital Pain Intervention(s) Ambulation/increased activity;Repositioned   Home Living   Type of [address] One level  (0 TANNER )   Home Equipment Walker   Additional Comments Pt is very [place]  Pt requires PT to write down questions for him to answer  Pt questionable historian  Pt states that he lives alone and has a friend who checks in and assists him  Need to clarify home setup and level of support  Prior Function   Level of [place] Independent with ADLs and functional mobility   Lives With Alone   Receives Help From Friend(s)   ADL Assistance Independent   Falls in the last 6 months 1 to 4  (1 as per pt )   Comments Pt reports the use of a RW for ambulation PTA  Pt questionable historian and need to clarify this     Restrictions/Precautions   Weight Bearing Precautions Per Order Yes   RUE Weight Bearing Per Order WBAT   General   Additional Pertinent History Pt extremely [place] and requires therapist to use note pad to communicate    Family/Caregiver Present No   Cognition   Overall Cognitive Status Impaired   Arousal/Participation Alert   Orientation Level Oriented to person;Oriented to place;Oriented to time   Memory Decreased recall of recent events   Following Commands Follows one step commands with increased time or repetition   RLE Assessment   RLE Assessment WFL   Strength RLE   RLE Overall Strength 4-/5   LLE Assessment   LLE Assessment WFL   Strength LLE   LLE Overall Strength 4-/5   Bed Mobility   Additional Comments NA, Pt seated OOB in chair at time of PT eval    Transfers   Sit to Stand 4  Minimal assistance   Additional items Assist x 1; Increased time required;Verbal cues; Impulsive   Stand to Sit 4  Minimal assistance   Additional items Assist x 1; Increased time required;Verbal cues; Impulsive   Toilet transfer 4  Minimal assistance   Additional items Assist x 1; Increased time required;Verbal cues   Additional Comments VC and TC needed for hand placement and safety    Ambulation/Elevation   Gait pattern Excessively slow; Short stride; Foward flexed; Inconsistent lisette   Gait Assistance 4  Minimal assist   Additional items Assist x 1   Assistive Device Rolling walker   Distance 15ft x 2    Balance   Static Sitting Fair -   Static Standing Poor +   Ambulatory Poor +   Endurance Deficit   Endurance Deficit Yes   Endurance Deficit Description fatigue    Activity Tolerance   Activity Tolerance Patient limited by fatigue   Medical Staff Valeria Clemens, OT    Nurse Made Aware Pt appropriate to be seen and mobilize per nsg    Assessment   Prognosis Good   Problem List Decreased strength;Decreased endurance; Impaired balance;Decreased mobility; Decreased cognition; Impaired judgement;Decreased safety awareness; Impaired hearing;Decreased skin integrity   Assessment Pt is 66 y o  male seen for PT evaluation s/p admit to [facility] on 8/28/2020  Two pt identifiers were used to confirm  Pt presented s/p fall  Pt originally presented [facility] and was [facility] to [facility] for [facility] medical management  Pt was admitted with a primary dx of: TBI, SAH, metabolic encephalopathy, traumatic rhabdomyolysis, abrasions of multiple sites, dementia     PT now consulted for assessment of mobility and d/c needs   Pt with Up with assistance orders  Pts current co morbidities affecting treatment include: no PMH on file and personal factors including living alone  Pts current clinical presentation is Unstable/ Unpredictable (high complexity) due to Ongoing medical management for primary dx, Increased reliance on more restrictive AD compared to baseline, Decreased activity tolerance compared to baseline, Fall risk, Increased assistance needed from caregiver at current time, Cog status, Continuous pulse oximetry monitoring     Prior to admission, pt was I with ambulation with the use of a RW as per pt  Upon evaluation, pt currently is requiring Min Ax1 for transfers and Min Ax1 for ambulation w/ RW   Pt denies any lightheadedness or dizziness with ambulation  Pt presents at PT eval functioning below baseline and currently w/ overall mobility deficits 2* to: BLE weakness, impaired balance, decreased endurance, gait deviations, pain, decreased activity tolerance compared to baseline, decreased safety awareness, impaired judgement, fall risk, decreased skin integrity, decreased cognition  Pt currently at a fall risk 2* to impairments listed above  Based on the aforementioned PT evaluation, pt will continue to benefit from skilled Acute PT interventions to address stated impairments; to maximize functional mobility; for ongoing pt/ family training; and DME needs  At conclusion of PT session pt returned back in chair and chair alarm engaged with phone and call bell within reach  Pt denies any further questions at this time  PT is currently recommending Rehab  Pt/ family agreeable to plan and goals as stated on evaluation  PT will continue to follow during hospital stay     Barriers to Discharge Inaccessible home environment;Decreased caregiver support   Goals   Patient Goals "to go home"    STG Expiration Date 09/10/20   Short Term Goal #1 In 10 days pt will complete: 1) Bed mobility skills with S to increase safety and independence as well as decrease caregiver burden  2) Functional transfers with S to promote increased independence, safety, and QOL in the home environment  3) Ambulate 150' using least restrictive AD with S without LOB and stable vitals so that pt can negotiate home environment safely and promote independence with functional mobility and return to PLOF  4) Improve balance grades to Good to increase safety with all mobility and decrease fall risk  5) Improve BLE strength by 1/2 grade to help increase overall functional mobility and decrease fall risk  Plan   Treatment/Interventions Functional transfer training;LE strengthening/ROM; Therapeutic exercise; Endurance training;Patient/family training;Equipment eval/education; Bed mobility;Gait training;Spoke to nursing;OT   PT Frequency Other (Comment)  (3-6x a week )   Recommendation   PT Discharge Recommendation Post-Acute Rehabilitation Services   Equipment Recommended Walker   PT - OK to Discharge Yes  (to rehab when medically cleared )   Modified [place] Scale   Modified [place] Scale 4   Barthel Index   Feeding 10   Bathing 0   Grooming Score 5   Dressing Score 5   Bladder Score 10   Bowels Score 10   Toilet Use Score 5   Transfers (Bed/Chair) Score 10   Mobility (Level Surface) Score 0   Stairs Score 0   Barthel Index Score 55   Shahid Cruz, PT

## 2020-08-31 NOTE — PLAN OF CARE
Problem: PHYSICAL THERAPY ADULT  Goal: Performs mobility at highest level of function for planned discharge setting  See evaluation for individualized goals  Description: Treatment/Interventions: Functional transfer training, LE strengthening/ROM, Therapeutic exercise, Endurance training, Patient/family training, Equipment eval/education, Bed mobility, Gait training, Spoke to nursing, OT  Equipment Recommended: Marti Casas       See flowsheet documentation for full assessment, interventions and recommendations  Note: Prognosis: Good  Problem List: Decreased strength, Decreased endurance, Impaired balance, Decreased mobility, Decreased cognition, Impaired judgement, Decreased safety awareness, Impaired hearing, Decreased skin integrity  Assessment: Pt is 66 y o  male seen for PT evaluation s/p admit to [facility] on 8/28/2020  Two pt identifiers were used to confirm  Pt presented s/p fall  Pt originally presented at [facility] and was transferred to [facility] for further medical management  Pt was admitted with a primary dx of: TBI, SAH, metabolic encephalopathy, traumatic rhabdomyolysis, abrasions of multiple sites, dementia     PT now consulted for assessment of mobility and d/c needs  Pt with Up with assistance orders  Pts current co morbidities affecting treatment include: no PMH on file and personal factors including living alone  Pts current clinical presentation is Unstable/ Unpredictable (high complexity) due to Ongoing medical management for primary dx, Increased reliance on more restrictive AD compared to baseline, Decreased activity tolerance compared to baseline, Fall risk, Increased assistance needed from caregiver at current time, Cog status, Continuous pulse oximetry monitoring     Prior to admission, pt was I with ambulation with the use of a RW as per pt  Upon evaluation, pt currently is requiring Min Ax1 for bed mobility; Min Ax1 for transfers and Min Ax1 for ambulation w/ RW    Pt denies any lightheadedness or dizziness with ambulation  Pt presents at PT eval functioning below baseline and currently w/ overall mobility deficits 2* to: BLE weakness, impaired balance, decreased endurance, gait deviations, pain, decreased activity tolerance compared to baseline, decreased safety awareness, impaired judgement, fall risk, decreased skin integrity, decreased cognition  Pt currently at a fall risk 2* to impairments listed above  Based on the aforementioned PT evaluation, pt will continue to benefit from skilled Acute PT interventions to address stated impairments; to maximize functional mobility; for ongoing pt/ family training; and DME needs  At conclusion of PT session pt returned back in chair and chair alarm engaged with phone and call bell within reach  Pt denies any further questions at this time  PT is currently recommending Rehab  Pt/ family agreeable to plan and goals as stated on evaluation  PT will continue to follow during hospital stay  Barriers to Discharge: Inaccessible home environment, Decreased caregiver support     PT Discharge Recommendation: [id number]     PT - OK to Discharge: Yes(to rehab when medically cleared )    See flowsheet documentation for full assessment

## 2020-08-31 NOTE — PLAN OF CARE
Problem: OCCUPATIONAL THERAPY ADULT  Goal: Performs self-care activities at highest level of function for planned discharge setting  See evaluation for individualized goals  Description:            See flowsheet documentation for full assessment, interventions and recommendations  Note: Limitation: Decreased ADL status, Decreased Safe judgement during ADL, Decreased endurance, Decreased self-care trans, Decreased high-level ADLs  Prognosis: Good, Fair  Assessment: Pt is a 66 y o  male who was admitted to [facility] on 8/28/2020 with TBI (traumatic brain injury) ([facility] [address] ) s/p fall at home - down for unknown period of time   Pt's problem list also includes PMH of DM, limited hearing, previous surgery, limited cognition and dementia  At baseline pt was completing adls and mobility independently with RW  Pt lives alone in 1 story home with no TANNER  Currently pt requires moderate assist for overall ADLS and min assist  for functional mobility/transfers  Pt currently presents with impairments in the following categories -limited home support, difficulty performing ADLS, difficulty performing IADLS , limited insight into deficits and health management  activity tolerance, endurance, standing balance/tolerance, insight, safety  and judgement   These impairments, as well as pt's fatigue, decreased caregiver support and risk for falls  limit pt's ability to safely engage in all baseline areas of occupation, includingbathing, dressing, toileting, functional mobility/transfers, community mobility, laundry , driving, house maintenance, medication management, meal prep, cleaning, social participation  and leisure activities  From OT standpoint, recommend inpt rehab with possible need for alternate living arrangements in future pending progress and support available  OT will continue to follow to address the below stated goals     Recommendation: (S) Geriatric Consult  OT Discharge Recommendation: Post-Acute Rehabilitation Services

## 2020-08-31 NOTE — PHYSICAL THERAPY NOTE
PHYSICAL THERAPY EVALUATION  NAME:  Malachi Record  DATE: 08/31/20    AGE:   66 y o  Mrn:   [id number]  ADMIT DX:  [facility] with loss of consciousness[facility] [L13 [id number]]    No past medical history on file  No past surgical history on file  Length Of Stay: 3    PHYSICAL THERAPY EVALUATION:        08/31/20 3259   Note Type   Note type Eval only   Pain Assessment   Pain Assessment Tool Pain Assessment not indicated - pt denies pain   Pain Score No Pain   Home Living   Type of [address]  (0 TANNER )   Home Equipment Walker   Additional Comments Pt is very [place]  Pt requires PT to write down questions for him to answer  Pt questionable historian  Pt states that he lives alone and has a friend who checks in and assists him  Need to clarify home setup and level of support  Prior Function   Level of [place] Independent with ADLs and functional mobility   Lives With Alone   Receives Help From Friend(s)   ADL Assistance Independent   Falls in the last 6 months 1 to 4  (1 as per pt )   Comments Pt reports the use of a RW for ambulation PTA  Pt questionable historian and need to clarify this     Restrictions/Precautions   Weight Bearing Precautions Per Order Yes   RUE Weight Bearing Per Order WBAT   General   Additional Pertinent History Pt extremely [place] and requires therapist to use note pad to communicate    Family/Caregiver Present No   Cognition   Overall Cognitive Status Impaired   Arousal/Participation Alert   Orientation Level Oriented to person;Oriented to place;Oriented to time   Memory Decreased recall of recent events   Following Commands Follows one step commands with increased time or repetition   RLE Assessment   RLE Assessment WFL   Strength RLE   RLE Overall Strength 4-/5   LLE Assessment   LLE Assessment WFL   Strength LLE   LLE Overall Strength 4-/5   Bed Mobility   Additional Comments NA, Pt seated OOB in chair at time of PT eval Transfers   Sit to Stand 4  Minimal assistance   Additional items Assist x 1; Increased time required;Verbal cues; Impulsive   Stand to Sit 4  Minimal assistance   Additional items Assist x 1; Increased time required;Verbal cues; Impulsive   Additional Comments VC and TC needed for hand placement and safety    Ambulation/Elevation   Gait pattern Excessively slow; Short stride; Foward flexed; Inconsistent lisette   Gait Assistance 4  Minimal assist   Additional items Assist x 1   Assistive Device Rolling walker   Distance 15ft x 2    Balance   Static Sitting Fair -   Static Standing Poor +   Ambulatory Poor +   Endurance Deficit   Endurance Deficit Yes   Endurance Deficit Description fatigue    Activity Tolerance   Activity Tolerance Patient limited by fatigue   Medical Staff Valeria Thompson OT    Nurse Made Aware Pt appropriate to be seen and mobilize per nsg    Assessment   Prognosis Good   Problem List Decreased strength;Decreased endurance; Impaired balance;Decreased mobility; Decreased cognition; Impaired judgement;Decreased safety awareness; Impaired hearing;Decreased skin integrity   Assessment Pt is 66 y o  male seen for PT evaluation s/p admit to [facility] on 8/28/2020  Two pt identifiers were used to confirm  Pt presented s/p fall  Pt originally presented at [facility] and was transferred to [facility] for further medical management  Pt was admitted with a primary dx of: TBI, SAH, metabolic encephalopathy, traumatic rhabdomyolysis, abrasions of multiple sites, dementia     PT now consulted for assessment of mobility and d/c needs  Pt with Up with assistance orders  Pts current co morbidities affecting treatment include: no PMH on file and personal factors including living alone   Pts current clinical presentation is Unstable/ Unpredictable (high complexity) due to Ongoing medical management for primary dx, Increased reliance on more restrictive AD compared to baseline, Decreased activity tolerance compared to baseline, Fall risk, Increased assistance needed from caregiver at current time, Cog status, Continuous pulse oximetry monitoring     Prior to admission, pt was I with ambulation with the use of a RW as per pt  Upon evaluation, pt currently is requiring Min Ax1 for bed mobility; Min Ax1 for transfers and Min Ax1 for ambulation w/ RW   Pt denies any lightheadedness or dizziness with ambulation  Pt presents at PT eval functioning below baseline and currently w/ overall mobility deficits 2* to: BLE weakness, impaired balance, decreased endurance, gait deviations, pain, decreased activity tolerance compared to baseline, decreased safety awareness, impaired judgement, fall risk, decreased skin integrity, decreased cognition  Pt currently at a fall risk 2* to impairments listed above  Based on the aforementioned PT evaluation, pt will continue to benefit from skilled Acute PT interventions to address stated impairments; to maximize functional mobility; for ongoing pt/ family training; and DME needs  At conclusion of PT session pt returned back in chair and chair alarm engaged with phone and call bell within reach  Pt denies any further questions at this time  PT is currently recommending Rehab  Pt/ family agreeable to plan and goals as stated on evaluation  PT will continue to follow during hospital stay  Barriers to Discharge Inaccessible home environment;Decreased caregiver support   Goals   Patient Goals "to go home"    STG Expiration Date 09/10/20   Short Term Goal #1 In 10 days pt will complete: 1) Bed mobility skills with S to increase safety and independence as well as decrease caregiver burden  2) Functional transfers with S to promote increased independence, safety, and QOL in the home environment  3) Ambulate 150' using least restrictive AD with S without LOB and stable vitals so that pt can negotiate home environment safely and promote independence with functional mobility and return to PLOF    4) Improve balance grades to Good to increase safety with all mobility and decrease fall risk  5) Improve BLE strength by 1/2 grade to help increase overall functional mobility and decrease fall risk  Plan   Treatment/Interventions Functional transfer training;LE strengthening/ROM; Therapeutic exercise; Endurance training;Patient/family training;Equipment eval/education; Bed mobility;Gait training;Spoke to nursing;OT   PT Frequency Other (Comment)  (3-6x a week )   Recommendation   PT Discharge Recommendation Post-Acute Rehabilitation Services   Equipment Recommended Walker   PT - OK to Discharge Yes  (to rehab when medically cleared )   Modified [place] Scale   Modified [place] Scale 4   Barthel Index   Feeding 10   Bathing 0   Grooming Score 5   Dressing Score 5   Bladder Score 10   Bowels Score 10   Toilet Use Score 5   Transfers (Bed/Chair) Score 10   Mobility (Level Surface) Score 0   Stairs Score 0   Barthel Index Score 55   Dionna Billy, PT

## 2020-09-01 LAB
BACTERIA BLD CULT: ABNORMAL
BACTERIA BLD CULT: ABNORMAL
GRAM STN SPEC: ABNORMAL
GRAM STN SPEC: ABNORMAL

## 2020-09-01 PROCEDURE — 87040 BLOOD CULTURE FOR BACTERIA: CPT | Performed by: PHYSICIAN ASSISTANT

## 2020-09-01 PROCEDURE — 99231 SBSQ HOSP IP/OBS SF/LOW 25: CPT | Performed by: SURGERY

## 2020-09-01 RX ORDER — CEFAZOLIN SODIUM 2 G/50ML
2000 SOLUTION INTRAVENOUS EVERY 8 HOURS
Status: COMPLETED | OUTPATIENT
Start: 2020-09-01 | End: 2020-09-03

## 2020-09-01 RX ADMIN — METRONIDAZOLE 500 MG: 500 INJECTION, SOLUTION INTRAVENOUS at 10:10

## 2020-09-01 RX ADMIN — CEFAZOLIN SODIUM 2000 MG: 2 SOLUTION INTRAVENOUS at 12:26

## 2020-09-01 RX ADMIN — LIDOCAINE 5% 1 PATCH: 700 PATCH TOPICAL at 08:33

## 2020-09-01 RX ADMIN — CHLORHEXIDINE GLUCONATE 0.12% ORAL RINSE 15 ML: 1.2 LIQUID ORAL at 21:38

## 2020-09-01 RX ADMIN — METRONIDAZOLE 500 MG: 500 INJECTION, SOLUTION INTRAVENOUS at 00:44

## 2020-09-01 RX ADMIN — CEFEPIME HYDROCHLORIDE 2000 MG: 2 INJECTION, POWDER, FOR SOLUTION INTRAVENOUS at 08:28

## 2020-09-01 RX ADMIN — CEFAZOLIN SODIUM 2000 MG: 2 SOLUTION INTRAVENOUS at 21:38

## 2020-09-01 RX ADMIN — HEPARIN SODIUM 5000 UNITS: 5000 INJECTION INTRAVENOUS; SUBCUTANEOUS at 21:38

## 2020-09-01 RX ADMIN — LEVETIRACETAM 500 MG: 500 TABLET, FILM COATED ORAL at 08:33

## 2020-09-01 RX ADMIN — HEPARIN SODIUM 5000 UNITS: 5000 INJECTION INTRAVENOUS; SUBCUTANEOUS at 05:01

## 2020-09-01 RX ADMIN — HEPARIN SODIUM 5000 UNITS: 5000 INJECTION INTRAVENOUS; SUBCUTANEOUS at 14:30

## 2020-09-01 RX ADMIN — CHLORHEXIDINE GLUCONATE 0.12% ORAL RINSE 15 ML: 1.2 LIQUID ORAL at 08:33

## 2020-09-01 RX ADMIN — LEVETIRACETAM 500 MG: 500 TABLET, FILM COATED ORAL at 21:38

## 2020-09-01 NOTE — ASSESSMENT & PLAN NOTE
- Traumatic rhabdomyolysis, present on admission, with associated NAVID  Suspect patient suffered a fall and/or multiple falls with possible downtime of unknown duration based on multiple wounds, some appearing to be pressure related, during admission evaluation   - Continue IV fluid hydration   - Trend urine output- Goal 1 5L x 24h  - Trend CPK Q8 until trending down ; at present downtrending

## 2020-09-01 NOTE — ASSESSMENT & PLAN NOTE
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 as well as suspected trace subarachnoid hemorrhage in the left parietal region on initial CT scan    - On repeat CT scan of head secondary to concerning pupillary exam change following transferred to [facility], there appears to be slight increase of the bilateral intraventricular hemorrhage, blossoming of the left parietal subarachnoid hemorrhage, bilateral subdural hematomas and suspected bleeding near the brainstem with formal/final radiologic read pending   - Neurosurgery following   - Neurologic exams Q4h  - Avoid all anti-platelet and anticoagulant medications     - PT and OT evaluation recommending inpatient rehab  - Continue Keppra   - f/u with neurosurgery in 2 weeks

## 2020-09-01 NOTE — ASSESSMENT & PLAN NOTE
- Acute kidney injury, present on admission  - Cr downtrending  - Monitor renal function and electrolytes  - Avoid nephrotoxic medications and hypotension

## 2020-09-01 NOTE — ASSESSMENT & PLAN NOTE
- Acute encephalopathy, likely multifactorial in setting of traumatic brain injury, multiple wounds with rhabdomyolysis and acute kidney injury, sepsis  - Urinalysis not suggestive of UTI  - Blood cultures growing 1/2 GNR, GPC  - Monitor temperature trend and trend leukocytosis with daily CBC   - Inpatient wound care consult for assistance with local wound care to multiple wound sites  Monitor for signs/symptoms of cellulitis or soft tissue infection   - Delirium precautions  - Geriatric Medicine consultation   - Review home medication list as well as past medical history when available  Patient unable to provide history at this time  No known contacts available for the patient at the time of admission and patient's primary care physician offices were closed on Fridays with no on call provider immediately available  - Treatment of known acute and underlying conditions as documented

## 2020-09-01 NOTE — PLAN OF CARE
Problem: Potential for Falls  Goal: Patient will remain free of falls  Description: INTERVENTIONS:  - Assess patient frequently for physical needs  -  Identify cognitive and physical deficits and behaviors that affect risk of falls    -  [place] fall precautions as indicated by assessment   - Educate patient/family on patient safety including physical limitations  - Instruct patient to call for assistance with activity based on assessment  - Modify environment to reduce risk of injury  - Consider OT/PT consult to assist with strengthening/mobility  Outcome: Progressing

## 2020-09-01 NOTE — PLAN OF CARE
Problem: Potential for Falls  Goal: Patient will remain free of falls  Description: INTERVENTIONS:  - Assess patient frequently for physical needs  -  Identify cognitive and physical deficits and behaviors that affect risk of falls    -  [place] fall precautions as indicated by assessment   - Educate patient/family on patient safety including physical limitations  - Instruct patient to call for assistance with activity based on assessment  - Modify environment to reduce risk of injury  - Consider OT/PT consult to assist with strengthening/mobility  Outcome: Progressing     Problem: PAIN - ADULT  Goal: Verbalizes/displays adequate comfort level or baseline comfort level  Description: Interventions:  - Encourage patient to monitor pain and request assistance  - Assess pain using appropriate pain scale  - Administer analgesics based on type and severity of pain and evaluate response  - Implement non-pharmacological measures as appropriate and evaluate response  - Consider cultural and social influences on pain and pain management  - Notify physician/advanced practitioner if interventions unsuccessful or patient reports new pain  Outcome: Progressing     Problem: INFECTION - ADULT  Goal: Absence or prevention of progression during hospitalization  Description: INTERVENTIONS:  - Assess and monitor for signs and symptoms of infection  - Monitor lab/diagnostic results  - Monitor all insertion sites, i e  indwelling lines, tubes, and drains  - Monitor endotracheal if appropriate and nasal secretions for changes in amount and color  - [place] appropriate cooling/warming therapies per order  - Administer medications as ordered  - Instruct and encourage patient and family to use good hand hygiene technique  - Identify and instruct in appropriate isolation precautions for identified infection/condition  Outcome: Progressing     Problem: SAFETY ADULT  Goal: Patient will remain free of falls  Description: INTERVENTIONS:  - Assess patient frequently for physical needs  -  Identify cognitive and physical deficits and behaviors that affect risk of falls    -  [place] fall precautions as indicated by assessment   - Educate patient/family on patient safety including physical limitations  - Instruct patient to call for assistance with activity based on assessment  - Modify environment to reduce risk of injury  - Consider OT/PT consult to assist with strengthening/mobility  Outcome: Progressing  Goal: Maintain or return to baseline ADL function  Description: INTERVENTIONS:  -  Assess patient's ability to carry out ADLs; assess patient's baseline for ADL function and identify physical deficits which impact ability to perform ADLs (bathing, care of mouth/teeth, toileting, grooming, dressing, etc )  - Assess/evaluate cause of self-care deficits   - Assess range of motion  - Assess patient's mobility; develop plan if impaired  - Assess patient's need for assistive devices and provide as appropriate  - Encourage maximum independence but intervene and supervise when necessary  - Involve family in performance of ADLs  - Assess for home care needs following discharge   - Consider OT consult to assist with ADL evaluation and planning for discharge  - Provide patient education as appropriate  Outcome: Progressing  Goal: Maintain or return mobility status to optimal level  Description: INTERVENTIONS:  - Assess patient's baseline mobility status (ambulation, transfers, stairs, etc )    - Identify cognitive and physical deficits and behaviors that affect mobility  - Identify mobility aids required to assist with transfers and/or ambulation (gait belt, sit-to-stand, lift, walker, cane, etc )  - [place] fall precautions as indicated by assessment  - Record patient progress and toleration of activity level on Mobility SBAR; progress patient to next Phase/Stage  - Instruct patient to call for assistance with activity based on assessment  - Consider rehabilitation consult to assist with strengthening/weightbearing, etc   Outcome: Progressing     Problem: DISCHARGE PLANNING  Goal: Discharge to home or other facility with appropriate resources  Description: INTERVENTIONS:  - Identify barriers to discharge w/patient and caregiver  - Arrange for needed discharge resources and transportation as appropriate  - Identify discharge learning needs (meds, wound care, etc )  - Arrange for interpretive services to assist at discharge as needed  - Refer to Case Management Department for coordinating discharge planning if the patient needs post-hospital services based on physician/advanced practitioner order or complex needs related to functional status, cognitive ability, or social support system  Outcome: Progressing     Problem: Knowledge Deficit  Goal: Patient/family/caregiver demonstrates understanding of disease process, treatment plan, medications, and discharge instructions  Description: Complete learning assessment and assess knowledge base    Interventions:  - Provide teaching at level of understanding  - Provide teaching via preferred learning methods  Outcome: Progressing     Problem: NEUROSENSORY - ADULT  Goal: Achieves stable or improved neurological status  Description: INTERVENTIONS  - Monitor and report changes in neurological status  - Monitor vital signs such as temperature, blood pressure, glucose, and any other labs ordered   - Initiate measures to prevent increased intracranial pressure  - Monitor for seizure activity and implement precautions if appropriate      Outcome: Progressing  Goal: Remains free of injury related to seizures activity  Description: INTERVENTIONS  - Maintain airway, patient safety  and administer oxygen as ordered  - Monitor patient for seizure activity, document and report duration and description of seizure to physician/advanced practitioner  - If seizure occurs,  ensure patient safety during seizure  - Reorient patient post seizure  - Seizure pads on all 4 side rails  - Instruct patient/family to notify RN of any seizure activity including if an aura is experienced  - Instruct patient/family to call for assistance with activity based on nursing assessment  - Administer anti-seizure medications if ordered    Outcome: Progressing  Goal: Achieves maximal functionality and self care  Description: INTERVENTIONS  - Monitor swallowing and airway patency with patient fatigue and changes in neurological status  - Encourage and assist patient to increase activity and self care     - Encourage visually impaired, hearing impaired and aphasic patients to use assistive/communication devices  Outcome: Progressing     Problem: SKIN/TISSUE INTEGRITY - ADULT  Goal: Skin integrity remains intact  Description: INTERVENTIONS  - Identify patients at risk for skin breakdown  - Assess and monitor skin integrity  - Assess and monitor nutrition and hydration status  - Monitor labs (i e  albumin)  - Assess for incontinence   - Turn and reposition patient  - Assist with mobility/ambulation  - Relieve pressure over bony prominences  - Avoid friction and shearing  - Provide appropriate hygiene as needed including keeping skin clean and dry  - Evaluate need for skin moisturizer/barrier cream  - Collaborate with interdisciplinary team (i e  Nutrition, Rehabilitation, etc )   - Patient/family teaching  Outcome: Progressing  Goal: Incision(s), wounds(s) or drain site(s) healing without S/S of infection  Description: INTERVENTIONS  - Assess and document risk factors for skin impairment   - Assess and document dressing, incision, wound bed, drain sites and surrounding tissue  - Consider nutrition services referral as needed  - Oral mucous membranes remain intact  - Provide patient/ family education  Outcome: Progressing  Goal: Oral mucous membranes remain intact  Description: INTERVENTIONS  - Assess oral mucosa and hygiene practices  - Implement preventative oral hygiene regimen  - Implement oral medicated treatments as ordered  - Initiate Nutrition services referral as needed  Outcome: Progressing     Problem: HEMATOLOGIC - ADULT  Goal: Maintains hematologic stability  Description: INTERVENTIONS  - Assess for signs and symptoms of bleeding or hemorrhage  - Monitor labs  - Administer supportive blood products/factors as ordered and appropriate  Outcome: Progressing     Problem: Prexisting or High Potential for Compromised Skin Integrity  Goal: Skin integrity is maintained or improved  Description: INTERVENTIONS:  - Identify patients at risk for skin breakdown  - Assess and monitor skin integrity  - Assess and monitor nutrition and hydration status  - Monitor labs   - Assess for incontinence   - Turn and reposition patient  - Assist with mobility/ambulation  - Relieve pressure over bony prominences  - Avoid friction and shearing  - Provide appropriate hygiene as needed including keeping skin clean and dry  - Evaluate need for skin moisturizer/barrier cream  - Collaborate with interdisciplinary team   - Patient/family teaching  - Consider wound care consult   Outcome: Progressing     Problem: SAFETY,RESTRAINT: NV/NON-SELF DESTRUCTIVE BEHAVIOR  Goal: Remains free of harm/injury (restraint for non violent/non self-detsructive behavior)  Description: INTERVENTIONS:  - Instruct patient/family regarding restraint use   - Assess and monitor physiologic and psychological status   - Provide interventions and comfort measures to meet assessed patient needs   - Identify and implement measures to help patient regain control  - Assess readiness for release of restraint   Outcome: Progressing  Goal: Returns to optimal restraint-free functioning  Description: INTERVENTIONS:  - Assess the patient's behavior and symptoms that indicate continued need for restraint  - Identify and implement measures to help patient regain control  - Assess readiness for release of restraint   Outcome: Progressing Problem: COPING  Goal: Pt/Family able to verbalize concerns and demonstrate effective coping strategies  Description: INTERVENTIONS:  - Assist patient/family to identify coping skills, available support systems and cultural and spiritual values  - Provide emotional support, including active listening and acknowledgement of concerns of patient and caregivers  - Reduce environmental stimuli, as able  - Provide patient education  - Assess for spiritual pain/suffering and initiate spiritual care, including notification of Pastoral Care or afshan based community as needed  - Assess effectiveness of coping strategies  Outcome: Progressing     Problem: DECISION MAKING  Goal: Pt/Family able to effectively weigh alternatives and participate in decision making related to treatment and care  Description: INTERVENTIONS:  - Identify decision maker  - Determine when there are differences among patient's view, family's view, and healthcare provider's view of patient condition and care goals  - Facilitate patient/family articulation of goals for care  - Help patient/family identify pros/cons of alternative solutions  - Provide information as requested by patient/family  - Respect patient/family rights related to privacy and sharing information   - Serve as a liaison between patient, family and health care team  - Initiate consults as appropriate (Ethics Team, Palliative Care, Family Care Conference, etc )  Outcome: Progressing     Problem: BEHAVIOR  Goal: Pt/Family maintain appropriate behavior and adhere to behavioral management agreement, if implemented  Description: INTERVENTIONS:  - Assess the family dynamic   - Encourage verbalization of thoughts and concerns in a socially appropriate manner  - Assess patient/family's coping skills and non-compliant behavior (including use of illegal substances)    - Utilize positive, consistent limit setting strategies supporting safety of patient, staff and others  - Initiate consult with Case Management, Spiritual Care or other ancillary services as appropriate  - If a patient's/visitor's behavior jeopardizes the safety of the patient, staff, or others, refer to organization procedure  - Notify Security of behavior or suspected illegal substances which indicate the need for search of the patient and/or belongings  - Encourage participation in the decision making process about a behavioral management agreement; implement if patient meets criteria  Outcome: Progressing     Problem: Nutrition/Hydration-ADULT  Goal: Nutrient/Hydration intake appropriate for improving, restoring or maintaining nutritional needs  Description: Monitor and assess patient's nutrition/hydration status for malnutrition  Collaborate with interdisciplinary team and initiate plan and interventions as ordered  Monitor patient's weight and dietary intake as ordered or per policy  Utilize nutrition screening tool and intervene as necessary  Determine patient's food preferences and provide high-protein, high-caloric foods as appropriate       INTERVENTIONS:  - Monitor oral intake, urinary output, labs, and treatment plans  - Assess nutrition and hydration status and recommend course of action  - Evaluate amount of meals eaten  - Assist patient with eating if necessary   - Allow adequate time for meals  - Recommend/ encourage appropriate diets, oral nutritional supplements, and vitamin/mineral supplements  - Order, calculate, and assess calorie counts as needed  - Recommend, monitor, and adjust tube feedings and TPN/PPN based on assessed needs  - Assess need for intravenous fluids  - Provide specific nutrition/hydration education as appropriate  - Include patient/family/caregiver in decisions related to nutrition  Outcome: Progressing

## 2020-09-01 NOTE — ASSESSMENT & PLAN NOTE
- Acute kidney injury, present on admission  - Cr downtrendin 6 today  - Monitor renal function and electrolytes  - Avoid nephrotoxic medications and hypotension

## 2020-09-01 NOTE — RESTORATIVE TECHNICIAN NOTE
Restorative Specialist Mobility Note       Activity: Ambulate in bey, Chair     Assistive Device: Front wheel walker

## 2020-09-01 NOTE — ASSESSMENT & PLAN NOTE
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 as well as suspected trace subarachnoid hemorrhage in the left parietal region on initial CT scan  - On repeat CT scan of head secondary to concerning pupillary exam change following transferred to [facility], there appears to be slight increase of the bilateral intraventricular hemorrhage, blossoming of the left parietal subarachnoid hemorrhage, bilateral subdural hematomas and suspected bleeding near the brainstem with formal/final radiologic read pending   - Neurosurgery following   - Neurologic exams Q4h  - Avoid all anti-platelet and anticoagulant medications  Only currently available medication list is from February of 2019 from the patient's primary care provider office with no listed anti-platelet and anticoagulant medications, and the patient's coag panel is within normal limits currently    - Repeat CT tomorrow AM   - PT and OT evaluation  - Continue Keppra

## 2020-09-01 NOTE — ASSESSMENT & PLAN NOTE
- Traumatic rhabdomyolysis, present on admission, with associated NAVID    Suspect patient suffered a fall and/or multiple falls with possible downtime of unknown duration based on multiple wounds, some appearing to be pressure related, during admission evaluation   - Continue IV fluid hydration, CPK down trending  - repeat BMP in AM

## 2020-09-01 NOTE — PROGRESS NOTES
Progress Note - Cynthia Wong 1945, 66 y o  male MRN: [id number]    Unit/Bed#: [facility] 603-01 Encounter: [id number]    Primary Care Provider: Cheo Jacob MD   Date and time admitted to hospital: 8/28/2020 12:43 PM        [address]  - Suspected unwitnessed fall with unknown loss of consciousness and the below noted injuries  - Fall precautions  - Geriatric Medicine consultation secondary to baseline dementia, acute encephalopathy, and ISAR >2   - PT and OT evaluation and treatment  - Case Management consultation for disposition planning/expected post acute care facility need  Encephalopathy acute  Assessment & Plan  - Acute encephalopathy, likely multifactorial in setting of traumatic brain injury, multiple wounds with rhabdomyolysis and acute kidney injury, sepsis  - Urinalysis not suggestive of UTI  - Blood cultures growing 1/2 GNR, GPC  - Monitor temperature trend and trend leukocytosis with daily CBC   - Inpatient wound care consult for assistance with local wound care to multiple wound sites  Monitor for signs/symptoms of cellulitis or soft tissue infection   - Delirium precautions  - Geriatric Medicine consultation   - Review home medication list as well as past medical history when available  Patient unable to provide history at this time  No known contacts available for the patient at the time of admission and patient's primary care physician offices were closed on Fridays with no on call provider immediately available  - Treatment of known acute and underlying conditions as documented  * TBI (traumatic brain injury) ([facility] [address] )  Assessment & Plan  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 as well as suspected trace subarachnoid hemorrhage in the left parietal region on initial CT scan    - On repeat CT scan of head secondary to concerning pupillary exam change following transferred to [facility], there appears to be slight increase of the bilateral intraventricular hemorrhage, blossoming of the left parietal subarachnoid hemorrhage, bilateral subdural hematomas and suspected bleeding near the brainstem with formal/final radiologic read pending   - Neurosurgery following   - Neurologic exams Q4h  - Avoid all anti-platelet and anticoagulant medications     - PT and OT evaluation recommending inpatient rehab  - Continue Keppra   - f/u with neurosurgery in 2 weeks    Subarachnoid hemorrhage ([facility] [address] )  Assessment & Plan  - please see above    Subdural hematoma ([facility] )  Assessment & Plan  - please see above    IVH (intraventricular hemorrhage) ([facility] )  Assessment & Plan  - please see above    NAVID (acute kidney injury) ([facility] )  Assessment & Plan  - Acute kidney injury, present on admission  - Cr downtrending  - Monitor renal function and electrolytes  - Avoid nephrotoxic medications and hypotension  Traumatic rhabdomyolysis [facility])  Assessment & Plan  - Traumatic rhabdomyolysis, present on admission, with associated NAVID  Suspect patient suffered a fall and/or multiple falls with possible downtime of unknown duration based on multiple wounds, some appearing to be pressure related, during admission evaluation   - Continue IV fluid hydration, CPK down trending  - repeat BMP in AM    Dementia [facility])  Assessment & Plan  - Chronic/baseline history of dementia  - Delirium precautions  - Geriatric Medicine following     Type 2 diabetes mellitus, without long-term current use of insulin [facility])  Assessment & Plan    Lab Results   Component Value Date    HGBA1C 5 7 (H) 08/29/2020     - Documented history of type 2 diabetes mellitus with questionable use of metformin per only available medication list from February 2018    - Currently without hyperglycemia on initial lab workup and most recent hemoglobin A1c from March 2020 was 5 7   - Will hold off on any medication therapy for diabetes hyperglycemia at this time and check blood sugars every 6 hours while NPO   - Not on ISS    Abrasions of multiple sites  Assessment & Plan  - Abrasions to multiple sites including all 4 extremities and his back  - Frequent repositioning, every 2 hours  - Wound nurse following     Pressure injury of back, stage 1  Assessment & Plan  - Suspected stage I pressure injury of the back  - Inpatient wound care consult for assistance with local wound care to multiple wound sites  Disposition: continue med-surg status, attempt to obtain hearing aides      SUBJECTIVE:  Chief Complaint: Patient unable to hear    Subjective: "I'm good"      OBJECTIVE:     Meds/Allergies     Current Facility-Administered Medications:     acetaminophen (TYLENOL) tablet 650 mg, 650 mg, Oral, Q6H PRN, Olman May PA-C    ceFAZolin (ANCEF) IVPB (premix) 2,000 mg 50 mL, 2,000 mg, Intravenous, Q8H, [facility], Last Rate: 100 mL/hr at 09/01/20 1226, 2,000 mg at 09/01/20 1226    chlorhexidine (PERIDEX) 0 12 % oral rinse 15 mL, 15 mL, Swish & Spit, Q12H [facility], Olman May PA-C, 15 mL at 09/01/20 8336    heparin (porcine) subcutaneous injection 5,000 Units, 5,000 Units, Subcutaneous, Q8H [facility], [facility], 5,000 Units at 09/01/20 1430    levETIRAcetam (KEPPRA) tablet 500 mg, 500 mg, Oral, Q12H [facility], Cory Koroma PA-C, 500 mg at 09/01/20 0833    lidocaine (LIDODERM) 5 % patch 1 patch, 1 patch, Topical, Daily, Olman May PA-C, 1 patch at 09/01/20 0833    ondansetron (ZOFRAN) injection 4 mg, 4 mg, Intravenous, Q6H PRN, Olman May PA-C     Vitals:   Vitals:    09/01/20 1543   BP: 161/91   Pulse:    Resp: 16   Temp:    SpO2:        Intake/Output:  I/O       08/30 0701 - 08/31 0700 08/31 0701 - 09/01 0700 09/01 0701 - 09/02 0700    P  O  420 860 720    I V  2250 950     IV Piggyback  400     Total Intake 2670 2210 720    Urine 500 1275 150    Stool  0 0    Total Output 500 1275 150    Net +2170 +935 +570           Unmeasured Urine Occurrence  8 x 1 x    Unmeasured Stool Occurrence  3 x 1 x           Nutrition/GI Proph/Bowel Reg: Regular    Physical Exam:   GENERAL APPEARANCE: NAD  NEURO: difficult to assess due to inability to have a meaningful conversation without his hearing aides  HEENT: NCAT  CV: RRR, no MGR  LUNGS: CTA bilaterally  GI: soft,non-tender,non -distended  : voiding  MSK: moving all equally  SKIN: pink, warm,dry    Invasive Devices     Peripheral Intravenous Line            Peripheral IV 08/28/20 Right Antecubital 4 days    Peripheral IV 09/01/20 Left Antecubital less than 1 day                 Lab Results: Results: I have personally reviewed pertinent reports   , BMP/CMP: No results found for: SODIUM, K, CL, CO2, ANIONGAP, BUN, CREATININE, GLUCOSE, CALCIUM, AST, ALT, ALKPHOS, PROT, BILITOT, EGFR and CBC: No results found for: WBC, HGB, HCT, MCV, PLT, ADJUSTEDWBC, MCH, MCHC, RDW, MPV, NRBC  Imaging/EKG Studies: Results: I have personally reviewed pertinent reports      Other Studies: no new  VTE Prophylaxis: Sequential compression device (Venodyne)  and Heparin

## 2020-09-01 NOTE — ASSESSMENT & PLAN NOTE
Lab Results   Component Value Date    HGBA1C 5 7 (H) 08/29/2020     - Documented history of type 2 diabetes mellitus with questionable use of metformin per only available medication list from February 2018    - Currently without hyperglycemia on initial lab workup and most recent hemoglobin A1c from March 2020 was 5 7   - Will hold off on any medication therapy for diabetes hyperglycemia at this time and check blood sugars every 6 hours while NPO   - Not on ISS

## 2020-09-01 NOTE — SOCIAL WORK
CM spoke to pt's cousin Angie Haque via telephone  She explains that CM should speak to the pt's friend Piotr Federico as he knows "the woman who has the POA paperwork"  CM left voicemail for Piotr Caballero   Pt is medically stable for d/c but CM will need to discuss SNF with pt's POA

## 2020-09-01 NOTE — PROGRESS NOTES
Progress Note - John Preston 1945, 66 y o  male MRN: [id number]    Unit/Bed#: [facility] 603-01 Encounter: [id number]    Primary Care Provider: Jon Wood MD   Date and time admitted to hospital: 2020 12:43 PM        Type 2 diabetes mellitus, without long-term current use of insulin [facility])  Assessment & Plan    Lab Results   Component Value Date    HGBA1C 5 7 (H) 2020     - Documented history of type 2 diabetes mellitus with questionable use of metformin per only available medication list from 2018  - Currently without hyperglycemia on initial lab workup and most recent hemoglobin A1c from 2020 was 5 7   - Will hold off on any medication therapy for diabetes hyperglycemia at this time and check blood sugars every 6 hours while NPO   - A1C pending   - Not on ISS    Pressure injury of back, stage 1  Assessment & Plan  - Suspected stage I pressure injury of the back  - Inpatient wound care consult for assistance with local wound care to multiple wound sites  Fall  Assessment & Plan  - Suspected unwitnessed fall with unknown loss of consciousness and the below noted injuries  - Fall precautions  - Geriatric Medicine consultation secondary to baseline dementia, acute encephalopathy, and ISAR >2   - PT and OT evaluation and treatment  - Case Management consultation for disposition planning/expected post acute care facility need  NAVID (acute kidney injury) ([facility] )  Assessment & Plan  - Acute kidney injury, present on admission  - Cr downtrendin 6 today  - Monitor renal function and electrolytes  - Avoid nephrotoxic medications and hypotension  Abrasions of multiple sites  Assessment & Plan  - Abrasions to multiple sites including all 4 extremities and his back  - Frequent repositioning, every 2 hours  - Wound nurse following     Traumatic rhabdomyolysis [facility])  Assessment & Plan  - Traumatic rhabdomyolysis, present on admission, with associated NAVID    Suspect patient suffered a fall and/or multiple falls with possible downtime of unknown duration based on multiple wounds, some appearing to be pressure related, during admission evaluation   - Continue IV fluid hydration   - Trend urine output- Goal 1 5L x 24h  - Trend CPK Q8 until trending down ; at present downtrending  Encephalopathy acute  Assessment & Plan  - Acute encephalopathy, likely multifactorial in setting of traumatic brain injury, multiple wounds with rhabdomyolysis and acute kidney injury, sepsis  - Urinalysis not suggestive of UTI; urine culture pending   - Blood cultures growing 1/2 GNR, GPC  - Monitor temperature trend and trend leukocytosis with daily CBC   - Inpatient wound care consult for assistance with local wound care to multiple wound sites  Monitor for signs/symptoms of cellulitis or soft tissue infection   - Delirium precautions  - Geriatric Medicine consultation   - Review home medication list as well as past medical history when available  Patient unable to provide history at this time  No known contacts available for the patient at the time of admission and patient's primary care physician offices were closed on Fridays with no on call provider immediately available  - Treatment of known acute and underlying conditions as documented  Dementia [facility])  Assessment & Plan  - Chronic/baseline history of dementia  - Delirium precautions  - Geriatric Medicine following     * TBI (traumatic brain injury) ([facility] [address] )  Assessment & Plan  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 as well as suspected trace subarachnoid hemorrhage in the left parietal region on initial CT scan    - On repeat CT scan of head secondary to concerning pupillary exam change following transferred to [facility], there appears to be slight increase of the bilateral intraventricular hemorrhage, blossoming of the left parietal subarachnoid hemorrhage, bilateral subdural hematomas and suspected bleeding near the brainstem with formal/final radiologic read pending   - Neurosurgery following   - Neurologic exams Q4h  - Avoid all anti-platelet and anticoagulant medications  Only currently available medication list is from February of 2019 from the patient's primary care provider office with no listed anti-platelet and anticoagulant medications, and the patient's coag panel is within normal limits currently  - Repeat CT tomorrow AM   - PT and OT evaluation  - Continue Keppra     Blood cultures: coagulase negative staph aureus  Disposition: pending resolution of active inpatient problems  SUBJECTIVE:  - no new complaints  - no acute overnight events        OBJECTIVE:     Meds/Allergies     Current Facility-Administered Medications:     acetaminophen (TYLENOL) tablet 650 mg, 650 mg, Oral, Q6H PRN, Pedrito Shafer PA-C    cefepime (MAXIPIME) 2,000 mg in dextrose 5 % 50 mL IVPB, 2,000 mg, Intravenous, Q12H, Pedrito Shafer PA-C, Last Rate: 100 mL/hr at 09/01/20 0828, 2,000 mg at 09/01/20 0828    chlorhexidine (PERIDEX) 0 12 % oral rinse 15 mL, 15 mL, Swish & Spit, Q12H [facility], Pedrito Shafer PA-C, 15 mL at 09/01/20 7126    heparin (porcine) subcutaneous injection 5,000 Units, 5,000 Units, Subcutaneous, Q8H [facility], eH Harvey PA-C, 5,000 Units at 09/01/20 0501    levETIRAcetam (KEPPRA) tablet 500 mg, 500 mg, Oral, Q12H [facility], Cory Koroma PA-C, 500 mg at 09/01/20 0833    lidocaine (LIDODERM) 5 % patch 1 patch, 1 patch, Topical, Daily, Pedrito Shafer PA-C, 1 patch at 09/01/20 0833    metroNIDAZOLE (FLAGYL) IVPB (premix) 500 mg 100 mL, 500 mg, Intravenous, Q8H, Cory Koroma PA-C, Last Rate: 200 mL/hr at 09/01/20 1010, 500 mg at 09/01/20 1010    ondansetron (ZOFRAN) injection 4 mg, 4 mg, Intravenous, Q6H PRN, Pedrito Shafer PA-C     Vitals:   Vitals:    09/01/20 0741   BP: 146/84   Pulse:    Resp:    Temp:    SpO2: Intake/Output:  I/O       08/30 0701 - 08/31 0700 08/31 0701 - 09/01 0700 09/01 0701 - 09/02 0700    P  O  420 860 240    I V  2250 950     IV Piggyback  400     Total Intake 2670 2210 240    Urine 500 1275     Stool  0     Total Output 500 1275     Net +2170 +935 +240           Unmeasured Urine Occurrence  8 x     Unmeasured Stool Occurrence  3 x            Nutrition/GI Proph/Bowel Reg: regular    Physical Exam:   GENERAL APPEARANCE: NAD  NEURO: alert, obeys commands  HEENT: abrasions noted, normocephalic  CV: RRR  LUNGS: CTA B/L  GI: soft, ND/NT                              MSK: moves all extremities  SKIN: warm and dry    Invasive Devices     Peripheral Intravenous Line            Peripheral IV 08/28/20 Right Antecubital 4 days    Peripheral IV 09/01/20 Left Antecubital less than 1 day                 Lab Results: Results: I have personally reviewed pertinent reports  Imaging/EKG Studies: Results: I have personally reviewed pertinent reports      Other Studies:   VTE Prophylaxis: Sequential compression device (Venodyne)  and Heparin

## 2020-09-02 ENCOUNTER — TELEPHONE (OUTPATIENT)
Dept: NEUROSURGERY | Facility: CLINIC | Age: 75
End: 2020-09-02

## 2020-09-02 PROBLEM — R78.81 BACTEREMIA: Status: ACTIVE | Noted: 2020-09-02

## 2020-09-02 PROBLEM — R79.89 BLOOD CULTURE POSITIVE FOR MICROORGANISM: Status: ACTIVE | Noted: 2020-09-02

## 2020-09-02 LAB
ANION GAP SERPL CALCULATED.3IONS-SCNC: 4 MMOL/L (ref 4–13)
BACTERIA BLD CULT: NORMAL
BASOPHILS # BLD AUTO: 0.06 THOUSANDS/ΜL (ref 0–0.1)
BASOPHILS NFR BLD AUTO: 0 % (ref 0–1)
BUN SERPL-MCNC: 10 MG/DL (ref 5–25)
CALCIUM SERPL-MCNC: 8 MG/DL (ref 8.3–10.1)
CHLORIDE SERPL-SCNC: 107 MMOL/L (ref 100–108)
CK MB SERPL-MCNC: 6.3 NG/ML (ref 0–5)
CK MB SERPL-MCNC: <1 % (ref 0–2.5)
CK SERPL-CCNC: 2445 U/L (ref 39–308)
CO2 SERPL-SCNC: 31 MMOL/L (ref 21–32)
CREAT SERPL-MCNC: 0.54 MG/DL (ref 0.6–1.3)
EOSINOPHIL # BLD AUTO: 0.17 THOUSAND/ΜL (ref 0–0.61)
EOSINOPHIL NFR BLD AUTO: 1 % (ref 0–6)
ERYTHROCYTE [DISTWIDTH] IN BLOOD BY AUTOMATED COUNT: 13.8 % (ref 11.6–15.1)
GFR SERPL CREATININE-BSD FRML MDRD: 101 ML/MIN/1.73SQ M
GLUCOSE SERPL-MCNC: 103 MG/DL (ref 65–140)
GLUCOSE SERPL-MCNC: 138 MG/DL (ref 65–140)
HCT VFR BLD AUTO: 32 % (ref 36.5–49.3)
HGB BLD-MCNC: 10.4 G/DL (ref 12–17)
IMM GRANULOCYTES # BLD AUTO: 0.28 THOUSAND/UL (ref 0–0.2)
IMM GRANULOCYTES NFR BLD AUTO: 2 % (ref 0–2)
LYMPHOCYTES # BLD AUTO: 1.74 THOUSANDS/ΜL (ref 0.6–4.47)
LYMPHOCYTES NFR BLD AUTO: 13 % (ref 14–44)
MAGNESIUM SERPL-MCNC: 2.1 MG/DL (ref 1.6–2.6)
MCH RBC QN AUTO: 30.4 PG (ref 26.8–34.3)
MCHC RBC AUTO-ENTMCNC: 32.5 G/DL (ref 31.4–37.4)
MCV RBC AUTO: 94 FL (ref 82–98)
MONOCYTES # BLD AUTO: 1.52 THOUSAND/ΜL (ref 0.17–1.22)
MONOCYTES NFR BLD AUTO: 11 % (ref 4–12)
NEUTROPHILS # BLD AUTO: 9.7 THOUSANDS/ΜL (ref 1.85–7.62)
NEUTS SEG NFR BLD AUTO: 73 % (ref 43–75)
NRBC BLD AUTO-RTO: 0 /100 WBCS
PHOSPHATE SERPL-MCNC: 2.8 MG/DL (ref 2.3–4.1)
PLATELET # BLD AUTO: 276 THOUSANDS/UL (ref 149–390)
PMV BLD AUTO: 9.8 FL (ref 8.9–12.7)
POTASSIUM SERPL-SCNC: 3.3 MMOL/L (ref 3.5–5.3)
RBC # BLD AUTO: 3.42 MILLION/UL (ref 3.88–5.62)
SODIUM SERPL-SCNC: 142 MMOL/L (ref 136–145)
WBC # BLD AUTO: 13.47 THOUSAND/UL (ref 4.31–10.16)

## 2020-09-02 PROCEDURE — 80048 BASIC METABOLIC PNL TOTAL CA: CPT | Performed by: PHYSICIAN ASSISTANT

## 2020-09-02 PROCEDURE — 99233 SBSQ HOSP IP/OBS HIGH 50: CPT | Performed by: INTERNAL MEDICINE

## 2020-09-02 PROCEDURE — 82550 ASSAY OF CK (CPK): CPT | Performed by: PHYSICIAN ASSISTANT

## 2020-09-02 PROCEDURE — 99232 SBSQ HOSP IP/OBS MODERATE 35: CPT | Performed by: SURGERY

## 2020-09-02 PROCEDURE — 82553 CREATINE MB FRACTION: CPT | Performed by: PHYSICIAN ASSISTANT

## 2020-09-02 PROCEDURE — 84100 ASSAY OF PHOSPHORUS: CPT | Performed by: PHYSICIAN ASSISTANT

## 2020-09-02 PROCEDURE — 83735 ASSAY OF MAGNESIUM: CPT | Performed by: PHYSICIAN ASSISTANT

## 2020-09-02 PROCEDURE — 82948 REAGENT STRIP/BLOOD GLUCOSE: CPT

## 2020-09-02 PROCEDURE — 85025 COMPLETE CBC W/AUTO DIFF WBC: CPT | Performed by: PHYSICIAN ASSISTANT

## 2020-09-02 RX ORDER — POTASSIUM CHLORIDE 20 MEQ/1
40 TABLET, EXTENDED RELEASE ORAL ONCE
Status: COMPLETED | OUTPATIENT
Start: 2020-09-02 | End: 2020-09-02

## 2020-09-02 RX ORDER — TRAVOPROST OPHTHALMIC SOLUTION 0.04 MG/ML
1 SOLUTION OPHTHALMIC
Status: DISCONTINUED | OUTPATIENT
Start: 2020-09-02 | End: 2020-09-15 | Stop reason: HOSPADM

## 2020-09-02 RX ORDER — BRIMONIDINE TARTRATE 0.15 %
1 DROPS OPHTHALMIC (EYE) EVERY 8 HOURS SCHEDULED
Status: DISCONTINUED | OUTPATIENT
Start: 2020-09-02 | End: 2020-09-15 | Stop reason: HOSPADM

## 2020-09-02 RX ORDER — ZIPRASIDONE HYDROCHLORIDE 40 MG/1
80 CAPSULE ORAL EVERY EVENING
Status: DISCONTINUED | OUTPATIENT
Start: 2020-09-02 | End: 2020-09-15 | Stop reason: HOSPADM

## 2020-09-02 RX ADMIN — CEFAZOLIN SODIUM 2000 MG: 2 SOLUTION INTRAVENOUS at 20:06

## 2020-09-02 RX ADMIN — ZIPRASIDONE HYDROCHLORIDE 80 MG: 40 CAPSULE ORAL at 19:07

## 2020-09-02 RX ADMIN — LEVETIRACETAM 500 MG: 500 TABLET, FILM COATED ORAL at 21:45

## 2020-09-02 RX ADMIN — POTASSIUM CHLORIDE 40 MEQ: 1500 TABLET, EXTENDED RELEASE ORAL at 10:06

## 2020-09-02 RX ADMIN — HEPARIN SODIUM 5000 UNITS: 5000 INJECTION INTRAVENOUS; SUBCUTANEOUS at 21:45

## 2020-09-02 RX ADMIN — CEFAZOLIN SODIUM 2000 MG: 2 SOLUTION INTRAVENOUS at 13:09

## 2020-09-02 RX ADMIN — CHLORHEXIDINE GLUCONATE 0.12% ORAL RINSE 15 ML: 1.2 LIQUID ORAL at 10:06

## 2020-09-02 RX ADMIN — TRAVOPROST 1 DROP: 0.04 SOLUTION/ DROPS OPHTHALMIC at 21:46

## 2020-09-02 RX ADMIN — CHLORHEXIDINE GLUCONATE 0.12% ORAL RINSE 15 ML: 1.2 LIQUID ORAL at 21:45

## 2020-09-02 RX ADMIN — HEPARIN SODIUM 5000 UNITS: 5000 INJECTION INTRAVENOUS; SUBCUTANEOUS at 13:09

## 2020-09-02 RX ADMIN — LIDOCAINE 5% 1 PATCH: 700 PATCH TOPICAL at 10:06

## 2020-09-02 RX ADMIN — HEPARIN SODIUM 5000 UNITS: 5000 INJECTION INTRAVENOUS; SUBCUTANEOUS at 04:58

## 2020-09-02 RX ADMIN — CEFAZOLIN SODIUM 2000 MG: 2 SOLUTION INTRAVENOUS at 04:57

## 2020-09-02 RX ADMIN — LEVETIRACETAM 500 MG: 500 TABLET, FILM COATED ORAL at 10:06

## 2020-09-02 RX ADMIN — BRIMONIDINE TARTRATE 1 DROP: 1.5 SOLUTION OPHTHALMIC at 19:07

## 2020-09-02 NOTE — ASSESSMENT & PLAN NOTE
- Traumatic rhabdomyolysis, present on admission, with associated NAVID    Suspect patient suffered a fall and/or multiple falls with possible downtime of unknown duration based on multiple wounds, some appearing to be pressure related, during admission evaluation   - Continue IV fluid hydration, CPK down trending  - repeat BMP stable

## 2020-09-02 NOTE — ASSESSMENT & PLAN NOTE
- Acute kidney injury, present on admission now resolved  - Monitor renal function and electrolytes  - Avoid nephrotoxic medications and hypotension

## 2020-09-02 NOTE — PROGRESS NOTES
Progress Note - Lashawn Velasco 1945, 66 y o  male MRN: [id number]    Unit/Bed#: [facility] 603-01 Encounter: [id number]    Primary Care Provider: Melvin Velásquez MD   Date and time admitted to hospital: 8/28/2020 12:43 PM        Blood culture positive for microorganism  Assessment & Plan  - 1 of 2 blood culture from 8/28 positive for S  Aureus coagulase negative, Pantoea agglomerans  - started on ancef q8, repeat cultures 9/1 pending  - afebrile overnight, mild leukocytosis this AM (13 5)        IVH (intraventricular hemorrhage) ([facility] [address] )  Assessment & Plan  - please see above    Subdural hematoma ([facility] )  Assessment & Plan  - please see above    Subarachnoid hemorrhage ([facility] )  Assessment & Plan  - please see above    Type 2 diabetes mellitus, without long-term current use of insulin [facility])  Assessment & Plan    Lab Results   Component Value Date    HGBA1C 5 7 (H) 08/29/2020     - Documented history of type 2 diabetes mellitus with questionable use of metformin per only available medication list from February 2018  - Currently without hyperglycemia on initial lab workup and most recent hemoglobin A1c from March 2020 was 5 7  - Not on ISS, continue to monitor via routine labwork    Pressure injury of back, stage 1  Assessment & Plan  - Suspected stage I pressure injury of the back  - Inpatient wound care consult for assistance with local wound care to multiple wound sites  Fall  Assessment & Plan  - Suspected unwitnessed fall with unknown loss of consciousness and the below noted injuries  - Fall precautions  - Geriatric Medicine consultation secondary to baseline dementia, acute encephalopathy, and ISAR >2   - PT and OT evaluation and treatment  - Case Management consultation for disposition planning/expected post acute care facility need      NAVID (acute kidney injury) ([facility] [address] )  Assessment & Plan  - Acute kidney injury, present on admission now resolved  - Monitor renal function and electrolytes  - Avoid nephrotoxic medications and hypotension    Abrasions of multiple sites  Assessment & Plan  - Abrasions to multiple sites including all 4 extremities and his back  - Frequent repositioning, every 2 hours  - Wound nurse following     Traumatic rhabdomyolysis [facility])  Assessment & Plan  - Traumatic rhabdomyolysis, present on admission, with associated NAVID  Suspect patient suffered a fall and/or multiple falls with possible downtime of unknown duration based on multiple wounds, some appearing to be pressure related, during admission evaluation   - Continue IV fluid hydration, CPK down trending  - repeat BMP stable    Encephalopathy acute  Assessment & Plan  - Acute encephalopathy, likely multifactorial in setting of traumatic brain injury, multiple wounds with rhabdomyolysis and acute kidney injury, sepsis  - Blood cultures growing 1/2 GNR, GPC; started on ancef  - Inpatient wound care consult for assistance with local wound care to multiple wound sites  Monitor for signs/symptoms of cellulitis or soft tissue infection   - Delirium precautions  - Review home medication list as well as past medical history when available  Patient unable to provide history at this time  No known contacts available for the patient at the time of admission and patient's primary care physician offices were closed on Fridays with no on call provider immediately available  - required posey belt last two nights, consider melatonin or other agents to help regulate sleep/wake cycle    Dementia ([facility])  Assessment & Plan  - Chronic/baseline history of dementia  - Delirium precautions  - Geriatric Medicine following     * TBI (traumatic brain injury) ([facility] [address] )  Assessment & Plan  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 as well as suspected trace subarachnoid hemorrhage in the left parietal region on initial CT scan      - On repeat CT scan of head secondary to concerning pupillary exam change following transferred to [facility], there appears to be slight increase of the bilateral intraventricular hemorrhage, blossoming of the left parietal subarachnoid hemorrhage, bilateral subdural hematomas and suspected bleeding near the brainstem  - repeat head CT 8/30 with improvement and stable hemorrhages  - PT and OT evaluation recommending inpatient rehab  - Continue Keppra   - f/u with neurosurgery in 2 weeks, hold AC/AP until then although okay for VTE ppx; neurosurgery signed off        Disposition: inpatient      SUBJECTIVE:  Chief Complaint: wants water    Subjective: Wants water  Needed posey belt overnight for attempting to get out of bed, would yell at people passing by hallway  OBJECTIVE:     Meds/Allergies     Current Facility-Administered Medications:     acetaminophen (TYLENOL) tablet 650 mg, 650 mg, Oral, Q6H PRN, Pedrito Shafer PA-C    ceFAZolin (ANCEF) IVPB (premix) 2,000 mg 50 mL, 2,000 mg, Intravenous, Q8H, He Harvey PA-C, Stopped at 09/02/20 0559    chlorhexidine (PERIDEX) 0 12 % oral rinse 15 mL, 15 mL, Swish & Spit, Q12H [facility], Pedrito Shafer PA-C, 15 mL at 09/01/20 2138    heparin (porcine) subcutaneous injection 5,000 Units, 5,000 Units, Subcutaneous, Q8H [facility], He Harvey PA-C, 5,000 Units at 09/02/20 0458    levETIRAcetam (KEPPRA) tablet 500 mg, 500 mg, Oral, Q12H [facility], Pedrito Shafer PA-C, 500 mg at 09/01/20 2138    lidocaine (LIDODERM) 5 % patch 1 patch, 1 patch, Topical, Daily, Pedrito Shafer PA-C, 1 patch at 09/01/20 0833    ondansetron (ZOFRAN) injection 4 mg, 4 mg, Intravenous, Q6H PRN, Pedrito Shafer PA-C     Vitals:   Vitals:    09/01/20 2314   BP: 147/87   Pulse: (!) 108   Resp: 16   Temp: 98 °F (36 7 °C)   SpO2: 95%       Intake/Output:  I/O       08/31 0701 - 09/01 0700 09/01 0701 - 09/02 0700 09/02 0701 - 09/03 0700    P  O  860 810     I V  (mL/kg) 950      IV Piggyback 400 100     Total Intake(mL/kg) 2210 910 (11 6)     Urine (mL/kg/hr) 1275 731 (0 4)     Stool 0 0     Total Output 1275 731     Net +935 +179            Unmeasured Urine Occurrence 8 x 4 x     Unmeasured Stool Occurrence 3 x 1 x            Nutrition/GI Proph/Bowel Reg: regular house    Physical Exam:   Physical Exam  Vitals signs reviewed  Constitutional:       Appearance: Normal appearance  He is not toxic-appearing or diaphoretic  HENT:      Head: Normocephalic and atraumatic  Eyes:      General: No scleral icterus  Right eye: No discharge  Left eye: No discharge  Cardiovascular:      Rate and Rhythm: Normal rate and regular rhythm  Pulses: Normal pulses  Pulmonary:      Effort: Pulmonary effort is normal  No respiratory distress  Breath sounds: Normal breath sounds  Abdominal:      General: There is no distension  Palpations: Abdomen is soft  Tenderness: There is no abdominal tenderness  There is no guarding or rebound  Musculoskeletal:         General: No signs of injury  Right lower leg: No edema  Left lower leg: No edema  Skin:     General: Skin is warm  Coloration: Skin is not jaundiced or pale  Neurological:      General: No focal deficit present  Mental Status: He is alert        Comments: Moving all extremities, no focal deficits         Invasive Devices     Peripheral Intravenous Line            Peripheral IV 09/01/20 Left Antecubital 1 day                 Lab Results:   BMP/CMP:   Lab Results   Component Value Date    SODIUM 142 09/02/2020    K 3 3 (L) 09/02/2020     09/02/2020    CO2 31 09/02/2020    BUN 10 09/02/2020    CREATININE 0 54 (L) 09/02/2020    CALCIUM 8 0 (L) 09/02/2020    EGFR 101 09/02/2020    and CBC:   Lab Results   Component Value Date    WBC 13 47 (H) 09/02/2020    HGB 10 4 (L) 09/02/2020    HCT 32 0 (L) 09/02/2020    MCV 94 09/02/2020     09/02/2020    MCH 30 4 09/02/2020    MCHC 32 5 09/02/2020    RDW 13 8 09/02/2020    MPV 9 8 09/02/2020 NRBC 0 09/02/2020     Imaging/EKG Studies: Results: I have personally reviewed pertinent reports      Other Studies: none  VTE Prophylaxis: Heparin

## 2020-09-02 NOTE — ASSESSMENT & PLAN NOTE
Lab Results   Component Value Date    HGBA1C 5 7 (H) 08/29/2020     - Documented history of type 2 diabetes mellitus with questionable use of metformin per only available medication list from February 2018    - Currently without hyperglycemia on initial lab workup and most recent hemoglobin A1c from March 2020 was 5 7  - Not on ISS, continue to monitor via routine labwork

## 2020-09-02 NOTE — PLAN OF CARE
Problem: Potential for Falls  Goal: Patient will remain free of falls  Description: INTERVENTIONS:  - Assess patient frequently for physical needs  -  Identify cognitive and physical deficits and behaviors that affect risk of falls    -  [place] fall precautions as indicated by assessment   - Educate patient/family on patient safety including physical limitations  - Instruct patient to call for assistance with activity based on assessment  - Modify environment to reduce risk of injury  - Consider OT/PT consult to assist with strengthening/mobility  Outcome: Progressing     Problem: PAIN - ADULT  Goal: Verbalizes/displays adequate comfort level or baseline comfort level  Description: Interventions:  - Encourage patient to monitor pain and request assistance  - Assess pain using appropriate pain scale  - Administer analgesics based on type and severity of pain and evaluate response  - Implement non-pharmacological measures as appropriate and evaluate response  - Consider cultural and social influences on pain and pain management  - Notify physician/advanced practitioner if interventions unsuccessful or patient reports new pain  Outcome: Progressing     Problem: INFECTION - ADULT  Goal: Absence or prevention of progression during hospitalization  Description: INTERVENTIONS:  - Assess and monitor for signs and symptoms of infection  - Monitor lab/diagnostic results  - Monitor all insertion sites, i e  indwelling lines, tubes, and drains  - Monitor endotracheal if appropriate and nasal secretions for changes in amount and color  - [place] appropriate cooling/warming therapies per order  - Administer medications as ordered  - Instruct and encourage patient and family to use good hand hygiene technique  - Identify and instruct in appropriate isolation precautions for identified infection/condition  Outcome: Progressing     Problem: SAFETY ADULT  Goal: Patient will remain free of falls  Description: INTERVENTIONS:  - Assess patient frequently for physical needs  -  Identify cognitive and physical deficits and behaviors that affect risk of falls    -  [place] fall precautions as indicated by assessment   - Educate patient/family on patient safety including physical limitations  - Instruct patient to call for assistance with activity based on assessment  - Modify environment to reduce risk of injury  - Consider OT/PT consult to assist with strengthening/mobility  Outcome: Progressing  Goal: Maintain or return to baseline ADL function  Description: INTERVENTIONS:  -  Assess patient's ability to carry out ADLs; assess patient's baseline for ADL function and identify physical deficits which impact ability to perform ADLs (bathing, care of mouth/teeth, toileting, grooming, dressing, etc )  - Assess/evaluate cause of self-care deficits   - Assess range of motion  - Assess patient's mobility; develop plan if impaired  - Assess patient's need for assistive devices and provide as appropriate  - Encourage maximum independence but intervene and supervise when necessary  - Involve family in performance of ADLs  - Assess for home care needs following discharge   - Consider OT consult to assist with ADL evaluation and planning for discharge  - Provide patient education as appropriate  Outcome: Progressing  Goal: Maintain or return mobility status to optimal level  Description: INTERVENTIONS:  - Assess patient's baseline mobility status (ambulation, transfers, stairs, etc )    - Identify cognitive and physical deficits and behaviors that affect mobility  - Identify mobility aids required to assist with transfers and/or ambulation (gait belt, sit-to-stand, lift, walker, cane, etc )  - [place] fall precautions as indicated by assessment  - Record patient progress and toleration of activity level on Mobility SBAR; progress patient to next Phase/Stage  - Instruct patient to call for assistance with activity based on assessment  - Consider rehabilitation consult to assist with strengthening/weightbearing, etc   Outcome: Progressing     Problem: DISCHARGE PLANNING  Goal: Discharge to home or other facility with appropriate resources  Description: INTERVENTIONS:  - Identify barriers to discharge w/patient and caregiver  - Arrange for needed discharge resources and transportation as appropriate  - Identify discharge learning needs (meds, wound care, etc )  - Arrange for interpretive services to assist at discharge as needed  - Refer to Case Management Department for coordinating discharge planning if the patient needs post-hospital services based on physician/advanced practitioner order or complex needs related to functional status, cognitive ability, or social support system  Outcome: Progressing     Problem: Knowledge Deficit  Goal: Patient/family/caregiver demonstrates understanding of disease process, treatment plan, medications, and discharge instructions  Description: Complete learning assessment and assess knowledge base    Interventions:  - Provide teaching at level of understanding  - Provide teaching via preferred learning methods  Outcome: Progressing     Problem: NEUROSENSORY - ADULT  Goal: Achieves stable or improved neurological status  Description: INTERVENTIONS  - Monitor and report changes in neurological status  - Monitor vital signs such as temperature, blood pressure, glucose, and any other labs ordered   - Initiate measures to prevent increased intracranial pressure  - Monitor for seizure activity and implement precautions if appropriate      Outcome: Progressing  Goal: Remains free of injury related to seizures activity  Description: INTERVENTIONS  - Maintain airway, patient safety  and administer oxygen as ordered  - Monitor patient for seizure activity, document and report duration and description of seizure to physician/advanced practitioner  - If seizure occurs,  ensure patient safety during seizure  - Reorient patient post seizure  - Seizure pads on all 4 side rails  - Instruct patient/family to notify RN of any seizure activity including if an aura is experienced  - Instruct patient/family to call for assistance with activity based on nursing assessment  - Administer anti-seizure medications if ordered    Outcome: Progressing  Goal: Achieves maximal functionality and self care  Description: INTERVENTIONS  - Monitor swallowing and airway patency with patient fatigue and changes in neurological status  - Encourage and assist patient to increase activity and self care     - Encourage visually impaired, hearing impaired and aphasic patients to use assistive/communication devices  Outcome: Progressing     Problem: SKIN/TISSUE INTEGRITY - ADULT  Goal: Skin integrity remains intact  Description: INTERVENTIONS  - Identify patients at risk for skin breakdown  - Assess and monitor skin integrity  - Assess and monitor nutrition and hydration status  - Monitor labs (i e  albumin)  - Assess for incontinence   - Turn and reposition patient  - Assist with mobility/ambulation  - Relieve pressure over bony prominences  - Avoid friction and shearing  - Provide appropriate hygiene as needed including keeping skin clean and dry  - Evaluate need for skin moisturizer/barrier cream  - Collaborate with interdisciplinary team (i e  Nutrition, Rehabilitation, etc )   - Patient/family teaching  Outcome: Progressing  Goal: Incision(s), wounds(s) or drain site(s) healing without S/S of infection  Description: INTERVENTIONS  - Assess and document risk factors for skin impairment   - Assess and document dressing, incision, wound bed, drain sites and surrounding tissue  - Consider nutrition services referral as needed  - Oral mucous membranes remain intact  - Provide patient/ family education  Outcome: Progressing  Goal: Oral mucous membranes remain intact  Description: INTERVENTIONS  - Assess oral mucosa and hygiene practices  - Implement preventative oral hygiene regimen  - Implement oral medicated treatments as ordered  - Initiate Nutrition services referral as needed  Outcome: Progressing     Problem: HEMATOLOGIC - ADULT  Goal: Maintains hematologic stability  Description: INTERVENTIONS  - Assess for signs and symptoms of bleeding or hemorrhage  - Monitor labs  - Administer supportive blood products/factors as ordered and appropriate  Outcome: Progressing     Problem: Prexisting or High Potential for Compromised Skin Integrity  Goal: Skin integrity is maintained or improved  Description: INTERVENTIONS:  - Identify patients at risk for skin breakdown  - Assess and monitor skin integrity  - Assess and monitor nutrition and hydration status  - Monitor labs   - Assess for incontinence   - Turn and reposition patient  - Assist with mobility/ambulation  - Relieve pressure over bony prominences  - Avoid friction and shearing  - Provide appropriate hygiene as needed including keeping skin clean and dry  - Evaluate need for skin moisturizer/barrier cream  - Collaborate with interdisciplinary team   - Patient/family teaching  - Consider wound care consult   Outcome: Progressing     Problem: SAFETY,RESTRAINT: NV/NON-SELF DESTRUCTIVE BEHAVIOR  Goal: Remains free of harm/injury (restraint for non violent/non self-detsructive behavior)  Description: INTERVENTIONS:  - Instruct patient/family regarding restraint use   - Assess and monitor physiologic and psychological status   - Provide interventions and comfort measures to meet assessed patient needs   - Identify and implement measures to help patient regain control  - Assess readiness for release of restraint   Outcome: Progressing  Goal: Returns to optimal restraint-free functioning  Description: INTERVENTIONS:  - Assess the patient's behavior and symptoms that indicate continued need for restraint  - Identify and implement measures to help patient regain control  - Assess readiness for release of restraint   Outcome: Progressing Problem: COPING  Goal: Pt/Family able to verbalize concerns and demonstrate effective coping strategies  Description: INTERVENTIONS:  - Assist patient/family to identify coping skills, available support systems and cultural and spiritual values  - Provide emotional support, including active listening and acknowledgement of concerns of patient and caregivers  - Reduce environmental stimuli, as able  - Provide patient education  - Assess for spiritual pain/suffering and initiate spiritual care, including notification of Pastoral Care or afshan based community as needed  - Assess effectiveness of coping strategies  Outcome: Progressing     Problem: DECISION MAKING  Goal: Pt/Family able to effectively weigh alternatives and participate in decision making related to treatment and care  Description: INTERVENTIONS:  - Identify decision maker  - Determine when there are differences among patient's view, family's view, and healthcare provider's view of patient condition and care goals  - Facilitate patient/family articulation of goals for care  - Help patient/family identify pros/cons of alternative solutions  - Provide information as requested by patient/family  - Respect patient/family rights related to privacy and sharing information   - Serve as a liaison between patient, family and health care team  - Initiate consults as appropriate (Ethics Team, Palliative Care, Family Care Conference, etc )  Outcome: Progressing     Problem: BEHAVIOR  Goal: Pt/Family maintain appropriate behavior and adhere to behavioral management agreement, if implemented  Description: INTERVENTIONS:  - Assess the family dynamic   - Encourage verbalization of thoughts and concerns in a socially appropriate manner  - Assess patient/family's coping skills and non-compliant behavior (including use of illegal substances)    - Utilize positive, consistent limit setting strategies supporting safety of patient, staff and others  - Initiate consult with Case Management, Spiritual Care or other ancillary services as appropriate  - If a patient's/visitor's behavior jeopardizes the safety of the patient, staff, or others, refer to organization procedure  - Notify Security of behavior or suspected illegal substances which indicate the need for search of the patient and/or belongings  - Encourage participation in the decision making process about a behavioral management agreement; implement if patient meets criteria  Outcome: Progressing     Problem: Nutrition/Hydration-ADULT  Goal: Nutrient/Hydration intake appropriate for improving, restoring or maintaining nutritional needs  Description: Monitor and assess patient's nutrition/hydration status for malnutrition  Collaborate with interdisciplinary team and initiate plan and interventions as ordered  Monitor patient's weight and dietary intake as ordered or per policy  Utilize nutrition screening tool and intervene as necessary  Determine patient's food preferences and provide high-protein, high-caloric foods as appropriate       INTERVENTIONS:  - Monitor oral intake, urinary output, labs, and treatment plans  - Assess nutrition and hydration status and recommend course of action  - Evaluate amount of meals eaten  - Assist patient with eating if necessary   - Allow adequate time for meals  - Recommend/ encourage appropriate diets, oral nutritional supplements, and vitamin/mineral supplements  - Order, calculate, and assess calorie counts as needed  - Recommend, monitor, and adjust tube feedings and TPN/PPN based on assessed needs  - Assess need for intravenous fluids  - Provide specific nutrition/hydration education as appropriate  - Include patient/family/caregiver in decisions related to nutrition  Outcome: Progressing

## 2020-09-02 NOTE — ASSESSMENT & PLAN NOTE
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 as well as suspected trace subarachnoid hemorrhage in the left parietal region on initial CT scan      - On repeat CT scan of head secondary to concerning pupillary exam change following transferred to [facility], there appears to be slight increase of the bilateral intraventricular hemorrhage, blossoming of the left parietal subarachnoid hemorrhage, bilateral subdural hematomas and suspected bleeding near the brainstem  - repeat head CT 8/30 with improvement and stable hemorrhages  - PT and OT evaluation recommending inpatient rehab  - Continue Keppra   - f/u with neurosurgery in 2 weeks, hold AC/AP until then although okay for VTE ppx; neurosurgery signed off

## 2020-09-02 NOTE — ASSESSMENT & PLAN NOTE
- 1 of 2 blood culture from 8/28 positive for S   Aureus coagulase negative, Pantoea agglomerans  - started on ancef q8, repeat cultures 9/1 pending  - afebrile overnight, mild leukocytosis this AM (13 5)

## 2020-09-02 NOTE — TELEPHONE ENCOUNTER
09/04/2020-PT Martha 23    09/03/2020-PT STILL IN HOSPITAL    09/02/2020-PT STILL IN HOSPITAL  09/08/2020 APT IN [facility] W/CT HEAD      ----- Message from [place], [place] sent at 8/30/2020  7:05 AM EDT -----  Regarding: Hospital follow-up  Subdural hematoma/subarachnoid hemorrhage  Follow-up with PA 1-2 weeks with repeat CT head without  Thank you

## 2020-09-02 NOTE — PROGRESS NOTES
Progress Note - Geriatric Medicine   Jesus Venegas  66 y o  male MRN: [id number]  Unit/Bed#: [facility] 603-01 Encounter: [id number]      Assessment/Plan:    1  Subdural Hematoma  - currently under the care of trauma team and neurosurgery  - Continue neurochecks frequently  - Continue appropriate blood pressure control  - Continue Keppra for seizure prophylaxis  - Continue PT/OT  - repeat CT head without contrast on 9/4/20    2  Subarachnoid hemorrhage  - Currently under the care of trauma team and neurosurgery  - continue neurochecks  - fall risk precaution    3  Traumatic brain Injury  - Present on Admission    - Initial CT scan showed small intraventricular hemorrhage in bilateral occipital horns as well as trace subarachnoid hemorrhage in the left parietal region  Repeat CT on admission shows slight increase of the bilateraly intraventricular hemorrhage, bilateral subdural hemorrhage   - Neurosurgery following  - Continue Neuro checks  - increased risk for delirium  - Continue PT/OT   - Continue Keppra    4  Suspected Fall  - history of patient being found on the floor by the visiting nurse  - high risk for repeat falls  - fall precautions in place  - encourage Ambulation  - PT/OT  - continue Lidoderm  - Pain control with Tyelnol 650mg q6hrs prn    5  Metabolic encephalopathy  - unsure of baseline, but improving from admission  - Blood cultures growing: Coagulase negative S Aureus  - Currently on Ancef q8hrs  - Repeat cultures pending  - repeat CBC tomorrow    6  Traumatic Rhabdomyolysis  - present on admission, currently improving  - Continue fluid hydration and CK monitoring  - CK today: 2445    7  Suspected cognitive impairment at baseline  - despite multiple attempts from team to discuss with PCP, unable to obtain reach them  - Continue to monitor  - Delirium precautions    8   Hearing Impairment  - difficulty hearing, attempt to use hearing amplifier  - will need to follow up outpatient for hearing aids  - high risk for falls  - Use white board or hearing amplifier     9  Multiple abrasions  - improving, present on admission possibly from fall  - continue local wound care per trauma team  - Wound care team following    10  Delirium Precautions  - high risk for delirium considering age, TBI, subdural hematoma and underlying condition  - orient patient using consistent staffing and appropriate lighting  - Avoiding overstimulation  - Encourage regular routine and proper sleep-week cycle  - Avoid restraints and delirium inducing medications  - if agitated or aggressive, encourage frequent reorientation   - ensure fluid and electrolyte balance and effective pain management  - If needed and all other conservative management has failed can try posey belts  reduce overnight interruptions, group overnight vitals/labs/nursing checks  - Maintain normal sleep/wake cycle by reducing dim lights, close blinds to help reduce distractions  - monitor intakes and outputs    11  Pressure injury of back, stage 1  - continue local wound care as per trauma     12  Suspected Bipolar disorder  - at home on Geodon 60mg in the morning and 80 mg at night as per pharmacy  - unable to verify with PCP or patient about his psychiatry conditions   - consider starting at a low dose and slowly titrating it up    Subjective: This is a 65 y/o male with unknown past medical history who was seen for geriatrics follow up  He is currently being hospitalized for subdural and subarachnoid hemorrhage  He was seen and evaluated at bedside in collaboration with nursing  He is heard of hearing and uses hearing aids at home  Although sound amplifier was used, he could still not hear  He denies any pain  Reports eating well and sleeping well  As per nursing, he had become increasingly agitated for the past two nights requiring posey belts  He is able to be reoriented and redirected at times  Posey belts are off currently       Review of Systems Constitutional: Negative for fever  HENT: Negative for congestion and sore throat  Respiratory: Negative for cough, chest tightness and shortness of breath  Cardiovascular: Negative for chest pain, palpitations and leg swelling  Gastrointestinal: Negative for abdominal distention, abdominal pain, constipation, diarrhea, nausea and vomiting  Genitourinary: Negative for dysuria and frequency  Musculoskeletal: Negative for back pain and myalgias  Skin: Positive for rash and wound  Neurological: Negative for dizziness, weakness, numbness and headaches  Psychiatric/Behavioral: Negative for sleep disturbance  Home Medications: [facility]  Geodon 60mg in AM and 80mg HS  Lipitor 80mg once daily  Metformin 1000mg bid  Travatan-z eydrops 1 to each eye in the morning  Alphagan P 0 15% to both eyes BID  Objective:     Vitals: Blood pressure 146/86, pulse 85, temperature 98 2 °F (36 8 °C), resp  rate 17, height 5' 7" (1 702 m), weight 78 4 kg (172 lb 14 4 oz), SpO2 99 %  ,Body mass index is 27 08 kg/m²  Intake/Output Summary (Last 24 hours) at 9/2/2020 1314  Last data filed at 9/2/2020 1041  Gross per 24 hour   Intake 430 ml   Output 956 ml   Net -526 ml       Current Medications: Reviewed    Physical Exam:   Physical Exam  Constitutional:       Comments: dishelved   HENT:      Head: Normocephalic and atraumatic  Nose: Nose normal       Mouth/Throat:      Mouth: Mucous membranes are moist    Eyes:      Extraocular Movements: Extraocular movements intact  Pupils: Pupils are equal, round, and reactive to light  Neck:      Musculoskeletal: Neck supple  Cardiovascular:      Rate and Rhythm: Normal rate and regular rhythm  Pulses: Normal pulses  Pulmonary:      Effort: Pulmonary effort is normal       Breath sounds: Normal breath sounds  Abdominal:      General: Abdomen is flat  Bowel sounds are normal  There is no distension     Musculoskeletal:      Comments: Multiple abrasions and bruising noted on bilateral lower extremities and upper lower extremity  Neurological:      Mental Status: He is alert and oriented to person, place, and time  Psychiatric:      Comments: Speech: pressured, rapid, tangential          Invasive Devices     Peripheral Intravenous Line            Peripheral IV 09/01/20 Left Antecubital 1 day                Lab, Imaging and other studies: I have personally reviewed pertinent reports  CBC  WBC: 13 47  Hgb: 10 4  Hct: 32 0  Plt: 276    BMP  Na: 142  K+: 3 3  Cl: 107  BUN/Cr: 10/0 54  GFr: 101    Total CK: 2445    CT head 8/30: 1  Mild improvement in multifocal extra-axial hemorrhage   2  Stable size with interval decreased attentuations of hypodense subdural collections

## 2020-09-02 NOTE — ASSESSMENT & PLAN NOTE
- Acute encephalopathy, likely multifactorial in setting of traumatic brain injury, multiple wounds with rhabdomyolysis and acute kidney injury, sepsis  - Blood cultures growing 1/2 GNR, GPC; started on ancef  - Inpatient wound care consult for assistance with local wound care to multiple wound sites  Monitor for signs/symptoms of cellulitis or soft tissue infection   - Delirium precautions  - Review home medication list as well as past medical history when available  Patient unable to provide history at this time  No known contacts available for the patient at the time of admission and patient's primary care physician offices were closed on Fridays with no on call provider immediately available    - required posey belt last two nights, consider melatonin or other agents to help regulate sleep/wake cycle

## 2020-09-03 PROBLEM — N17.9 AKI (ACUTE KIDNEY INJURY) (HCC): Status: RESOLVED | Noted: 2020-08-28 | Resolved: 2020-09-03

## 2020-09-03 LAB
ANION GAP SERPL CALCULATED.3IONS-SCNC: 8 MMOL/L (ref 4–13)
BUN SERPL-MCNC: 12 MG/DL (ref 5–25)
CALCIUM SERPL-MCNC: 8.5 MG/DL (ref 8.3–10.1)
CHLORIDE SERPL-SCNC: 106 MMOL/L (ref 100–108)
CO2 SERPL-SCNC: 28 MMOL/L (ref 21–32)
CREAT SERPL-MCNC: 0.61 MG/DL (ref 0.6–1.3)
ERYTHROCYTE [DISTWIDTH] IN BLOOD BY AUTOMATED COUNT: 14.4 % (ref 11.6–15.1)
GFR SERPL CREATININE-BSD FRML MDRD: 96 ML/MIN/1.73SQ M
GLUCOSE SERPL-MCNC: 161 MG/DL (ref 65–140)
HCT VFR BLD AUTO: 31.9 % (ref 36.5–49.3)
HGB BLD-MCNC: 10.6 G/DL (ref 12–17)
MCH RBC QN AUTO: 30.6 PG (ref 26.8–34.3)
MCHC RBC AUTO-ENTMCNC: 33.2 G/DL (ref 31.4–37.4)
MCV RBC AUTO: 92 FL (ref 82–98)
PLATELET # BLD AUTO: 294 THOUSANDS/UL (ref 149–390)
PMV BLD AUTO: 9.4 FL (ref 8.9–12.7)
POTASSIUM SERPL-SCNC: 3.4 MMOL/L (ref 3.5–5.3)
RBC # BLD AUTO: 3.46 MILLION/UL (ref 3.88–5.62)
SODIUM SERPL-SCNC: 142 MMOL/L (ref 136–145)
WBC # BLD AUTO: 10.24 THOUSAND/UL (ref 4.31–10.16)

## 2020-09-03 PROCEDURE — 97116 GAIT TRAINING THERAPY: CPT

## 2020-09-03 PROCEDURE — 99233 SBSQ HOSP IP/OBS HIGH 50: CPT | Performed by: INTERNAL MEDICINE

## 2020-09-03 PROCEDURE — 80048 BASIC METABOLIC PNL TOTAL CA: CPT | Performed by: PHYSICIAN ASSISTANT

## 2020-09-03 PROCEDURE — 97530 THERAPEUTIC ACTIVITIES: CPT

## 2020-09-03 PROCEDURE — 85027 COMPLETE CBC AUTOMATED: CPT | Performed by: PHYSICIAN ASSISTANT

## 2020-09-03 PROCEDURE — 99232 SBSQ HOSP IP/OBS MODERATE 35: CPT | Performed by: SURGERY

## 2020-09-03 PROCEDURE — 99223 1ST HOSP IP/OBS HIGH 75: CPT | Performed by: INTERNAL MEDICINE

## 2020-09-03 RX ORDER — POTASSIUM CHLORIDE 20 MEQ/1
40 TABLET, EXTENDED RELEASE ORAL ONCE
Status: COMPLETED | OUTPATIENT
Start: 2020-09-03 | End: 2020-09-03

## 2020-09-03 RX ADMIN — CEFAZOLIN SODIUM 2000 MG: 2 SOLUTION INTRAVENOUS at 20:33

## 2020-09-03 RX ADMIN — BRIMONIDINE TARTRATE 1 DROP: 1.5 SOLUTION OPHTHALMIC at 05:15

## 2020-09-03 RX ADMIN — POTASSIUM CHLORIDE 40 MEQ: 1500 TABLET, EXTENDED RELEASE ORAL at 12:11

## 2020-09-03 RX ADMIN — CEFAZOLIN SODIUM 2000 MG: 2 SOLUTION INTRAVENOUS at 11:54

## 2020-09-03 RX ADMIN — LEVETIRACETAM 500 MG: 500 TABLET, FILM COATED ORAL at 08:04

## 2020-09-03 RX ADMIN — ZIPRASIDONE HYDROCHLORIDE 60 MG: 20 CAPSULE ORAL at 08:04

## 2020-09-03 RX ADMIN — LEVETIRACETAM 500 MG: 500 TABLET, FILM COATED ORAL at 21:20

## 2020-09-03 RX ADMIN — HEPARIN SODIUM 5000 UNITS: 5000 INJECTION INTRAVENOUS; SUBCUTANEOUS at 13:00

## 2020-09-03 RX ADMIN — CEFAZOLIN SODIUM 2000 MG: 2 SOLUTION INTRAVENOUS at 04:37

## 2020-09-03 RX ADMIN — HEPARIN SODIUM 5000 UNITS: 5000 INJECTION INTRAVENOUS; SUBCUTANEOUS at 21:20

## 2020-09-03 RX ADMIN — BRIMONIDINE TARTRATE 1 DROP: 1.5 SOLUTION OPHTHALMIC at 21:21

## 2020-09-03 RX ADMIN — HEPARIN SODIUM 5000 UNITS: 5000 INJECTION INTRAVENOUS; SUBCUTANEOUS at 05:15

## 2020-09-03 RX ADMIN — ZIPRASIDONE HYDROCHLORIDE 80 MG: 40 CAPSULE ORAL at 17:36

## 2020-09-03 RX ADMIN — CHLORHEXIDINE GLUCONATE 0.12% ORAL RINSE 15 ML: 1.2 LIQUID ORAL at 21:20

## 2020-09-03 RX ADMIN — CHLORHEXIDINE GLUCONATE 0.12% ORAL RINSE 15 ML: 1.2 LIQUID ORAL at 08:04

## 2020-09-03 RX ADMIN — TRAVOPROST 1 DROP: 0.04 SOLUTION/ DROPS OPHTHALMIC at 21:27

## 2020-09-03 RX ADMIN — BRIMONIDINE TARTRATE 1 DROP: 1.5 SOLUTION OPHTHALMIC at 13:01

## 2020-09-03 NOTE — PROGRESS NOTES
Progress Note - Lashawn Velasco 1945, 66 y o  male MRN: [id number]    Unit/Bed#: [facility] 603-01 Encounter: [id number]    Primary Care Provider: Melvin Velásquez MD   Date and time admitted to hospital: 8/28/2020 12:43 PM        No new Assessment & Plan notes have been filed under this hospital service since the last note was generated    Service: Surgery-General        Disposition: ***      SUBJECTIVE:  Chief Complaint: ***    Subjective: ***      OBJECTIVE:     Meds/Allergies     Current Facility-Administered Medications:     acetaminophen (TYLENOL) tablet 650 mg, 650 mg, Oral, Q6H PRN, Kimmy Elizabeth PA-C    brimonidine (ALPHAGAN P) 0 15 % ophthalmic solution 1 drop, 1 drop, Both Eyes, Q8H [facility], Valeriano Mcmullen PA-C, 1 drop at 09/03/20 0515    ceFAZolin (ANCEF) IVPB (premix) 2,000 mg 50 mL, 2,000 mg, Intravenous, Q8H, Valeriano Mcmullen PA-C, Stopped at 09/03/20 0515    chlorhexidine (PERIDEX) 0 12 % oral rinse 15 mL, 15 mL, Swish & Spit, Q12H [facility], Kimmy Elizabeth PA-C, 15 mL at 09/02/20 2145    heparin (porcine) subcutaneous injection 5,000 Units, 5,000 Units, Subcutaneous, Q8H [facility], Valeriano Mcmullen PA-C, 5,000 Units at 09/03/20 0515    levETIRAcetam (KEPPRA) tablet 500 mg, 500 mg, Oral, Q12H [facility], Kimmy Elizabeth PA-C, 500 mg at 09/02/20 2145    lidocaine (LIDODERM) 5 % patch 1 patch, 1 patch, Topical, Daily, Kimmy Elizabeth PA-C, 1 patch at 09/02/20 1006    ondansetron (ZOFRAN) injection 4 mg, 4 mg, Intravenous, Q6H PRN, Kimmy Elizabeth PA-C    travoprost (TRAVATAN-Z) 0 004 % ophthalmic solution 1 drop, 1 drop, Both Eyes, HS, Soledad Mcbride PA-C, 1 drop at 09/02/20 2146    ziprasidone (GEODON) capsule 60 mg, 60 mg, Oral, Daily, Valeriano Mcmullen PA-C    ziprasidone (GEODON) capsule 80 mg, 80 mg, Oral, QPM, Valeriano Mcmullen PA-C, 80 mg at 09/02/20 1907     Vitals:   Vitals:    09/02/20 2254   BP: 156/91   Pulse: 102   Resp: 20   Temp: 98 2 °F (36 8 °C)   SpO2: 99%       Intake/Output:  I/O 09/01 0701 - 09/02 0700 09/02 0701 - 09/03 0700 09/03 0701 - 09/04 0700    P  O  810 1098     I V  (mL/kg)       IV Piggyback 100      Total Intake(mL/kg) 910 (11 6) 1098 (14)     Urine (mL/kg/hr) 731 (0 4) 1575 (0 8)     Stool 0      Total Output 731 1575     Net +179 -477            Unmeasured Urine Occurrence 4 x 10 x     Unmeasured Stool Occurrence 1 x             Nutrition/GI Proph/Bowel Reg: Regular diet    Physical Exam:   GENERAL APPEARANCE: Patient in no acute distress  HEENT: NCAT; PERRL, EOMs intact; Mucous membranes moist  NECK / BACK: ***  CV: Regular rate and rhythm; no murmur/gallops/rubs appreciated  CHEST / LUNGS: Clear to auscultation; no wheezes/rales/rhonci  ABD: NABS; soft; non-distended; non-tender  EXT: +2 pulses bilaterally upper & lower extremities; no clubbing/cyanosis/edema  NEURO: GCS 15; no focal neurologic deficits; neurovascularly intact  SKIN: Warm, dry and well perfused; no rash; no jaundice  Invasive Devices     Peripheral Intravenous Line            Peripheral IV 09/01/20 Left Antecubital 2 days                 Lab Results: Results: I have personally reviewed pertinent reports   , BMP/CMP: No results found for: SODIUM, K, CL, CO2, ANIONGAP, BUN, CREATININE, GLUCOSE, CALCIUM, AST, ALT, ALKPHOS, PROT, BILITOT, EGFR, CBC: No results found for: WBC, HGB, HCT, MCV, PLT, ADJUSTEDWBC, MCH, MCHC, RDW, MPV, NRBC and Coagulation: No results found for: PT, INR, APTT  Imaging/EKG Studies: Results: I have personally reviewed pertinent reports      Other Studies: N/A  VTE Prophylaxis: {Pharmacologic VTE Prophylaxis:[id number]}     Penny Fairchild PA-C  9/3/2020 ***

## 2020-09-03 NOTE — ASSESSMENT & PLAN NOTE
- Acute encephalopathy on admission, likely multifactorial in setting of traumatic brain injury, multiple wounds with rhabdomyolysis and acute kidney injury, sepsis  Now significantly improved  - Blood cultures x2 with 1 of the tube growing coagulase-negative Staphylococcus and Pantoea agglomerans  Patient was started on Ancef based on sensitivities  Continue current IV antibiotic regimen and await Infectious Disease evaluation and recommendations  Anticipate a likely 2 week course of antibiotics with transition to oral antibiotics when appropriate for discharge  - Inpatient wound care evaluation and recommendations appreciated  - Delirium precautions

## 2020-09-03 NOTE — ASSESSMENT & PLAN NOTE
- Chronic/baseline history of dementia  - Delirium precautions  - Geriatric Medicine evaluation recommendations appreciated  - Continue current medication regimen   - Outpatient follow-up with PCP

## 2020-09-03 NOTE — ASSESSMENT & PLAN NOTE
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 as well as suspected trace subarachnoid hemorrhage in the left parietal region on initial CT scan  On repeat CT scan of head on 8/28/2020 following transferred to [facility], there appeared to be slight increase of the bilateral intraventricular hemorrhage, blossoming of the left parietal subarachnoid hemorrhage, bilateral subdural hematomas and suspected bleeding near the brainstem  - Repeat CT head on 8/30/2020 demonstrated improvement and/or stable hemorrhages  - Appreciate Neurosurgery evaluation and recommendations  No operative intervention recommended  - Awaiting repeat CT scan of the head on 09/04/2020 per Neurosurgery recommendations  - Continue Keppra to complete a 7 day course  - Patient cleared for chemical DVT prophylaxis with subcutaneous heparin  All other anti-platelet and anticoagulant medications to be held until cleared by Neurosurgery  - Continue to monitor neurologic exam   - Continue PT and OT evaluation and treatment as indicated  - Outpatient follow-up with Neurosurgery

## 2020-09-03 NOTE — SOCIAL WORK
CM spoke to pt's friend Amanuel Webster regarding d/c plan  Amanuel Webster explains that he spoke to the  who signed the pt's paperwork to obtain the pt's POA telephone number  Pt is recommended for rehab  Pt more alert and cognizant  CM will discuss rehab options with patient

## 2020-09-03 NOTE — PHYSICAL THERAPY NOTE
Physical Therapy Treatment Note     09/03/20 1129   Pain Assessment   Pain Assessment Tool 0-10   Pain Location/Orientation Orientation: Right;Location: Knee   Restrictions/Precautions   Weight Bearing Precautions Per Order Yes   RUE Weight Bearing Per Order WBAT   Other Precautions Hard of hearing; Fall Risk;Pain;Telemetry; Chair Alarm; Bed Alarm;WBS   General   Chart Reviewed Yes   Additional Pertinent History Extremly [place]   Family/Caregiver Present No   Cognition   Overall Cognitive Status WFL   Subjective   Subjective Pt  sleeping in bed upon entry  Agreeable to PT  Pt  follows directions well if its written down  Pt  is extremly [place]  Bed Mobility   Supine to Sit 4  Minimal assistance   Additional items Assist x 1; Increased time required;Verbal cues   Transfers   Sit to Stand 4  Minimal assistance   Additional items Assist x 1; Increased time required   Stand to Sit 4  Minimal assistance   Additional items Assist x 1; Increased time required;Verbal cues   Stand pivot 4  Minimal assistance   Additional items Assist x 1; Increased time required  (with RW)   Ambulation/Elevation   Gait pattern L Foot drag;Improper Weight shift;Decreased foot clearance; Forward Flexion; Inconsistent lisette; Step to;Excessively slow; Short stride   Gait Assistance 4  Minimal assist   Additional items Assist x 1;Verbal cues   Assistive Device Rolling walker   Distance 120ft, 20ft   Balance   Static Sitting Fair +   Ambulatory Poor +   Endurance Deficit   Endurance Deficit No   Activity Tolerance   Activity Tolerance Patient tolerated treatment well   Nurse Made Aware Yes   Assessment   Prognosis Good   Problem List Decreased skin integrity;Pain; Impaired hearing;Decreased mobility; Impaired balance;Decreased strength   Assessment Pt  is a high risk of fall with unsafe mobility practices and impaired hearing is a hinderance to following directions for safety  First amb   of 120ft with RW adn Min A with max VCs the gait noted to be unsteady with foot drag, stayed too from the RW  Safe gait training demonstrated and patient showed good carry over with the second ambulation  Pt  is recommended for rehab at d/c to maximize functional mobility and safety  Pt  has good potential for mobility progression however mode of communication with patient is an important factor for him to follow the safety directions  Barriers to Discharge Decreased caregiver support; Inaccessible home environment   Goals   Patient Goals Go home   STG Expiration Date 09/10/20   PT Treatment Day 1   Plan   Treatment/Interventions Functional transfer training;LE strengthening/ROM; Patient/family training;Bed mobility;Gait training;Spoke to nursing;Equipment eval/education   Progress Progressing toward goals   PT Frequency Other (Comment)  (3-6x/week)   Recommendation   PT Discharge Recommendation Post-Acute Rehabilitation Services   Equipment Recommended Walker   PT - OK to Discharge Yes         Keyur Monterroso, PTA

## 2020-09-03 NOTE — ASSESSMENT & PLAN NOTE
- Traumatic rhabdomyolysis, present on admission, with associated NAVID   - Rhabdomyolysis resolving   - Trend CK on an as-needed basis going forward  - May saline lock IV   - Continue to encourage oral intake and adequate hydration

## 2020-09-03 NOTE — PLAN OF CARE
Problem: Potential for Falls  Goal: Patient will remain free of falls  Description: INTERVENTIONS:  - Assess patient frequently for physical needs  -  Identify cognitive and physical deficits and behaviors that affect risk of falls    -  [place] fall precautions as indicated by assessment   - Educate patient/family on patient safety including physical limitations  - Instruct patient to call for assistance with activity based on assessment  - Modify environment to reduce risk of injury  - Consider OT/PT consult to assist with strengthening/mobility  Outcome: Progressing     Problem: PAIN - ADULT  Goal: Verbalizes/displays adequate comfort level or baseline comfort level  Description: Interventions:  - Encourage patient to monitor pain and request assistance  - Assess pain using appropriate pain scale  - Administer analgesics based on type and severity of pain and evaluate response  - Implement non-pharmacological measures as appropriate and evaluate response  - Consider cultural and social influences on pain and pain management  - Notify physician/advanced practitioner if interventions unsuccessful or patient reports new pain  Outcome: Progressing     Problem: INFECTION - ADULT  Goal: Absence or prevention of progression during hospitalization  Description: INTERVENTIONS:  - Assess and monitor for signs and symptoms of infection  - Monitor lab/diagnostic results  - Monitor all insertion sites, i e  indwelling lines, tubes, and drains  - Monitor endotracheal if appropriate and nasal secretions for changes in amount and color  - [place] appropriate cooling/warming therapies per order  - Administer medications as ordered  - Instruct and encourage patient and family to use good hand hygiene technique  - Identify and instruct in appropriate isolation precautions for identified infection/condition  Outcome: Progressing     Problem: SAFETY ADULT  Goal: Patient will remain free of falls  Description: INTERVENTIONS:  - Assess patient frequently for physical needs  -  Identify cognitive and physical deficits and behaviors that affect risk of falls    -  [place] fall precautions as indicated by assessment   - Educate patient/family on patient safety including physical limitations  - Instruct patient to call for assistance with activity based on assessment  - Modify environment to reduce risk of injury  - Consider OT/PT consult to assist with strengthening/mobility  Outcome: Progressing  Goal: Maintain or return to baseline ADL function  Description: INTERVENTIONS:  -  Assess patient's ability to carry out ADLs; assess patient's baseline for ADL function and identify physical deficits which impact ability to perform ADLs (bathing, care of mouth/teeth, toileting, grooming, dressing, etc )  - Assess/evaluate cause of self-care deficits   - Assess range of motion  - Assess patient's mobility; develop plan if impaired  - Assess patient's need for assistive devices and provide as appropriate  - Encourage maximum independence but intervene and supervise when necessary  - Involve family in performance of ADLs  - Assess for home care needs following discharge   - Consider OT consult to assist with ADL evaluation and planning for discharge  - Provide patient education as appropriate  Outcome: Progressing  Goal: Maintain or return mobility status to optimal level  Description: INTERVENTIONS:  - Assess patient's baseline mobility status (ambulation, transfers, stairs, etc )    - Identify cognitive and physical deficits and behaviors that affect mobility  - Identify mobility aids required to assist with transfers and/or ambulation (gait belt, sit-to-stand, lift, walker, cane, etc )  - [place] fall precautions as indicated by assessment  - Record patient progress and toleration of activity level on Mobility SBAR; progress patient to next Phase/Stage  - Instruct patient to call for assistance with activity based on assessment  - Consider rehabilitation consult to assist with strengthening/weightbearing, etc   Outcome: Progressing     Problem: DISCHARGE PLANNING  Goal: Discharge to home or other facility with appropriate resources  Description: INTERVENTIONS:  - Identify barriers to discharge w/patient and caregiver  - Arrange for needed discharge resources and transportation as appropriate  - Identify discharge learning needs (meds, wound care, etc )  - Arrange for interpretive services to assist at discharge as needed  - Refer to Case Management Department for coordinating discharge planning if the patient needs post-hospital services based on physician/advanced practitioner order or complex needs related to functional status, cognitive ability, or social support system  Outcome: Progressing     Problem: Knowledge Deficit  Goal: Patient/family/caregiver demonstrates understanding of disease process, treatment plan, medications, and discharge instructions  Description: Complete learning assessment and assess knowledge base    Interventions:  - Provide teaching at level of understanding  - Provide teaching via preferred learning methods  Outcome: Progressing     Problem: NEUROSENSORY - ADULT  Goal: Achieves stable or improved neurological status  Description: INTERVENTIONS  - Monitor and report changes in neurological status  - Monitor vital signs such as temperature, blood pressure, glucose, and any other labs ordered   - Initiate measures to prevent increased intracranial pressure  - Monitor for seizure activity and implement precautions if appropriate      Outcome: Progressing  Goal: Remains free of injury related to seizures activity  Description: INTERVENTIONS  - Maintain airway, patient safety  and administer oxygen as ordered  - Monitor patient for seizure activity, document and report duration and description of seizure to physician/advanced practitioner  - If seizure occurs,  ensure patient safety during seizure  - Reorient patient post seizure  - Seizure pads on all 4 side rails  - Instruct patient/family to notify RN of any seizure activity including if an aura is experienced  - Instruct patient/family to call for assistance with activity based on nursing assessment  - Administer anti-seizure medications if ordered    Outcome: Progressing  Goal: Achieves maximal functionality and self care  Description: INTERVENTIONS  - Monitor swallowing and airway patency with patient fatigue and changes in neurological status  - Encourage and assist patient to increase activity and self care     - Encourage visually impaired, hearing impaired and aphasic patients to use assistive/communication devices  Outcome: Progressing     Problem: SKIN/TISSUE INTEGRITY - ADULT  Goal: Skin integrity remains intact  Description: INTERVENTIONS  - Identify patients at risk for skin breakdown  - Assess and monitor skin integrity  - Assess and monitor nutrition and hydration status  - Monitor labs (i e  albumin)  - Assess for incontinence   - Turn and reposition patient  - Assist with mobility/ambulation  - Relieve pressure over bony prominences  - Avoid friction and shearing  - Provide appropriate hygiene as needed including keeping skin clean and dry  - Evaluate need for skin moisturizer/barrier cream  - Collaborate with interdisciplinary team (i e  Nutrition, Rehabilitation, etc )   - Patient/family teaching  Outcome: Progressing  Goal: Incision(s), wounds(s) or drain site(s) healing without S/S of infection  Description: INTERVENTIONS  - Assess and document risk factors for skin impairment   - Assess and document dressing, incision, wound bed, drain sites and surrounding tissue  - Consider nutrition services referral as needed  - Oral mucous membranes remain intact  - Provide patient/ family education  Outcome: Progressing  Goal: Oral mucous membranes remain intact  Description: INTERVENTIONS  - Assess oral mucosa and hygiene practices  - Implement preventative oral hygiene regimen  - Implement oral medicated treatments as ordered  - Initiate Nutrition services referral as needed  Outcome: Progressing     Problem: HEMATOLOGIC - ADULT  Goal: Maintains hematologic stability  Description: INTERVENTIONS  - Assess for signs and symptoms of bleeding or hemorrhage  - Monitor labs  - Administer supportive blood products/factors as ordered and appropriate  Outcome: Progressing     Problem: Prexisting or High Potential for Compromised Skin Integrity  Goal: Skin integrity is maintained or improved  Description: INTERVENTIONS:  - Identify patients at risk for skin breakdown  - Assess and monitor skin integrity  - Assess and monitor nutrition and hydration status  - Monitor labs   - Assess for incontinence   - Turn and reposition patient  - Assist with mobility/ambulation  - Relieve pressure over bony prominences  - Avoid friction and shearing  - Provide appropriate hygiene as needed including keeping skin clean and dry  - Evaluate need for skin moisturizer/barrier cream  - Collaborate with interdisciplinary team   - Patient/family teaching  - Consider wound care consult   Outcome: Progressing     Problem: SAFETY,RESTRAINT: NV/NON-SELF DESTRUCTIVE BEHAVIOR  Goal: Remains free of harm/injury (restraint for non violent/non self-detsructive behavior)  Description: INTERVENTIONS:  - Instruct patient/family regarding restraint use   - Assess and monitor physiologic and psychological status   - Provide interventions and comfort measures to meet assessed patient needs   - Identify and implement measures to help patient regain control  - Assess readiness for release of restraint   Outcome: Progressing  Goal: Returns to optimal restraint-free functioning  Description: INTERVENTIONS:  - Assess the patient's behavior and symptoms that indicate continued need for restraint  - Identify and implement measures to help patient regain control  - Assess readiness for release of restraint   Outcome: Progressing Problem: COPING  Goal: Pt/Family able to verbalize concerns and demonstrate effective coping strategies  Description: INTERVENTIONS:  - Assist patient/family to identify coping skills, available support systems and cultural and spiritual values  - Provide emotional support, including active listening and acknowledgement of concerns of patient and caregivers  - Reduce environmental stimuli, as able  - Provide patient education  - Assess for spiritual pain/suffering and initiate spiritual care, including notification of Pastoral Care or afshan based community as needed  - Assess effectiveness of coping strategies  Outcome: Progressing     Problem: DECISION MAKING  Goal: Pt/Family able to effectively weigh alternatives and participate in decision making related to treatment and care  Description: INTERVENTIONS:  - Identify decision maker  - Determine when there are differences among patient's view, family's view, and healthcare provider's view of patient condition and care goals  - Facilitate patient/family articulation of goals for care  - Help patient/family identify pros/cons of alternative solutions  - Provide information as requested by patient/family  - Respect patient/family rights related to privacy and sharing information   - Serve as a liaison between patient, family and health care team  - Initiate consults as appropriate (Ethics Team, Palliative Care, Family Care Conference, etc )  Outcome: Progressing     Problem: BEHAVIOR  Goal: Pt/Family maintain appropriate behavior and adhere to behavioral management agreement, if implemented  Description: INTERVENTIONS:  - Assess the family dynamic   - Encourage verbalization of thoughts and concerns in a socially appropriate manner  - Assess patient/family's coping skills and non-compliant behavior (including use of illegal substances)    - Utilize positive, consistent limit setting strategies supporting safety of patient, staff and others  - Initiate consult with Case Management, Spiritual Care or other ancillary services as appropriate  - If a patient's/visitor's behavior jeopardizes the safety of the patient, staff, or others, refer to organization procedure  - Notify Security of behavior or suspected illegal substances which indicate the need for search of the patient and/or belongings  - Encourage participation in the decision making process about a behavioral management agreement; implement if patient meets criteria  Outcome: Progressing     Problem: Nutrition/Hydration-ADULT  Goal: Nutrient/Hydration intake appropriate for improving, restoring or maintaining nutritional needs  Description: Monitor and assess patient's nutrition/hydration status for malnutrition  Collaborate with interdisciplinary team and initiate plan and interventions as ordered  Monitor patient's weight and dietary intake as ordered or per policy  Utilize nutrition screening tool and intervene as necessary  Determine patient's food preferences and provide high-protein, high-caloric foods as appropriate       INTERVENTIONS:  - Monitor oral intake, urinary output, labs, and treatment plans  - Assess nutrition and hydration status and recommend course of action  - Evaluate amount of meals eaten  - Assist patient with eating if necessary   - Allow adequate time for meals  - Recommend/ encourage appropriate diets, oral nutritional supplements, and vitamin/mineral supplements  - Order, calculate, and assess calorie counts as needed  - Recommend, monitor, and adjust tube feedings and TPN/PPN based on assessed needs  - Assess need for intravenous fluids  - Provide specific nutrition/hydration education as appropriate  - Include patient/family/caregiver in decisions related to nutrition  Outcome: Progressing

## 2020-09-03 NOTE — ASSESSMENT & PLAN NOTE
- Abrasions to multiple sites including all 4 extremities and his back  - Frequent repositioning, every 2 hours  - Inpatient Wound Care team following  Appreciate their recommendations  - Continue local wound care

## 2020-09-03 NOTE — ASSESSMENT & PLAN NOTE
- Suspected unwitnessed fall with unknown loss of consciousness and the below noted injuries  - Fall precautions  - Geriatric Medicine evaluation and recommendations appreciated  - PT and OT evaluation and treatment  - Case Management consultation for disposition planning/expected post acute care facility need

## 2020-09-03 NOTE — PLAN OF CARE
Problem: PHYSICAL THERAPY ADULT  Goal: Performs mobility at highest level of function for planned discharge setting  See evaluation for individualized goals  Description: Treatment/Interventions: Functional transfer training, LE strengthening/ROM, Therapeutic exercise, Endurance training, Patient/family training, Equipment eval/education, Bed mobility, Gait training, Spoke to nursing, OT  Equipment Recommended: Jenaro Yuan       See flowsheet documentation for full assessment, interventions and recommendations  Outcome: Progressing  Note: Prognosis: Good  Problem List: Decreased skin integrity, Pain, Impaired hearing, Decreased mobility, Impaired balance, Decreased strength  Assessment: Pt  is a high risk of fall with unsafe mobility practices and impaired hearing is a hinderance to following directions for safety  First amb  of 120ft with RW adn Min A with max VCs the gait noted to be unsteady with foot drag, stayed too from the RW  Safe gait training demonstrated and patient showed good carry over with the second ambulation  Pt  is recommended for rehab at d/c to maximize functional mobility and safety  Pt  has good potential for mobility progression however mode of communication with patient is an important factor for him to follow the safety directions  Barriers to Discharge: Decreased caregiver support, Inaccessible home environment     PT Discharge Recommendation: [id number]     PT - OK to Discharge: Yes    See flowsheet documentation for full assessment

## 2020-09-03 NOTE — CONSULTS
Consultation - Infectious Disease   Catherine Contreras  66 y o  male MRN: [id number]  Unit/Bed#: [facility] 603-01 Encounter: [id number]      IMPRESSION & RECOMMENDATIONS:   1  Systemic inflammatory response syndrome-tachycardia and leukocytosis  Suspect secondary to traumatic event with rhabdomyolysis and acute kidney injury with dehydration  Consideration for the possibility of sepsis in the setting of the positive blood cultures  However the positive blood cultures may represent a contaminant  Regardless the patient has received a one-week course of intravenous antibiotics and the follow-up blood cultures are negative  The leukocytosis has nearly resolved  The patient has remained afebrile and overall seems to have improved   -discontinue cefazolin after last dose today as that will have completed 7 days of treatment  -monitor CBC with diff and BMP  -follow up repeat blood cultures  -monitor temperature  -no additional workup or treatment needed    2  Polymicrobial bacteremia-with only 1 of 2 sets positive this could represent a contaminant  No definitive primary source was found  Regardless, the patient has received a full 7 day course of intravenous antibiotics and the follow-up blood cultures are negative  Suspect the patient has had an adequate treatment course if this is a true bacteremia   -discontinue antibiotics as above  -follow up repeat blood cultures  -no additional ID workup needed    3  Acute encephalopathy-in the setting of traumatic brain injury and baseline dementia  Likely multifactorial including the traumatic brain injury, metabolic derangements, and possible infection  Doubt any primary CNS infection  Patient's cognition has apparently improved since admission  -monitor cognition  -continue treatment of metabolic issues  -no additional ID workup needed    4  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    Clinically and radiographically stabilized without need for any surgical intervention  -neurosurgical follow-up as needed  -monitor cognition as above    5  Traumatic rhabdomyolysis-in the setting of a patient being found down  The CPK has trended downward   -keep hydrated  -monitor CPK     Have discussed the above management plan in detail with the primary service    Extensive review of the medical records in epic including review of the notes, radiographs, and laboratory results     HISTORY OF PRESENT ILLNESS:  Reason for Consult:  Bacteremia  HPI: Em Davis  is a 66y o  year old male with diabetes mellitus, and dementia admitted to [facility] in [place] after being transferred from [facility] after being found down for an unknown time with multiple diffuse body abrasions and having confusion and on imaging found to have a subarachnoid and subdural hemorrhage who I am asked to assist with management of bacteremia  Patient apparently sustained multiple abrasions, developed subarachnoid and subdural hemorrhages, was found to have acute kidney injury and rhabdomyolysis  He has been treated supportively  On his initial presentation he had a brisk leukocytosis and some concern about the possibility of an infectious process and therefore blood cultures were obtained and he was started on cefepime and Flagyl after receiving a dose of Zosyn  He was maintained on the cefepime and Flagyl for the next 4 days and his blood cultures came back positive for gram-positive cocci in clusters and gram-negative rods in 1 of 2 sets  The organisms were identified as a coagulase-negative Staphylococcus and a Pantoea agglomerans  Based upon the sensitivities the antibiotics were simplified to cefazolin  During the patient's hospital stay he has remained afebrile and hemodynamically stable  He is cognitively improved and his metabolic parameters have improved    He currently denies any fever chills or sweats, denies any nausea vomiting or diarrhea, denies any pain  Because of the very limited ability to obtain history from the patient, virtually the entire history is through chart review and discussion with the trauma service    REVIEW OF SYSTEMS:  Very limited due to the patient's cognitive state and decreased hearing    PAST MEDICAL HISTORY:  No past medical history on file  No past surgical history on file  FAMILY HISTORY:  Non-contributory    SOCIAL HISTORY:  Social History   Social History     Substance and Sexual Activity   Alcohol Use Not on file     Social History     Substance and Sexual Activity   Drug Use Not on file     Social History     Tobacco Use   Smoking Status Not on file       ALLERGIES:  No Known Allergies    MEDICATIONS:  All current active medications have been reviewed    Antibiotics:  Cefazolin 3, total antibiotics 7    PHYSICAL EXAM:  Temp:  [97 7 °F (36 5 °C)-98 2 °F (36 8 °C)] 97 7 °F (36 5 °C)  HR:  [102-105] 105  Resp:  [18-20] 18  BP: (116-156)/(73-91) 116/73  SpO2:  [95 %-99 %] 95 %  Temp (24hrs), Av °F (36 7 °C), Min:97 7 °F (36 5 °C), Max:98 2 °F (36 8 °C)  Current: Temperature: 97 7 °F (36 5 °C)    Intake/Output Summary (Last 24 hours) at 9/3/2020 1629  Last data filed at 9/3/2020 1300  Gross per 24 hour   Intake 1400 ml   Output 1500 ml   Net -100 ml       General Appearance:  Elderly, debilitated, nontoxic, in no acute distress   Head:  Normocephalic, without obvious abnormality, atraumatic   Eyes:  Conjunctiva pink and sclera anicteric, both eyes   Nose: Nares normal, mucosa normal, no drainage   Throat: Oropharynx moist without lesions   Neck: Supple, symmetrical, no adenopathy, no tenderness/mass/nodules   Back:   Symmetric, no curvature, ROM normal, no CVA tenderness   Lungs:   Clear to auscultation bilaterally, respirations unlabored   Chest Wall:  No tenderness or deformity   Heart:  Tachycardic; no murmur, rub or gallop   Abdomen:   Soft, non-tender, non-distended, positive bowel sounds Extremities: No cyanosis, clubbing or edema   Skin: No rashes or lesions  No draining wounds noted  Lymph nodes: Cervical, supraclavicular nodes normal   Neurologic: Alert, able to answer very simple yes no questions       LABS, IMAGING, & OTHER STUDIES:  Lab Results:  I have personally reviewed pertinent labs  Results from last 7 days   Lab Units 09/03/20  1003 09/02/20  0457 08/31/20  0757   WBC Thousand/uL 10 24* 13 47* 10 68*   HEMOGLOBIN g/dL 10 6* 10 4* 9 8*   PLATELETS Thousands/uL 294 276 204     Results from last 7 days   Lab Units 09/03/20  1004 09/02/20  0457 08/31/20  1001  08/29/20  0618  08/28/20  0836   SODIUM mmol/L 142 142 144   < > 147*   < > 143   POTASSIUM mmol/L 3 4* 3 3* 3 3*   < > 4 1   < > 3 9   CHLORIDE mmol/L 106 107 110*   < > 115*   < > 106   CO2 mmol/L 28 31 27   < > 22   < > 22   BUN mg/dL 12 10 14   < > 31*   < > 41*   CREATININE mg/dL 0 61 0 54* 0 68   < > 0 68   < > 1 16   EGFR ml/min/1 73sq m 96 101 92   < > 92   < > 60   CALCIUM mg/dL 8 5 8 0* 7 7*   < > 8 2*   < > 8 9   AST U/L  --   --   --   --  390*  --  130*   ALT U/L  --   --   --   --  108*  --  47   ALK PHOS U/L  --   --   --   --  83  --  84    < > = values in this interval not displayed  Results from last 7 days   Lab Units 09/01/20  1412 08/28/20  1747 08/28/20  0930 08/28/20  0837 08/28/20  0836   BLOOD CULTURE  No Growth at 24 hrs  No Growth at 24 hrs   --  No Growth After 5 Days  --  Staphylococcus coagulase negative*  Pantoea agglomerans*   GRAM STAIN RESULT   --   --   --   --  Gram positive cocci in clusters*  Gram negative rods*   URINE CULTURE   --   --   --  10,000-19,000 cfu/ml   --    MRSA CULTURE ONLY   --  No Methicillin Resistant Staphlyococcus aureus (MRSA) isolated  --   --   --      Results from last 7 days   Lab Units 08/28/20  0835   PROCALCITONIN ng/ml 11 93*                   Imaging Studies:     CT head-slight improvement in multifocal extra-axial hemorrhage    Stable hypodense subdural collection    Chest x-ray-clear lung fields    Images personally reviewed by me in PACS

## 2020-09-03 NOTE — ASSESSMENT & PLAN NOTE
- Blood cultures x2 with 1 of the tube growing coagulase-negative Staphylococcus and Pantoea agglomerans  Patient was started on Ancef based on sensitivities  Continue current IV antibiotic regimen and await Infectious Disease evaluation and recommendations  Anticipate a likely 2 week course of antibiotics with transition to oral antibiotics when appropriate for discharge   - Repeat blood cultures from 09/01/2020 remain negative to date  - Patient without fevers over the last 24 hours

## 2020-09-03 NOTE — PROGRESS NOTES
Progress Note - Rebekah Wolf 1945, 66 y o  male MRN: [id number]    Unit/Bed#: [facility] 603-01 Encounter: [id number]    Primary Care Provider: Tianna Soler MD   Date and time admitted to hospital: 8/28/2020 12:43 PM        [address]  - Suspected unwitnessed fall with unknown loss of consciousness and the below noted injuries  - Fall precautions  - Geriatric Medicine evaluation and recommendations appreciated  - PT and OT evaluation and treatment  - Case Management consultation for disposition planning/expected post acute care facility need  * TBI (traumatic brain injury) ([facility] [address] )  Assessment & Plan  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 as well as suspected trace subarachnoid hemorrhage in the left parietal region on initial CT scan  On repeat CT scan of head on 8/28/2020 following transferred to [facility], there appeared to be slight increase of the bilateral intraventricular hemorrhage, blossoming of the left parietal subarachnoid hemorrhage, bilateral subdural hematomas and suspected bleeding near the brainstem  - Repeat CT head on 8/30/2020 demonstrated improvement and/or stable hemorrhages  - Appreciate Neurosurgery evaluation and recommendations  No operative intervention recommended  - Awaiting repeat CT scan of the head on 09/04/2020 per Neurosurgery recommendations  - Continue Keppra to complete a 7 day course  - Patient cleared for chemical DVT prophylaxis with subcutaneous heparin  All other anti-platelet and anticoagulant medications to be held until cleared by Neurosurgery  - Continue to monitor neurologic exam   - Continue PT and OT evaluation and treatment as indicated  - Outpatient follow-up with Neurosurgery      Encephalopathy acute  Assessment & Plan  - Acute encephalopathy on admission, likely multifactorial in setting of traumatic brain injury, multiple wounds with rhabdomyolysis and acute kidney injury, sepsis  Now significantly improved  - Blood cultures x2 with 1 of the tube growing coagulase-negative Staphylococcus and Pantoea agglomerans  Patient was started on Ancef based on sensitivities  Continue current IV antibiotic regimen and await Infectious Disease evaluation and recommendations  Anticipate a likely 2 week course of antibiotics with transition to oral antibiotics when appropriate for discharge  - Inpatient wound care evaluation and recommendations appreciated  - Delirium precautions  Traumatic rhabdomyolysis ([address] )  Assessment & Plan  - Traumatic rhabdomyolysis, present on admission, with associated NAVID   - Rhabdomyolysis resolving   - Trend CK on an as-needed basis going forward  - May saline lock IV   - Continue to encourage oral intake and adequate hydration  Abrasions of multiple sites  Assessment & Plan  - Abrasions to multiple sites including all 4 extremities and his back  - Frequent repositioning, every 2 hours  - Inpatient Wound Care team following  Appreciate their recommendations  - Continue local wound care  Dementia [facility])  Assessment & Plan  - Chronic/baseline history of dementia  - Delirium precautions  - Geriatric Medicine evaluation recommendations appreciated  - Continue current medication regimen   - Outpatient follow-up with PCP  NAVID (acute kidney injury) (HCC)resolved as of 9/3/2020  Assessment & Plan  - Acute kidney injury, present on admission now resolved  - Monitor renal function and electrolytes  - Avoid nephrotoxic medications and hypotension  Pressure injury of back, stage 1  Assessment & Plan  - Suspected stage I pressure injury of the back  - Inpatient Wound Care service evaluation and recommendations appreciated  - Continue with local wound care to multiple wound sites        Type 2 diabetes mellitus, without long-term current use of insulin [facility])  Assessment & Plan    Lab Results   Component Value Date    HGBA1C 5 7 (H) 08/29/2020     - Documented history of type 2 diabetes mellitus with questionable use of metformin per only available medication list from February 2018  - Currently without hyperglycemia on initial lab workup and most recent hemoglobin A1c from March 2020 was 5 7  - Not on ISS, continue to monitor via routine labwork    Subarachnoid hemorrhage [facility])  Assessment & Plan  - Please see outlined management under TBI diagnosis  Subdural hematoma (HCC)  Assessment & Plan  - Please see outlined management under TBI diagnosis  IVH (intraventricular hemorrhage) ([facility] [address] )  Assessment & Plan  - Please see outlined management under TBI diagnosis  Blood culture positive for microorganism  Assessment & Plan  - Blood cultures x2 with 1 of the tube growing coagulase-negative Staphylococcus and Pantoea agglomerans  Patient was started on Ancef based on sensitivities  Continue current IV antibiotic regimen and await Infectious Disease evaluation and recommendations  Anticipate a likely 2 week course of antibiotics with transition to oral antibiotics when appropriate for discharge   - Repeat blood cultures from 09/01/2020 remain negative to date  - Patient without fevers over the last 24 hours  Disposition:  Anticipate discharge to post acute care facility when medically appropriate and social issues sorted out  Continue PT and OT evaluation and treatment as indicated  Case Management following for disposition planning; currently patient appropriate to make decisions independently and POA is unclear  SUBJECTIVE:  Chief Complaint:  "I need to do my taxes      Subjective:  Patient is feeling okay this morning  He denies having any pain  He is tolerating a diet without nausea, vomiting or constipation  The nursing staff notes no significant events overnight        OBJECTIVE:     Meds/Allergies     Current Facility-Administered Medications:     acetaminophen (TYLENOL) tablet 650 mg, 650 mg, Oral, Q6H PRN, Manda Foy PA-C    brimonidine (ALPHAGAN P) 0 15 % ophthalmic solution 1 drop, 1 drop, Both Eyes, Q8H [address], Joselo Velazco PA-C, 1 drop at 09/03/20 0515    ceFAZolin (ANCEF) IVPB (premix) 2,000 mg 50 mL, 2,000 mg, Intravenous, Q8H, Joselo Velazco PA-C, Last Rate: 100 mL/hr at 09/03/20 1154, 2,000 mg at 09/03/20 1154    chlorhexidine (PERIDEX) 0 12 % oral rinse 15 mL, 15 mL, Swish & Spit, Q12H [address], Manda Foy PA-C, 15 mL at 09/03/20 0804    heparin (porcine) subcutaneous injection 5,000 Units, 5,000 Units, Subcutaneous, Q8H [address], Joselo Velazco PA-C, 5,000 Units at 09/03/20 0515    levETIRAcetam (KEPPRA) tablet 500 mg, 500 mg, Oral, Q12H [address], Cory Koroma PA-C, 500 mg at 09/03/20 0804    lidocaine (LIDODERM) 5 % patch 1 patch, 1 patch, Topical, Daily, Manda Foy PA-C, 1 patch at 09/02/20 1006    ondansetron (ZOFRAN) injection 4 mg, 4 mg, Intravenous, Q6H PRN, Manda Fyo PA-C    potassium chloride (K-DUR,KLOR-CON) CR tablet 40 mEq, 40 mEq, Oral, Once, Reynaldo Serrano PA-C    travoprost (TRAVATAN-Z) 0 004 % ophthalmic solution 1 drop, 1 drop, Both Eyes, HS, Soledad Mcbride PA-C, 1 drop at 09/02/20 2146    ziprasidone (GEODON) capsule 60 mg, 60 mg, Oral, Daily, Joselo Velazco PA-C, 60 mg at 09/03/20 0804    ziprasidone (GEODON) capsule 80 mg, 80 mg, Oral, QPM, Joselo Velazco PA-C, 80 mg at 09/02/20 1907     Vitals:   Vitals:    09/03/20 0819   BP: 150/91   Pulse: 104   Resp: 18   Temp: 97 7 °F (36 5 °C)   SpO2: 99%       Intake/Output:  I/O       09/01 0701 - 09/02 0700 09/02 0701 - 09/03 0700 09/03 0701 - 09/04 0700    P  O  810 1098     I V  (mL/kg)       IV Piggyback 100 50     Total Intake(mL/kg) 910 (11 6) 1148 (14 6)     Urine (mL/kg/hr) 731 (0 4) 1725 (0 9)     Stool 0      Total Output 731 1725     Net +179 -577            Unmeasured Urine Occurrence 4 x 10 x     Unmeasured Stool Occurrence 1 x             Nutrition/GI Proph/Bowel Reg: Regular diet    Physical Exam:   GENERAL APPEARANCE: Patient in no acute distress  HEENT: NCAT; PERRL, EOMs intact; Mucous membranes moist  NECK / BACK:  Minimally tender wounds on the left lateral back  No midline cervical, thoracic or lumbar spine tenderness  CV: Regular rate and rhythm; no murmur/gallops/rubs appreciated  CHEST / LUNGS: Clear to auscultation; no wheezes/rales/rhonci  ABD: NABS; soft; non-distended; non-tender  EXT: +2 pulses bilaterally upper & lower extremities; no clubbing/cyanosis/edema  Patient with normal range of motion all 4 extremities without pain  NEURO: GCS 14 (E4, V4, M6); no focal neurologic deficits; neurovascularly intact  SKIN: Warm, dry and well perfused; no rash; no jaundice  There are abrasions/soft tissue wounds consistent with pressure injury on all 4 extremities and on patient's back that are healing appropriately with minimal tenderness and no signs of infection  Invasive Devices     Peripheral Intravenous Line            Peripheral IV 09/01/20 Left Antecubital 2 days          Drain            External Urinary Catheter Small less than 1 day                 Lab Results: Results: I have personally reviewed pertinent reports  Imaging/EKG Studies: Results: I have personally reviewed pertinent reports      Other Studies: N/A  VTE Prophylaxis: Sequential compression device (Venodyne)  and Heparin     Lukas Chavez PA-C  9/3/2020 07:22 AM

## 2020-09-03 NOTE — ASSESSMENT & PLAN NOTE
- Suspected stage I pressure injury of the back  - Inpatient Wound Care service evaluation and recommendations appreciated  - Continue with local wound care to multiple wound sites

## 2020-09-03 NOTE — PROGRESS NOTES
Progress Note - Geriatric Medicine   Annita Days  66 y o  male MRN: [id number]  Unit/Bed#: [facility] 603-01 Encounter: [id number]      Assessment/Plan:    1  Subdural Hematoma  - currently under the care of trauma team and neurosurgery  - Continue neurochecks frequently  - Continue appropriate blood pressure control  - Continue Keppra for seizure prophylaxis  - Continue PT/OT  - repeat CT head without contrast on 9/4/20    2  Subarachnoid hemorrhage  - Currently under the care of trauma team and neurosurgery  - continue neurochecks  - fall risk precaution    3  Traumatic brain Injury  - Present on Admission    - Initial CT scan showed small intraventricular hemorrhage in bilateral occipital horns as well as trace subarachnoid hemorrhage in the left parietal region  Repeat CT on admission shows slight increase of the bilateraly intraventricular hemorrhage, bilateral subdural hemorrhage   - Neurosurgery following  - Continue Neuro checks  - increased risk for delirium  - Continue PT/OT   - Continue Keppra    4  Suspected Fall  - history of patient being found on the floor by the visiting nurse  - high risk for repeat falls  - fall precautions in place  - encourage Ambulation  - PT/OT  - continue Lidoderm  - Pain control with Tyelnol 650mg q6hrs prn    5  Metabolic encephalopathy  - unsure of baseline, but improving from admission  - Blood cultures growing: Coagulase negative S Aureus  - Currently on Ancef q8hrs  - blood cultures repeat pending  - ID consulted, pending recommendations  - repeat CBC tomorrow    6  Traumatic Rhabdomyolysis  - present on admission, currently improving  - Continue fluid hydration and CK monitoring  - CK yesterday: 2445  - Kidney function appears to be improving    7  Suspected cognitive impairment at baseline  - despite multiple attempts from team to discuss with PCP, unable to obtain reach them  - Continue to monitor  - Delirium precautions    8   Hearing Impairment  - difficulty hearing, attempt to use hearing amplifier  - will need to follow up outpatient for hearing aids  - high risk for falls  - Use white board     9  Multiple abrasions  - improving, present on admission possibly from fall  - continue local wound care per trauma team  - Wound care team following    10  Delirium Precautions  - high risk for delirium considering age, TBI, subdural hematoma and underlying condition  - orient patient using consistent staffing and appropriate lighting  - Avoiding overstimulation  - Encourage regular routine and proper sleep-week cycle  - Avoid restraints and delirium inducing medications  - if agitated or aggressive, encourage frequent reorientation   - ensure fluid and electrolyte balance and effective pain management  - If needed and all other conservative management has failed can try posey belts  reduce overnight interruptions, group overnight vitals/labs/nursing checks  - Maintain normal sleep/wake cycle by reducing dim lights, close blinds to help reduce distractions  - monitor intakes and outputs    11  Pressure injury of back, stage 1  - continue local wound care as per trauma     12  Suspected Bipolar disorder  - at home on Geodon 60mg in the morning and 80 mg at night as per pharmacy  - restarted by primary team  - continue to monitor     Subjective: This is a 67 y/o male with unknown past medical history who was seen for geriatrics follow up  He is currently being hospitalized for subdural and subarachnoid hemorrhage  He was seen and evaluated at bedside in collaboration with nursing  He is hard of hearing and uses hearing aids at home  Used white board to help communicate, he is able to read and answer appropriately to questions  He denies any pain  Reports eating well and sleeping well  He was restarted on his psychiatry medications  Patient appears more calmer today  No significant events overnight  No follow up from PCP despite multiple attempts to reach them       Review of Systems   Constitutional: Negative for fever  HENT: Negative for congestion and sore throat  Respiratory: Negative for cough, chest tightness and shortness of breath  Cardiovascular: Negative for chest pain, palpitations and leg swelling  Gastrointestinal: Negative for abdominal distention, abdominal pain, constipation, diarrhea, nausea and vomiting  Genitourinary: Negative for dysuria and frequency  Musculoskeletal: Negative for back pain and myalgias  Skin: Positive for rash and wound  Neurological: Negative for dizziness, weakness, numbness and headaches  Psychiatric/Behavioral: Negative for sleep disturbance  Home Medications: [facility] [place]  Geodon 60mg in AM and 80mg HS  Lipitor 80mg once daily  Metformin 1000mg bid  Travatan-z eydrops 1 to each eye in the morning  Alphagan P 0 15% to both eyes BID  Objective:     Vitals: Blood pressure 150/91, pulse 104, temperature 97 7 °F (36 5 °C), temperature source Axillary, resp  rate 18, height 5' 7" (1 702 m), weight 78 4 kg (172 lb 14 4 oz), SpO2 99 %  ,Body mass index is 27 08 kg/m²  Intake/Output Summary (Last 24 hours) at 9/3/2020 1317  Last data filed at 9/3/2020 0900  Gross per 24 hour   Intake 1268 ml   Output 1500 ml   Net -232 ml       Current Medications: Reviewed    Physical Exam:   Physical Exam  Constitutional:       Comments: dishelved   HENT:      Head: Normocephalic and atraumatic  Nose: Nose normal       Mouth/Throat:      Mouth: Mucous membranes are moist    Eyes:      Extraocular Movements: Extraocular movements intact  Pupils: Pupils are equal, round, and reactive to light  Neck:      Musculoskeletal: Neck supple  Cardiovascular:      Rate and Rhythm: Normal rate and regular rhythm  Pulses: Normal pulses  Pulmonary:      Effort: Pulmonary effort is normal       Breath sounds: Normal breath sounds  Abdominal:      General: Abdomen is flat   Bowel sounds are normal  There is no distension  Musculoskeletal:      Comments: Multiple abrasions and bruising noted on bilateral lower extremities and upper lower extremity  Neurological:      Mental Status: He is alert and oriented to person, place, and time  Psychiatric:         Mood and Affect: Affect is labile  Speech: Speech is rapid and pressured  Judgment: Judgment is impulsive  Invasive Devices     Peripheral Intravenous Line            Peripheral IV 09/01/20 Left Antecubital 2 days          Drain            External Urinary Catheter Small less than 1 day                Lab, Imaging and other studies: I have personally reviewed pertinent reports  CBC  WBC: 10 24  Hgb: 10 6  Hct: 31 9  Plt: 294    BMP  Na: 142  K+: 3 4  Cl: 106  BUN/Cr: 12/0 61  GFr: 96    CT head 8/30: 1  Mild improvement in multifocal extra-axial hemorrhage   2  Stable size with interval decreased attentuations of hypodense subdural collections

## 2020-09-04 ENCOUNTER — APPOINTMENT (INPATIENT)
Dept: RADIOLOGY | Facility: HOSPITAL | Age: 75
DRG: 963 | End: 2020-09-04
Payer: MEDICARE

## 2020-09-04 PROCEDURE — 70450 CT HEAD/BRAIN W/O DYE: CPT

## 2020-09-04 PROCEDURE — 99233 SBSQ HOSP IP/OBS HIGH 50: CPT | Performed by: INTERNAL MEDICINE

## 2020-09-04 PROCEDURE — 99232 SBSQ HOSP IP/OBS MODERATE 35: CPT | Performed by: SURGERY

## 2020-09-04 PROCEDURE — 97116 GAIT TRAINING THERAPY: CPT

## 2020-09-04 PROCEDURE — 99232 SBSQ HOSP IP/OBS MODERATE 35: CPT | Performed by: INTERNAL MEDICINE

## 2020-09-04 PROCEDURE — G1004 CDSM NDSC: HCPCS

## 2020-09-04 RX ORDER — POTASSIUM CHLORIDE 20 MEQ/1
40 TABLET, EXTENDED RELEASE ORAL ONCE
Status: COMPLETED | OUTPATIENT
Start: 2020-09-04 | End: 2020-09-04

## 2020-09-04 RX ADMIN — LEVETIRACETAM 500 MG: 500 TABLET, FILM COATED ORAL at 09:17

## 2020-09-04 RX ADMIN — TRAVOPROST 1 DROP: 0.04 SOLUTION/ DROPS OPHTHALMIC at 22:05

## 2020-09-04 RX ADMIN — BRIMONIDINE TARTRATE 1 DROP: 1.5 SOLUTION OPHTHALMIC at 05:09

## 2020-09-04 RX ADMIN — ZIPRASIDONE HYDROCHLORIDE 80 MG: 40 CAPSULE ORAL at 18:12

## 2020-09-04 RX ADMIN — ZIPRASIDONE HYDROCHLORIDE 60 MG: 20 CAPSULE ORAL at 09:19

## 2020-09-04 RX ADMIN — POTASSIUM CHLORIDE 40 MEQ: 1500 TABLET, EXTENDED RELEASE ORAL at 09:17

## 2020-09-04 RX ADMIN — BRIMONIDINE TARTRATE 1 DROP: 1.5 SOLUTION OPHTHALMIC at 22:05

## 2020-09-04 RX ADMIN — BRIMONIDINE TARTRATE 1 DROP: 1.5 SOLUTION OPHTHALMIC at 18:12

## 2020-09-04 RX ADMIN — CHLORHEXIDINE GLUCONATE 0.12% ORAL RINSE 15 ML: 1.2 LIQUID ORAL at 22:05

## 2020-09-04 RX ADMIN — HEPARIN SODIUM 5000 UNITS: 5000 INJECTION INTRAVENOUS; SUBCUTANEOUS at 18:12

## 2020-09-04 RX ADMIN — LIDOCAINE 5% 1 PATCH: 700 PATCH TOPICAL at 09:16

## 2020-09-04 RX ADMIN — HEPARIN SODIUM 5000 UNITS: 5000 INJECTION INTRAVENOUS; SUBCUTANEOUS at 22:05

## 2020-09-04 RX ADMIN — CHLORHEXIDINE GLUCONATE 0.12% ORAL RINSE 15 ML: 1.2 LIQUID ORAL at 09:17

## 2020-09-04 RX ADMIN — HEPARIN SODIUM 5000 UNITS: 5000 INJECTION INTRAVENOUS; SUBCUTANEOUS at 05:09

## 2020-09-04 NOTE — ASSESSMENT & PLAN NOTE
- Blood cultures x2 with 1 of the tube growing coagulase-negative Staphylococcus and Pantoea agglomerans  Patient was started on Ancef based on sensitivities  - Repeat blood cultures from 09/01/2020 remain negative to date  - Patient without fevers over the last 24 hours    - antibiotics stopped by ID on 9/3

## 2020-09-04 NOTE — PROGRESS NOTES
Progress Note - Geriatric Medicine   Myesha Vanegas  66 y o  male MRN: [id number]  Unit/Bed#: [facility] 603-01 Encounter: [id number]      Assessment/Plan:    1  Traumatic Brain Injury  - Initial CT scan showed small intraventricular hemorrhage in bilateral occipital horns as well as trace subarachnoid hemorrhage in the left parietal region  Repeat CT on admission shows slight increase of the bilateraly intraventricular hemorrhage, bilateral subdural hemorrhage   - Repeat CT today shows marked improved from admission  - Neurosurgery following, appreciate recommendations  No interventions at this time  - Continue Neurochecks every 4 hours  - Continue PT/OT  - Follow up outpatient with neurosurgery  - Will complete seizure prophylaxis with Keppra 500mg q12 today  - Continue fall precautions  - Monitor vitals periodically and maintain appropriate blood pressure control    2  Subarachnoid hemorrhage  - trace subarachnoid hemorrhage seen on initial CT, which has improved this morning  - monitor h&h  - continue neurochecks  - fall risk precaution  - PT/OT    3  Subdural hematoma  - Repeat CT showing improving hemorrhage  - Continue neurochecks frequently  - Continue appropriate blood pressure control  - Continue Keppra for seizure prophylaxis  - Continue PT/OT  - repeat CT head without contrast on 9/4/20    4  Suspected Fall  - history of patient being found on the ground for unknown downtime   - high risk for repeat falls  - fall precautions in place  - encourage Ambulation  - PT/OT  - continue Lidoderm  - Pain control with Tyelnol 650mg q6hrs prn  - Patient would benefit from rehab placement for ambulatory dysfunction     5  Suspected cognitive impairment at baseline  - Unsure of baseline  - multiple attempts made to PCP by the team, however, no call backs received   - Continue to monitor  - Delirium precautions  - patient would benefit from long term care placement in the future after STR   Concerns raised from patient's pharmacy that he is also not safe at home  No caregiver support  - Will benefit from [facility] [place] and comprehensive geriatric assessment outpatient    6  Ambulatory dysfunction  -Multifactorial in etiology: age, recent falls, intracranial hemorrhage, recent hospitalization and underlying cognitive impairment, deconditioning  - PT following, appreciate recommendations  Will benefit from STR    - Increased risk for future falls  - Frailty score of 4    7  Traumatic Rhabdomyolysis  - present on admission, currently improving  - Continue fluid hydration and CK monitoring  - CK 9/2: 2445  - Kidney function appears to be improving    7  Metabolic encephalopathy  - unsure of baseline, but improving from admission  -Blood cultures 1/2 on 8/28 grew Staph coagulase negative   -Repeat blood cultures 9/1 NGTD for 48 hrs  - ID consulted, appreciate recommendations  Recommended to discontinue Ancef after today  -Continue to monitor with neurochecks q4hr    8  Hearing Impairment  - difficulty hearing, attempt to use hearing amplifier  - able to read and comprehend when writing questions on white board  - high risk for falls  - will need to get his hearing aids prior to discharge to rehab    9  Multiple abrasions  - improving, present on admission possibly from fall  - continue local wound care per trauma team  - Wound care team following    10  Delirium Precautions  - high risk for delirium considering age, TBI, subdural hematoma and underlying condition  - orient patient using consistent staffing and appropriate lighting  - Avoiding overstimulation  - Encourage regular routine and proper sleep-week cycle  - Avoid restraints and delirium inducing medications  - if agitated or aggressive, encourage frequent reorientation   - ensure fluid and electrolyte balance and effective pain management  - If needed and all other conservative management has failed can try posey belts     reduce overnight interruptions, group overnight vitals/labs/nursing checks  - Maintain normal sleep/wake cycle by reducing dim lights, close blinds to help reduce distractions  - monitor intakes and outputs    11  Pressure injury of back, stage 1  - continue local wound care as per trauma     12  Suspected Bipolar disorder  - at home on Geodon 60mg in the morning and 80 mg at night as per pharmacy  - appears more calmer   - restarted by primary team  - continue to monitor     Subjective: This is a 67 y/o male with unknown past medical history who was seen for geriatrics follow up  He is currently being hospitalized for subdural and subarachnoid hemorrhage s/p unwitnessed fall for unknown downtime  He was seen and evaluated at bedside in collaboration with nursing  Used white board to help communicate, he is able to read and answer appropriately to questions  He denies any pain  Reports eating well and sleeping well  He underwent a repeat CT yesterday which showed improvement  As per nursing, patient does gets disruptive at times and tries to get out of bed but he he can be redirected easily  Review of Systems   Constitutional: Negative for fever  HENT: Negative for congestion and sore throat  Respiratory: Negative for cough, chest tightness and shortness of breath  Cardiovascular: Negative for chest pain, palpitations and leg swelling  Gastrointestinal: Negative for abdominal distention, abdominal pain, constipation, diarrhea, nausea and vomiting  Genitourinary: Negative for dysuria and frequency  Musculoskeletal: Negative for back pain and myalgias  Skin: Positive for rash and wound  Neurological: Negative for dizziness, weakness, numbness and headaches  Psychiatric/Behavioral: Negative for sleep disturbance  Home Medications: [facility] [place]  Geodon 60mg in AM and 80mg HS  Lipitor 80mg once daily  Metformin 1000mg bid  Travatan-z eydrops 1 to each eye in the morning  Alphagan P 0 15% to both eyes BID     Objective: Vitals: Blood pressure 122/78, pulse 79, temperature 98 6 °F (37 °C), temperature source Axillary, resp  rate 18, height 5' 7" (1 702 m), weight 71 8 kg (158 lb 6 4 oz), SpO2 98 %  ,Body mass index is 24 81 kg/m²  Intake/Output Summary (Last 24 hours) at 9/4/2020 0848  Last data filed at 9/4/2020 7068  Gross per 24 hour   Intake 1148 33 ml   Output 745 ml   Net 403 33 ml       Current Medications: Reviewed    Physical Exam:   Physical Exam  Constitutional:       Comments: dishelved   HENT:      Head: Normocephalic and atraumatic  Nose: Nose normal       Mouth/Throat:      Mouth: Mucous membranes are moist    Eyes:      Extraocular Movements: Extraocular movements intact  Pupils: Pupils are equal, round, and reactive to light  Neck:      Musculoskeletal: Neck supple  Cardiovascular:      Rate and Rhythm: Normal rate and regular rhythm  Pulses: Normal pulses  Pulmonary:      Effort: Pulmonary effort is normal       Breath sounds: Normal breath sounds  Abdominal:      General: Abdomen is flat  Bowel sounds are normal  There is no distension  Musculoskeletal:      Comments: Multiple abrasions and bruising noted on bilateral lower extremities and upper lower extremity  Neurological:      Mental Status: He is alert and oriented to person, place, and time  Psychiatric:         Mood and Affect: Affect is labile  Speech: Speech is rapid and pressured  Judgment: Judgment is impulsive  Invasive Devices     Peripheral Intravenous Line            Peripheral IV 09/01/20 Left Antecubital 3 days          Drain            External Urinary Catheter Small 1 day                Lab, Imaging and other studies: I have personally reviewed pertinent reports  CT head 9/4/20: Improving intracranial hemorrhage  No shifts noted     No new bloodwork ordered

## 2020-09-04 NOTE — PHYSICAL THERAPY NOTE
Physical Therapy Progress Note     09/04/20 1407   Pain Assessment   Pain Assessment Tool Pain Assessment not indicated - pt denies pain   Restrictions/Precautions   RUE Weight Bearing Per Order WBAT   Other Precautions Hard of hearing;Cognitive; Chair Alarm; Bed Alarm; Fall Risk;Impulsive   Subjective   Subjective Pt encountered repeatedly requesting assist & setting off bed alarm  Session initiated with pt standing in room getting cleaned after being incontinent & using bathroom  Pt agreeable to ambulate  Pt is very [place], but does repsond well to visual and tactile cuse & communication in writing  Transfers   Sit to Stand 4  Minimal assistance   Additional items Assist x 1; Impulsive   Stand to Sit 4  Minimal assistance   Additional items Assist x 1; Increased time required; Impulsive   Stand pivot 4  Minimal assistance   Additional items Assist x 1; Increased time required; Impulsive   Ambulation/Elevation   Gait pattern Excessively slow; Short stride; Shuffling; Foward flexed;Decreased foot clearance; Improper Weight shift; Poor UE support   Gait Assistance 4  Minimal assist   Additional items Assist x 1   Assistive Device Rolling walker   Distance 120' x 2   Balance   Static Sitting Fair +   Static Standing Fair -   Ambulatory Poor +   Endurance Deficit   Endurance Deficit No   Activity Tolerance   Activity Tolerance Patient tolerated treatment well   Nurse Made Aware Shawna Lema RN   Assessment   Prognosis Good   Problem List Decreased skin integrity;Pain; Impaired hearing;Decreased mobility; Impaired balance;Decreased strength   Assessment pt continues to require min A for all mobiilty tasks due to impulsivity with transfers, lack of safety awareness, and poor motor planning when approaching obstacles  Pt did not demonstrate any LOB with activity, but did bump RW into wall & chair while in hallway  Increased assist given to prevent further incidents occasionally while returning to room    pt demonstrates poor approach to chair, attampting to sit before squaring up to seated surface, requiring assist to guide hips to chair  Correction difficult due to pt being hard of hearing  pt remained in recliner with all needs in reach & chair alarm active  Nursing staff aware of pt location & mobiilty status  Will continue to benefit from therapy services to improve strength, balance, safety awareness & activity tolerance to reduce need for assist with mobility tasks & ensure safe return to PLOF  Barriers to Discharge Decreased caregiver support   Goals   Patient Goals to rest   STG Expiration Date 09/10/20   PT Treatment Day 2   Plan   Treatment/Interventions Functional transfer training;LE strengthening/ROM; Therapeutic exercise; Endurance training;Cognitive reorientation;Patient/family training;Equipment eval/education; Bed mobility;Gait training   Progress Progressing toward goals   PT Frequency   (3-6x/week)   Recommendation   PT Discharge Recommendation Post-Acute Rehabilitation Services   Equipment Recommended [place], [place]

## 2020-09-04 NOTE — TELEPHONE ENCOUNTER
9/4/2020- CT Head completed today - Per Anish Padilla, keep appointment for Tuesday - no new imaging will be required  If patient is still inpt Tuesday will be added to the list   If patient D/C over weekend, keep appointment, can be made virtual if necessary

## 2020-09-04 NOTE — ASSESSMENT & PLAN NOTE
- Acute encephalopathy on admission, likely multifactorial in setting of traumatic brain injury, multiple wounds with rhabdomyolysis and acute kidney injury, sepsis  Now significantly improved  - Blood cultures x2 with 1 of the tube growing coagulase-negative Staphylococcus and Pantoea agglomerans  Patient was started on Ancef based on sensitivities  - Per ID, initial cultures likely a contaminant  Repeat cultures x2 negative at 48hrs, patient received 7 days abx, will discontinue   - Inpatient wound care evaluation and recommendations appreciated  - Delirium precautions

## 2020-09-04 NOTE — PLAN OF CARE
Problem: PHYSICAL THERAPY ADULT  Goal: Performs mobility at highest level of function for planned discharge setting  See evaluation for individualized goals  Description: Treatment/Interventions: Functional transfer training, LE strengthening/ROM, Therapeutic exercise, Endurance training, Patient/family training, Equipment eval/education, Bed mobility, Gait training, Spoke to nursing, OT  Equipment Recommended: Maday Ruff       See flowsheet documentation for full assessment, interventions and recommendations  Outcome: Progressing  Note: Prognosis: Good  Problem List: Decreased skin integrity, Pain, Impaired hearing, Decreased mobility, Impaired balance, Decreased strength  Assessment: pt continues to require min A for all mobiilty tasks due to impulsivity with transfers, lack of safety awareness, and poor motor planning when approaching obstacles  Pt did not demonstrate any LOB with activity, but did bump RW into wall & chair while in hallway  Increased assist given to prevent further incidents occasionally while returning to room  pt demonstrates poor approach to chair, attampting to sit before squaring up to seated surface, requiring assist to guide hips to chair  Correction difficult due to pt being hard of hearing  pt remained in recliner with all needs in reach & chair alarm active  Nursing staff aware of pt location & mobiilty status  Will continue to benefit from therapy services to improve strength, balance, safety awareness & activity tolerance to reduce need for assist with mobility tasks & ensure safe return to PLOF  Barriers to Discharge: Decreased caregiver support     PT Discharge Recommendation: Post-Acute Rehabilitation Services     PT - OK to Discharge: Yes    See flowsheet documentation for full assessment

## 2020-09-04 NOTE — PLAN OF CARE
Problem: Potential for Falls  Goal: Patient will remain free of falls  Description: INTERVENTIONS:  - Assess patient frequently for physical needs  -  Identify cognitive and physical deficits and behaviors that affect risk of falls    -  [place] fall precautions as indicated by assessment   - Educate patient/family on patient safety including physical limitations  - Instruct patient to call for assistance with activity based on assessment  - Modify environment to reduce risk of injury  - Consider OT/PT consult to assist with strengthening/mobility  Outcome: Progressing     Problem: PAIN - ADULT  Goal: Verbalizes/displays adequate comfort level or baseline comfort level  Description: Interventions:  - Encourage patient to monitor pain and request assistance  - Assess pain using appropriate pain scale  - Administer analgesics based on type and severity of pain and evaluate response  - Implement non-pharmacological measures as appropriate and evaluate response  - Consider cultural and social influences on pain and pain management  - Notify physician/advanced practitioner if interventions unsuccessful or patient reports new pain  Outcome: Progressing     Problem: INFECTION - ADULT  Goal: Absence or prevention of progression during hospitalization  Description: INTERVENTIONS:  - Assess and monitor for signs and symptoms of infection  - Monitor lab/diagnostic results  - Monitor all insertion sites, i e  indwelling lines, tubes, and drains  - Monitor endotracheal if appropriate and nasal secretions for changes in amount and color  - [place] appropriate cooling/warming therapies per order  - Administer medications as ordered  - Instruct and encourage patient and family to use good hand hygiene technique  - Identify and instruct in appropriate isolation precautions for identified infection/condition  Outcome: Progressing     Problem: SAFETY ADULT  Goal: Patient will remain free of falls  Description: INTERVENTIONS:  - Assess patient frequently for physical needs  -  Identify cognitive and physical deficits and behaviors that affect risk of falls    -  [place] fall precautions as indicated by assessment   - Educate patient/family on patient safety including physical limitations  - Instruct patient to call for assistance with activity based on assessment  - Modify environment to reduce risk of injury  - Consider OT/PT consult to assist with strengthening/mobility  Outcome: Progressing  Goal: Maintain or return to baseline ADL function  Description: INTERVENTIONS:  -  Assess patient's ability to carry out ADLs; assess patient's baseline for ADL function and identify physical deficits which impact ability to perform ADLs (bathing, care of mouth/teeth, toileting, grooming, dressing, etc )  - Assess/evaluate cause of self-care deficits   - Assess range of motion  - Assess patient's mobility; develop plan if impaired  - Assess patient's need for assistive devices and provide as appropriate  - Encourage maximum independence but intervene and supervise when necessary  - Involve family in performance of ADLs  - Assess for home care needs following discharge   - Consider OT consult to assist with ADL evaluation and planning for discharge  - Provide patient education as appropriate  Outcome: Progressing  Goal: Maintain or return mobility status to optimal level  Description: INTERVENTIONS:  - Assess patient's baseline mobility status (ambulation, transfers, stairs, etc )    - Identify cognitive and physical deficits and behaviors that affect mobility  - Identify mobility aids required to assist with transfers and/or ambulation (gait belt, sit-to-stand, lift, walker, cane, etc )  - [place] fall precautions as indicated by assessment  - Record patient progress and toleration of activity level on Mobility SBAR; progress patient to next Phase/Stage  - Instruct patient to call for assistance with activity based on assessment  - Consider rehabilitation consult to assist with strengthening/weightbearing, etc   Outcome: Progressing     Problem: DISCHARGE PLANNING  Goal: Discharge to home or other facility with appropriate resources  Description: INTERVENTIONS:  - Identify barriers to discharge w/patient and caregiver  - Arrange for needed discharge resources and transportation as appropriate  - Identify discharge learning needs (meds, wound care, etc )  - Arrange for interpretive services to assist at discharge as needed  - Refer to Case Management Department for coordinating discharge planning if the patient needs post-hospital services based on physician/advanced practitioner order or complex needs related to functional status, cognitive ability, or social support system  Outcome: Progressing     Problem: Knowledge Deficit  Goal: Patient/family/caregiver demonstrates understanding of disease process, treatment plan, medications, and discharge instructions  Description: Complete learning assessment and assess knowledge base    Interventions:  - Provide teaching at level of understanding  - Provide teaching via preferred learning methods  Outcome: Progressing     Problem: NEUROSENSORY - ADULT  Goal: Achieves stable or improved neurological status  Description: INTERVENTIONS  - Monitor and report changes in neurological status  - Monitor vital signs such as temperature, blood pressure, glucose, and any other labs ordered   - Initiate measures to prevent increased intracranial pressure  - Monitor for seizure activity and implement precautions if appropriate      Outcome: Progressing  Goal: Remains free of injury related to seizures activity  Description: INTERVENTIONS  - Maintain airway, patient safety  and administer oxygen as ordered  - Monitor patient for seizure activity, document and report duration and description of seizure to physician/advanced practitioner  - If seizure occurs,  ensure patient safety during seizure  - Reorient patient post seizure  - Seizure pads on all 4 side rails  - Instruct patient/family to notify RN of any seizure activity including if an aura is experienced  - Instruct patient/family to call for assistance with activity based on nursing assessment  - Administer anti-seizure medications if ordered    Outcome: Progressing  Goal: Achieves maximal functionality and self care  Description: INTERVENTIONS  - Monitor swallowing and airway patency with patient fatigue and changes in neurological status  - Encourage and assist patient to increase activity and self care     - Encourage visually impaired, hearing impaired and aphasic patients to use assistive/communication devices  Outcome: Progressing     Problem: SKIN/TISSUE INTEGRITY - ADULT  Goal: Skin integrity remains intact  Description: INTERVENTIONS  - Identify patients at risk for skin breakdown  - Assess and monitor skin integrity  - Assess and monitor nutrition and hydration status  - Monitor labs (i e  albumin)  - Assess for incontinence   - Turn and reposition patient  - Assist with mobility/ambulation  - Relieve pressure over bony prominences  - Avoid friction and shearing  - Provide appropriate hygiene as needed including keeping skin clean and dry  - Evaluate need for skin moisturizer/barrier cream  - Collaborate with interdisciplinary team (i e  Nutrition, Rehabilitation, etc )   - Patient/family teaching  Outcome: Progressing  Goal: Incision(s), wounds(s) or drain site(s) healing without S/S of infection  Description: INTERVENTIONS  - Assess and document risk factors for skin impairment   - Assess and document dressing, incision, wound bed, drain sites and surrounding tissue  - Consider nutrition services referral as needed  - Oral mucous membranes remain intact  - Provide patient/ family education  Outcome: Progressing  Goal: Oral mucous membranes remain intact  Description: INTERVENTIONS  - Assess oral mucosa and hygiene practices  - Implement preventative oral hygiene regimen  - Implement oral medicated treatments as ordered  - Initiate Nutrition services referral as needed  Outcome: Progressing     Problem: HEMATOLOGIC - ADULT  Goal: Maintains hematologic stability  Description: INTERVENTIONS  - Assess for signs and symptoms of bleeding or hemorrhage  - Monitor labs  - Administer supportive blood products/factors as ordered and appropriate  Outcome: Progressing     Problem: Prexisting or High Potential for Compromised Skin Integrity  Goal: Skin integrity is maintained or improved  Description: INTERVENTIONS:  - Identify patients at risk for skin breakdown  - Assess and monitor skin integrity  - Assess and monitor nutrition and hydration status  - Monitor labs   - Assess for incontinence   - Turn and reposition patient  - Assist with mobility/ambulation  - Relieve pressure over bony prominences  - Avoid friction and shearing  - Provide appropriate hygiene as needed including keeping skin clean and dry  - Evaluate need for skin moisturizer/barrier cream  - Collaborate with interdisciplinary team   - Patient/family teaching  - Consider wound care consult   Outcome: Progressing     Problem: SAFETY,RESTRAINT: NV/NON-SELF DESTRUCTIVE BEHAVIOR  Goal: Remains free of harm/injury (restraint for non violent/non self-detsructive behavior)  Description: INTERVENTIONS:  - Instruct patient/family regarding restraint use   - Assess and monitor physiologic and psychological status   - Provide interventions and comfort measures to meet assessed patient needs   - Identify and implement measures to help patient regain control  - Assess readiness for release of restraint   Outcome: Progressing  Goal: Returns to optimal restraint-free functioning  Description: INTERVENTIONS:  - Assess the patient's behavior and symptoms that indicate continued need for restraint  - Identify and implement measures to help patient regain control  - Assess readiness for release of restraint   Outcome: Progressing Problem: COPING  Goal: Pt/Family able to verbalize concerns and demonstrate effective coping strategies  Description: INTERVENTIONS:  - Assist patient/family to identify coping skills, available support systems and cultural and spiritual values  - Provide emotional support, including active listening and acknowledgement of concerns of patient and caregivers  - Reduce environmental stimuli, as able  - Provide patient education  - Assess for spiritual pain/suffering and initiate spiritual care, including notification of Pastoral Care or afshan based community as needed  - Assess effectiveness of coping strategies  Outcome: Progressing     Problem: DECISION MAKING  Goal: Pt/Family able to effectively weigh alternatives and participate in decision making related to treatment and care  Description: INTERVENTIONS:  - Identify decision maker  - Determine when there are differences among patient's view, family's view, and healthcare provider's view of patient condition and care goals  - Facilitate patient/family articulation of goals for care  - Help patient/family identify pros/cons of alternative solutions  - Provide information as requested by patient/family  - Respect patient/family rights related to privacy and sharing information   - Serve as a liaison between patient, family and health care team  - Initiate consults as appropriate (Ethics Team, Palliative Care, Family Care Conference, etc )  Outcome: Progressing     Problem: BEHAVIOR  Goal: Pt/Family maintain appropriate behavior and adhere to behavioral management agreement, if implemented  Description: INTERVENTIONS:  - Assess the family dynamic   - Encourage verbalization of thoughts and concerns in a socially appropriate manner  - Assess patient/family's coping skills and non-compliant behavior (including use of illegal substances)    - Utilize positive, consistent limit setting strategies supporting safety of patient, staff and others  - Initiate consult with Case Management, Spiritual Care or other ancillary services as appropriate  - If a patient's/visitor's behavior jeopardizes the safety of the patient, staff, or others, refer to organization procedure  - Notify Security of behavior or suspected illegal substances which indicate the need for search of the patient and/or belongings  - Encourage participation in the decision making process about a behavioral management agreement; implement if patient meets criteria  Outcome: Progressing     Problem: Nutrition/Hydration-ADULT  Goal: Nutrient/Hydration intake appropriate for improving, restoring or maintaining nutritional needs  Description: Monitor and assess patient's nutrition/hydration status for malnutrition  Collaborate with interdisciplinary team and initiate plan and interventions as ordered  Monitor patient's weight and dietary intake as ordered or per policy  Utilize nutrition screening tool and intervene as necessary  Determine patient's food preferences and provide high-protein, high-caloric foods as appropriate       INTERVENTIONS:  - Monitor oral intake, urinary output, labs, and treatment plans  - Assess nutrition and hydration status and recommend course of action  - Evaluate amount of meals eaten  - Assist patient with eating if necessary   - Allow adequate time for meals  - Recommend/ encourage appropriate diets, oral nutritional supplements, and vitamin/mineral supplements  - Order, calculate, and assess calorie counts as needed  - Recommend, monitor, and adjust tube feedings and TPN/PPN based on assessed needs  - Assess need for intravenous fluids  - Provide specific nutrition/hydration education as appropriate  - Include patient/family/caregiver in decisions related to nutrition  Outcome: Progressing

## 2020-09-04 NOTE — PROGRESS NOTES
Progress Note - Infectious Disease   Patsy Cho  66 y o  male MRN: [id number]  Unit/Bed#: [facility] 603-01 Encounter: [id number]      Impression/Plan:  1  Systemic inflammatory response syndrome-tachycardia and leukocytosis  Suspect secondary to traumatic event with rhabdomyolysis and acute kidney injury with dehydration  Consideration for the possibility of sepsis in the setting of the positive blood cultures  However the positive blood cultures may represent a contaminant  Regardless the patient has received a one-week course of intravenous antibiotics and the follow-up blood cultures are negative  The leukocytosis has nearly resolved  The patient has remained afebrile and overall seems to have improved  -no additional antibiotic status post a 7 day course for possible bacteremia  -monitor CBC with diff and BMP  -follow up repeat blood cultures  -monitor temperature  -no additional workup or treatment needed     2  Polymicrobial bacteremia-with only 1 of 2 sets positive this could represent a contaminant  No definitive primary source was found  Regardless, the patient has received a full 7 day course of intravenous antibiotics and the follow-up blood cultures are negative  Suspect the patient has had an adequate treatment course if this is a true bacteremia   -no additional antibiotics  -follow up repeat blood cultures  -no additional ID workup needed     3  Acute encephalopathy-in the setting of traumatic brain injury and baseline dementia  Likely multifactorial including the traumatic brain injury, metabolic derangements, and possible infection  Doubt any primary CNS infection  Patient's cognition has apparently improved since admission  -monitor cognition  -continue treatment of metabolic issues  -no additional ID workup needed     4  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    Clinically and radiographically stabilized without need for any surgical intervention  -neurosurgical follow-up as needed  -monitor cognition as above     5  Traumatic rhabdomyolysis-in the setting of a patient being found down  The CPK has trended downward   -keep hydrated  -monitor CPK     Have discussed the above management plan with the primary service    No active infectious disease issues  We will sign off  Please call if questions  Antibiotics:  None status post 7 days of intravenous antibiotics    Subjective:  Patient has no fever, chills, sweats; no nausea, vomiting, diarrhea; no cough, shortness of breath; no pain  No new symptoms  He is very hard of hearing    Objective:  Vitals:  Temp:  [97 4 °F (36 3 °C)-98 6 °F (37 °C)] 98 6 °F (37 °C)  HR:  [] 79  Resp:  [18] 18  BP: (116-147)/(73-88) 122/78  SpO2:  [95 %-98 %] 98 %  Temp (24hrs), Av 1 °F (36 7 °C), Min:97 4 °F (36 3 °C), Max:98 6 °F (37 °C)  Current: Temperature: 98 6 °F (37 °C)    Physical Exam:   General Appearance:  Alert, interactive, nontoxic, no acute distress  Throat: Oropharynx moist without lesions  Lungs:   Clear to auscultation bilaterally; no wheezes, rhonchi or rales; respirations unlabored   Heart:  RRR; no murmur, rub or gallop   Abdomen:   Soft, non-tender, non-distended, positive bowel sounds  Extremities: No clubbing, cyanosis or edema   Skin: No new rashes or lesions  Numerous wounds dressed unchanged         Labs, Imaging, & Other studies:   All pertinent labs and imaging studies were personally reviewed  Results from last 7 days   Lab Units 20  1003 20  0457 20  0757   WBC Thousand/uL 10 24* 13 47* 10 68*   HEMOGLOBIN g/dL 10 6* 10 4* 9 8*   PLATELETS Thousands/uL 294 276 204     Results from last 7 days   Lab Units 20  1004 20  0457 20  1001  20  0618   SODIUM mmol/L 142 142 144   < > 147*   POTASSIUM mmol/L 3 4* 3 3* 3 3*   < > 4 1   CHLORIDE mmol/L 106 107 110*   < > 115*   CO2 mmol/L 28 31 27   < > 22   BUN mg/dL 12 10 14   < > 31*   CREATININE mg/dL 0 61 0 54* 0 68   < > 0 68   EGFR ml/min/1 73sq m 96 101 92   < > 92   CALCIUM mg/dL 8 5 8 0* 7 7*   < > 8 2*   AST U/L  --   --   --   --  390*   ALT U/L  --   --   --   --  108*   ALK PHOS U/L  --   --   --   --  83    < > = values in this interval not displayed  Results from last 7 days   Lab Units 09/01/20  1412 08/28/20  1747   BLOOD CULTURE  No Growth at 48 hrs  No Growth at 48 hrs    --    MRSA CULTURE ONLY   --  No Methicillin Resistant Staphlyococcus aureus (MRSA) isolated

## 2020-09-04 NOTE — PROGRESS NOTES
Progress Note - Flory Nance 1945, 66 y o  male MRN: [id number]    Unit/Bed#: [facility] 603-01 Encounter: [id number]    Primary Care Provider: Anabel Palm MD   Date and time admitted to hospital: 8/28/2020 12:43 PM        Blood culture positive for microorganism  Assessment & Plan  - Blood cultures x2 with 1 of the tube growing coagulase-negative Staphylococcus and Pantoea agglomerans  Patient was started on Ancef based on sensitivities  - Repeat blood cultures from 09/01/2020 remain negative to date  - Patient without fevers over the last 24 hours  - antibiotics stopped by ID on 9/3      IVH (intraventricular hemorrhage) (HCC)  Assessment & Plan  - Please see outlined management under TBI diagnosis  Subdural hematoma (HCC)  Assessment & Plan  - Please see outlined management under TBI diagnosis  Subarachnoid hemorrhage ([address] )  Assessment & Plan  - Please see outlined management under TBI diagnosis  Type 2 diabetes mellitus, without long-term current use of insulin [facility])  Assessment & Plan    Lab Results   Component Value Date    HGBA1C 5 7 (H) 08/29/2020     - Documented history of type 2 diabetes mellitus with questionable use of metformin per only available medication list from February 2018  - Currently without hyperglycemia on initial lab workup and most recent hemoglobin A1c from March 2020 was 5 7  - Not on ISS, continue to monitor via routine labwork    Pressure injury of back, stage 1  Assessment & Plan  - Suspected stage I pressure injury of the back  - Inpatient Wound Care service evaluation and recommendations appreciated  - Continue with local wound care to multiple wound sites  Fall  Assessment & Plan  - Suspected unwitnessed fall with unknown loss of consciousness and the below noted injuries  - Fall precautions  - Geriatric Medicine evaluation and recommendations appreciated    - PT and OT evaluation and treatment  - Case Management consultation for disposition planning/expected post acute care facility need  Abrasions of multiple sites  Assessment & Plan  - Abrasions to multiple sites including all 4 extremities and his back  - Frequent repositioning, every 2 hours  - Inpatient Wound Care team following  Appreciate their recommendations  - Continue local wound care  Traumatic rhabdomyolysis ([facility] )  Assessment & Plan  - Traumatic rhabdomyolysis, present on admission, with associated NAVID   - Rhabdomyolysis resolving   - Trend CK on an as-needed basis going forward  - May saline lock IV   - Continue to encourage oral intake and adequate hydration  Encephalopathy acute  Assessment & Plan  - Acute encephalopathy on admission, likely multifactorial in setting of traumatic brain injury, multiple wounds with rhabdomyolysis and acute kidney injury, sepsis  Now significantly improved  - Blood cultures x2 with 1 of the tube growing coagulase-negative Staphylococcus and Pantoea agglomerans  Patient was started on Ancef based on sensitivities  - Per ID, initial cultures likely a contaminant  Repeat cultures x2 negative at 48hrs, patient received 7 days abx, will discontinue   - Inpatient wound care evaluation and recommendations appreciated  - Delirium precautions  Dementia [facility])  Assessment & Plan  - Chronic/baseline history of dementia  - Delirium precautions  - Geriatric Medicine evaluation and recommendations appreciated  - Continue current medication regimen   - Outpatient follow-up with PCP  * TBI (traumatic brain injury) ([facility] )  Assessment & Plan  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 as well as suspected trace subarachnoid hemorrhage in the left parietal region on initial CT scan    On repeat CT scan of head on 8/28/2020 following transferred to [facility], there appeared to be slight increase of the bilateral intraventricular hemorrhage, blossoming of the left parietal subarachnoid hemorrhage, bilateral subdural hematomas and suspected bleeding near the brainstem  - Repeat CT head on 8/30/2020 demonstrated improvement and/or stable hemorrhages  - Appreciate Neurosurgery evaluation and recommendations  No operative intervention recommended  - Repeat CTH 9/4: Interval improvement in the previously seen intracranial hemorrhage  No significant mass effect or midline shift  No new intracranial hemorrhage is seen  - Continue Keppra to complete a 7 day course, ends today  - Patient cleared for chemical DVT prophylaxis with subcutaneous heparin, all other anti-platelet and anticoagulant medications to be held until cleared by Neurosurgery  - Continue to monitor neurologic exam   - Continue PT and OT evaluation and treatment as indicated  - Outpatient follow-up with Neurosurgery  Disposition: inpatient, CM working on POA and placement      SUBJECTIVE:  Chief Complaint: no pain    Subjective: No acute events overnight, did NOT require posey belt  Afebrile, vitals stable         OBJECTIVE:     Meds/Allergies     Current Facility-Administered Medications:     acetaminophen (TYLENOL) tablet 650 mg, 650 mg, Oral, Q6H PRN, Micheal Macias PA-C    brimonidine (ALPHAGAN P) 0 15 % ophthalmic solution 1 drop, 1 drop, Both Eyes, Q8H [address], CHICHI RinaldiC, 1 drop at 09/04/20 0509    chlorhexidine (PERIDEX) 0 12 % oral rinse 15 mL, 15 mL, Swish & Spit, Q12H [address], CHICHI OliveraC, 15 mL at 09/03/20 2120    heparin (porcine) subcutaneous injection 5,000 Units, 5,000 Units, Subcutaneous, Q8H [address], Hood Hodgson PA-C, 5,000 Units at 09/04/20 0509    levETIRAcetam (KEPPRA) tablet 500 mg, 500 mg, Oral, Q12H [address], CHICHI OliveraC, 500 mg at 09/03/20 2120    lidocaine (LIDODERM) 5 % patch 1 patch, 1 patch, Topical, Daily, Micheal Macias PA-C, 1 patch at 09/02/20 1006    ondansetron (ZOFRAN) injection 4 mg, 4 mg, Intravenous, Q6H PRN, CHICHI OliveraC   travoprost (TRAVATAN-Z) 0 004 % ophthalmic solution 1 drop, 1 drop, Both Eyes, HS, Soledad Mcbride PA-C, 1 drop at 09/03/20 2127    ziprasidone (GEODON) capsule 60 mg, 60 mg, Oral, Daily, Robert Hubbard PA-C, 60 mg at 09/03/20 0804    ziprasidone (GEODON) capsule 80 mg, 80 mg, Oral, QPM, Robert Hubbard PA-C, 80 mg at 09/03/20 1736     Vitals:   Vitals:    09/04/20 0732   BP: 122/78   Pulse: 79   Resp: 18   Temp:    SpO2: 98%       Intake/Output:  I/O       09/02 0701 - 09/03 0700 09/03 0701 - 09/04 0700 09/04 0701 - 09/05 0700    P  O  1098 1010     IV Piggyback 50 138 3     Total Intake(mL/kg) 1148 (14 6) 1148 3 (16)     Urine (mL/kg/hr) 1725 (0 9) 420 (0 2) 225 (4 2)    Stool  0     Total Output 1725 420 225    Net -577 +728 3 -225           Unmeasured Urine Occurrence 10 x 6 x     Unmeasured Stool Occurrence  2 x            Nutrition/GI Proph/Bowel Reg: regular house    Physical Exam:   Physical Exam  Vitals signs reviewed  Constitutional:       General: He is not in acute distress  Appearance: He is normal weight  Comments: Sleeping, awakes to foot tap   HENT:      Head: Normocephalic and atraumatic  Nose: Nose normal    Eyes:      General: No scleral icterus  Right eye: No discharge  Left eye: No discharge  Cardiovascular:      Rate and Rhythm: Normal rate and regular rhythm  Pulmonary:      Effort: Pulmonary effort is normal  No respiratory distress  Breath sounds: Normal breath sounds  Abdominal:      General: There is no distension  Palpations: Abdomen is soft  Tenderness: There is no abdominal tenderness  Musculoskeletal:      Right lower leg: No edema  Left lower leg: No edema  Skin:     General: Skin is warm  Comments: Bandages to b/l shoulders, knee   Neurological:      General: No focal deficit present  Mental Status: He is alert  Mental status is at baseline        Comments: Moves all extremities, reads questions and answers yes/no         Invasive Devices     Peripheral Intravenous Line            Peripheral IV 09/01/20 Left Antecubital 3 days          Drain            External Urinary Catheter Small 1 day                 Lab Results:   BMP/CMP:   Lab Results   Component Value Date    SODIUM 142 09/03/2020    K 3 4 (L) 09/03/2020     09/03/2020    CO2 28 09/03/2020    BUN 12 09/03/2020    CREATININE 0 61 09/03/2020    CALCIUM 8 5 09/03/2020    EGFR 96 09/03/2020    and CBC:   Lab Results   Component Value Date    WBC 10 24 (H) 09/03/2020    HGB 10 6 (L) 09/03/2020    HCT 31 9 (L) 09/03/2020    MCV 92 09/03/2020     09/03/2020    MCH 30 6 09/03/2020    MCHC 33 2 09/03/2020    RDW 14 4 09/03/2020    MPV 9 4 09/03/2020     Imaging/EKG Studies: Results: I have personally reviewed pertinent reports      Other Studies: none  VTE Prophylaxis: Heparin

## 2020-09-04 NOTE — CASE MANAGEMENT
CM spoke to pt's friend Roselyn Rogers  Pt's POA is Jocelyne Hartman [phone]  Pt's friend confirmed she is POA as per the  who signed the POA documentation  CM called and left her a voicemail to talk about rehab options with her

## 2020-09-04 NOTE — ASSESSMENT & PLAN NOTE
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 as well as suspected trace subarachnoid hemorrhage in the left parietal region on initial CT scan  On repeat CT scan of head on 8/28/2020 following transferred to [facility], there appeared to be slight increase of the bilateral intraventricular hemorrhage, blossoming of the left parietal subarachnoid hemorrhage, bilateral subdural hematomas and suspected bleeding near the brainstem  - Repeat CT head on 8/30/2020 demonstrated improvement and/or stable hemorrhages  - Appreciate Neurosurgery evaluation and recommendations  No operative intervention recommended  - Repeat CTH 9/4: Interval improvement in the previously seen intracranial hemorrhage  No significant mass effect or midline shift  No new intracranial hemorrhage is seen  - Continue Keppra to complete a 7 day course, ends today  - Patient cleared for chemical DVT prophylaxis with subcutaneous heparin, all other anti-platelet and anticoagulant medications to be held until cleared by Neurosurgery  - Continue to monitor neurologic exam   - Continue PT and OT evaluation and treatment as indicated  - Outpatient follow-up with Neurosurgery

## 2020-09-04 NOTE — ASSESSMENT & PLAN NOTE
- Chronic/baseline history of dementia  - Delirium precautions  - Geriatric Medicine evaluation and recommendations appreciated  - Continue current medication regimen   - Outpatient follow-up with PCP

## 2020-09-05 PROCEDURE — NC001 PR NO CHARGE: Performed by: SURGERY

## 2020-09-05 RX ADMIN — LIDOCAINE 5% 1 PATCH: 700 PATCH TOPICAL at 10:07

## 2020-09-05 RX ADMIN — BRIMONIDINE TARTRATE 1 DROP: 1.5 SOLUTION OPHTHALMIC at 06:13

## 2020-09-05 RX ADMIN — CHLORHEXIDINE GLUCONATE 0.12% ORAL RINSE 15 ML: 1.2 LIQUID ORAL at 21:12

## 2020-09-05 RX ADMIN — ZIPRASIDONE HYDROCHLORIDE 60 MG: 20 CAPSULE ORAL at 10:07

## 2020-09-05 RX ADMIN — BRIMONIDINE TARTRATE 1 DROP: 1.5 SOLUTION OPHTHALMIC at 14:05

## 2020-09-05 RX ADMIN — HEPARIN SODIUM 5000 UNITS: 5000 INJECTION INTRAVENOUS; SUBCUTANEOUS at 14:05

## 2020-09-05 RX ADMIN — HEPARIN SODIUM 5000 UNITS: 5000 INJECTION INTRAVENOUS; SUBCUTANEOUS at 06:13

## 2020-09-05 RX ADMIN — TRAVOPROST 1 DROP: 0.04 SOLUTION/ DROPS OPHTHALMIC at 21:12

## 2020-09-05 RX ADMIN — BRIMONIDINE TARTRATE 1 DROP: 1.5 SOLUTION OPHTHALMIC at 21:12

## 2020-09-05 RX ADMIN — HEPARIN SODIUM 5000 UNITS: 5000 INJECTION INTRAVENOUS; SUBCUTANEOUS at 21:12

## 2020-09-05 RX ADMIN — ZIPRASIDONE HYDROCHLORIDE 80 MG: 40 CAPSULE ORAL at 17:28

## 2020-09-05 NOTE — PLAN OF CARE
Problem: Potential for Falls  Goal: Patient will remain free of falls  Description: INTERVENTIONS:  - Assess patient frequently for physical needs  -  Identify cognitive and physical deficits and behaviors that affect risk of falls    -  [place] fall precautions as indicated by assessment   - Educate patient/family on patient safety including physical limitations  - Instruct patient to call for assistance with activity based on assessment  - Modify environment to reduce risk of injury  - Consider OT/PT consult to assist with strengthening/mobility  Outcome: Progressing     Problem: PAIN - ADULT  Goal: Verbalizes/displays adequate comfort level or baseline comfort level  Description: Interventions:  - Encourage patient to monitor pain and request assistance  - Assess pain using appropriate pain scale  - Administer analgesics based on type and severity of pain and evaluate response  - Implement non-pharmacological measures as appropriate and evaluate response  - Consider cultural and social influences on pain and pain management  - Notify physician/advanced practitioner if interventions unsuccessful or patient reports new pain  Outcome: Progressing     Problem: INFECTION - ADULT  Goal: Absence or prevention of progression during hospitalization  Description: INTERVENTIONS:  - Assess and monitor for signs and symptoms of infection  - Monitor lab/diagnostic results  - Monitor all insertion sites, i e  indwelling lines, tubes, and drains  - Monitor endotracheal if appropriate and nasal secretions for changes in amount and color  - [place] appropriate cooling/warming therapies per order  - Administer medications as ordered  - Instruct and encourage patient and family to use good hand hygiene technique  - Identify and instruct in appropriate isolation precautions for identified infection/condition  Outcome: Progressing     Problem: SAFETY ADULT  Goal: Patient will remain free of falls  Description: INTERVENTIONS:  - Assess patient frequently for physical needs  -  Identify cognitive and physical deficits and behaviors that affect risk of falls    -  [place] fall precautions as indicated by assessment   - Educate patient/family on patient safety including physical limitations  - Instruct patient to call for assistance with activity based on assessment  - Modify environment to reduce risk of injury  - Consider OT/PT consult to assist with strengthening/mobility  Outcome: Progressing  Goal: Maintain or return to baseline ADL function  Description: INTERVENTIONS:  -  Assess patient's ability to carry out ADLs; assess patient's baseline for ADL function and identify physical deficits which impact ability to perform ADLs (bathing, care of mouth/teeth, toileting, grooming, dressing, etc )  - Assess/evaluate cause of self-care deficits   - Assess range of motion  - Assess patient's mobility; develop plan if impaired  - Assess patient's need for assistive devices and provide as appropriate  - Encourage maximum independence but intervene and supervise when necessary  - Involve family in performance of ADLs  - Assess for home care needs following discharge   - Consider OT consult to assist with ADL evaluation and planning for discharge  - Provide patient education as appropriate  Outcome: Progressing  Goal: Maintain or return mobility status to optimal level  Description: INTERVENTIONS:  - Assess patient's baseline mobility status (ambulation, transfers, stairs, etc )    - Identify cognitive and physical deficits and behaviors that affect mobility  - Identify mobility aids required to assist with transfers and/or ambulation (gait belt, sit-to-stand, lift, walker, cane, etc )  - [place] fall precautions as indicated by assessment  - Record patient progress and toleration of activity level on Mobility SBAR; progress patient to next Phase/Stage  - Instruct patient to call for assistance with activity based on assessment  - Consider rehabilitation consult to assist with strengthening/weightbearing, etc   Outcome: Progressing     Problem: DISCHARGE PLANNING  Goal: Discharge to home or other facility with appropriate resources  Description: INTERVENTIONS:  - Identify barriers to discharge w/patient and caregiver  - Arrange for needed discharge resources and transportation as appropriate  - Identify discharge learning needs (meds, wound care, etc )  - Arrange for interpretive services to assist at discharge as needed  - Refer to Case Management Department for coordinating discharge planning if the patient needs post-hospital services based on physician/advanced practitioner order or complex needs related to functional status, cognitive ability, or social support system  Outcome: Progressing     Problem: Knowledge Deficit  Goal: Patient/family/caregiver demonstrates understanding of disease process, treatment plan, medications, and discharge instructions  Description: Complete learning assessment and assess knowledge base    Interventions:  - Provide teaching at level of understanding  - Provide teaching via preferred learning methods  Outcome: Progressing     Problem: NEUROSENSORY - ADULT  Goal: Achieves stable or improved neurological status  Description: INTERVENTIONS  - Monitor and report changes in neurological status  - Monitor vital signs such as temperature, blood pressure, glucose, and any other labs ordered   - Initiate measures to prevent increased intracranial pressure  - Monitor for seizure activity and implement precautions if appropriate      Outcome: Progressing  Goal: Remains free of injury related to seizures activity  Description: INTERVENTIONS  - Maintain airway, patient safety  and administer oxygen as ordered  - Monitor patient for seizure activity, document and report duration and description of seizure to physician/advanced practitioner  - If seizure occurs,  ensure patient safety during seizure  - Reorient patient post seizure  - Seizure pads on all 4 side rails  - Instruct patient/family to notify RN of any seizure activity including if an aura is experienced  - Instruct patient/family to call for assistance with activity based on nursing assessment  - Administer anti-seizure medications if ordered    Outcome: Progressing  Goal: Achieves maximal functionality and self care  Description: INTERVENTIONS  - Monitor swallowing and airway patency with patient fatigue and changes in neurological status  - Encourage and assist patient to increase activity and self care     - Encourage visually impaired, hearing impaired and aphasic patients to use assistive/communication devices  Outcome: Progressing     Problem: SKIN/TISSUE INTEGRITY - ADULT  Goal: Skin integrity remains intact  Description: INTERVENTIONS  - Identify patients at risk for skin breakdown  - Assess and monitor skin integrity  - Assess and monitor nutrition and hydration status  - Monitor labs (i e  albumin)  - Assess for incontinence   - Turn and reposition patient  - Assist with mobility/ambulation  - Relieve pressure over bony prominences  - Avoid friction and shearing  - Provide appropriate hygiene as needed including keeping skin clean and dry  - Evaluate need for skin moisturizer/barrier cream  - Collaborate with interdisciplinary team (i e  Nutrition, Rehabilitation, etc )   - Patient/family teaching  Outcome: Progressing  Goal: Incision(s), wounds(s) or drain site(s) healing without S/S of infection  Description: INTERVENTIONS  - Assess and document risk factors for skin impairment   - Assess and document dressing, incision, wound bed, drain sites and surrounding tissue  - Consider nutrition services referral as needed  - Oral mucous membranes remain intact  - Provide patient/ family education  Outcome: Progressing  Goal: Oral mucous membranes remain intact  Description: INTERVENTIONS  - Assess oral mucosa and hygiene practices  - Implement preventative oral hygiene regimen  - Implement oral medicated treatments as ordered  - Initiate Nutrition services referral as needed  Outcome: Progressing     Problem: HEMATOLOGIC - ADULT  Goal: Maintains hematologic stability  Description: INTERVENTIONS  - Assess for signs and symptoms of bleeding or hemorrhage  - Monitor labs  - Administer supportive blood products/factors as ordered and appropriate  Outcome: Progressing     Problem: Prexisting or High Potential for Compromised Skin Integrity  Goal: Skin integrity is maintained or improved  Description: INTERVENTIONS:  - Identify patients at risk for skin breakdown  - Assess and monitor skin integrity  - Assess and monitor nutrition and hydration status  - Monitor labs   - Assess for incontinence   - Turn and reposition patient  - Assist with mobility/ambulation  - Relieve pressure over bony prominences  - Avoid friction and shearing  - Provide appropriate hygiene as needed including keeping skin clean and dry  - Evaluate need for skin moisturizer/barrier cream  - Collaborate with interdisciplinary team   - Patient/family teaching  - Consider wound care consult   Outcome: Progressing     Problem: SAFETY,RESTRAINT: NV/NON-SELF DESTRUCTIVE BEHAVIOR  Goal: Remains free of harm/injury (restraint for non violent/non self-detsructive behavior)  Description: INTERVENTIONS:  - Instruct patient/family regarding restraint use   - Assess and monitor physiologic and psychological status   - Provide interventions and comfort measures to meet assessed patient needs   - Identify and implement measures to help patient regain control  - Assess readiness for release of restraint   Outcome: Progressing  Goal: Returns to optimal restraint-free functioning  Description: INTERVENTIONS:  - Assess the patient's behavior and symptoms that indicate continued need for restraint  - Identify and implement measures to help patient regain control  - Assess readiness for release of restraint   Outcome: Progressing Problem: COPING  Goal: Pt/Family able to verbalize concerns and demonstrate effective coping strategies  Description: INTERVENTIONS:  - Assist patient/family to identify coping skills, available support systems and cultural and spiritual values  - Provide emotional support, including active listening and acknowledgement of concerns of patient and caregivers  - Reduce environmental stimuli, as able  - Provide patient education  - Assess for spiritual pain/suffering and initiate spiritual care, including notification of Pastoral Care or afshan based community as needed  - Assess effectiveness of coping strategies  Outcome: Progressing     Problem: DECISION MAKING  Goal: Pt/Family able to effectively weigh alternatives and participate in decision making related to treatment and care  Description: INTERVENTIONS:  - Identify decision maker  - Determine when there are differences among patient's view, family's view, and healthcare provider's view of patient condition and care goals  - Facilitate patient/family articulation of goals for care  - Help patient/family identify pros/cons of alternative solutions  - Provide information as requested by patient/family  - Respect patient/family rights related to privacy and sharing information   - Serve as a liaison between patient, family and health care team  - Initiate consults as appropriate (Ethics Team, Palliative Care, Family Care Conference, etc )  Outcome: Progressing     Problem: BEHAVIOR  Goal: Pt/Family maintain appropriate behavior and adhere to behavioral management agreement, if implemented  Description: INTERVENTIONS:  - Assess the family dynamic   - Encourage verbalization of thoughts and concerns in a socially appropriate manner  - Assess patient/family's coping skills and non-compliant behavior (including use of illegal substances)    - Utilize positive, consistent limit setting strategies supporting safety of patient, staff and others  - Initiate consult with Case Management, Spiritual Care or other ancillary services as appropriate  - If a patient's/visitor's behavior jeopardizes the safety of the patient, staff, or others, refer to organization procedure  - Notify Security of behavior or suspected illegal substances which indicate the need for search of the patient and/or belongings  - Encourage participation in the decision making process about a behavioral management agreement; implement if patient meets criteria  Outcome: Progressing     Problem: Nutrition/Hydration-ADULT  Goal: Nutrient/Hydration intake appropriate for improving, restoring or maintaining nutritional needs  Description: Monitor and assess patient's nutrition/hydration status for malnutrition  Collaborate with interdisciplinary team and initiate plan and interventions as ordered  Monitor patient's weight and dietary intake as ordered or per policy  Utilize nutrition screening tool and intervene as necessary  Determine patient's food preferences and provide high-protein, high-caloric foods as appropriate       INTERVENTIONS:  - Monitor oral intake, urinary output, labs, and treatment plans  - Assess nutrition and hydration status and recommend course of action  - Evaluate amount of meals eaten  - Assist patient with eating if necessary   - Allow adequate time for meals  - Recommend/ encourage appropriate diets, oral nutritional supplements, and vitamin/mineral supplements  - Order, calculate, and assess calorie counts as needed  - Recommend, monitor, and adjust tube feedings and TPN/PPN based on assessed needs  - Assess need for intravenous fluids  - Provide specific nutrition/hydration education as appropriate  - Include patient/family/caregiver in decisions related to nutrition  Outcome: Progressing

## 2020-09-05 NOTE — ASSESSMENT & PLAN NOTE
- Acute encephalopathy on admission, likely multifactorial in setting of traumatic brain injury, multiple wounds with rhabdomyolysis and acute kidney injury, sepsis  Now significantly improved  - Blood cultures x2 with 1 of the tubes growing coagulase-negative Staphylococcus and Pantoea agglomerans  Patient was started on Ancef based on sensitivities  - Per ID, initial cultures likely a contaminant  Repeat cultures x2 negative at 72hrs, patient received 7 days abx, discontinued 9/4   - Inpatient wound care evaluation and recommendations appreciated  - Delirium precautions

## 2020-09-05 NOTE — ASSESSMENT & PLAN NOTE
- Suspected unwitnessed fall with unknown loss of consciousness and the below noted injuries  - Fall precautions  - Geriatric Medicine evaluation and recommendations appreciated  - PT and OT evaluation and treatment  - Case Management consultation for disposition planning/expected post acute care facility need   Awaiting placement

## 2020-09-05 NOTE — ASSESSMENT & PLAN NOTE
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 as well as suspected trace subarachnoid hemorrhage in the left parietal region on initial CT scan  On repeat CT scan of head on 8/28/2020 following transferred to [facility], there appeared to be slight increase of the bilateral intraventricular hemorrhage, blossoming of the left parietal subarachnoid hemorrhage, bilateral subdural hematomas and suspected bleeding near the brainstem  - Repeat CT head on 8/30/2020 demonstrated improvement and/or stable hemorrhages  - Appreciate Neurosurgery evaluation and recommendations  No operative intervention recommended  - Repeat CTH 9/4: Interval improvement in the previously seen intracranial hemorrhage  No significant mass effect or midline shift  No new intracranial hemorrhage is seen  - Continue Keppra to complete a 7 day course, ended 9/4  - Patient cleared for chemical DVT prophylaxis with subcutaneous heparin, all other anti-platelet and anticoagulant medications to be held until cleared by Neurosurgery  - Continue to monitor neurologic exam   - Continue PT and OT evaluation and treatment as indicated  - Outpatient follow-up with Neurosurgery

## 2020-09-05 NOTE — PROGRESS NOTES
Progress Note - Cachorro Meyers 1945, 66 y o  male MRN: [id number]    [facility] Encounter: [id number]    Primary Care Provider: Kalin Servin MD   Date and time admitted to hospital: 8/28/2020 12:43 PM      Blood culture positive for microorganism  Assessment & Plan  - Blood cultures x2 with 1 of the tube growing coagulase-negative Staphylococcus and Pantoea agglomerans  Patient was started on Ancef based on sensitivities  - Repeat blood cultures from 09/01/2020 remain negative to date  - Patient without fevers over the last 24 hours  - antibiotics stopped by ID on 9/3      IVH (intraventricular hemorrhage) (HCC)  Assessment & Plan  - Please see outlined management under TBI diagnosis  Subdural hematoma (HCC)  Assessment & Plan  - Please see outlined management under TBI diagnosis  Subarachnoid hemorrhage ([facility] [address] )  Assessment & Plan  - Please see outlined management under TBI diagnosis  Type 2 diabetes mellitus, without long-term current use of insulin [facility])  Assessment & Plan    Lab Results   Component Value Date    HGBA1C 5 7 (H) 08/29/2020     - Documented history of type 2 diabetes mellitus with questionable use of metformin per only available medication list from February 2018  - Currently without hyperglycemia on initial lab workup and most recent hemoglobin A1c from March 2020 was 5 7  - Not on ISS, continue to monitor via routine labwork    Pressure injury of back, stage 1  Assessment & Plan  - Suspected stage I pressure injury of the back  - Inpatient Wound Care service evaluation and recommendations appreciated  - Continue with local wound care to multiple wound sites  Fall  Assessment & Plan  - Suspected unwitnessed fall with unknown loss of consciousness and the below noted injuries  - Fall precautions  - Geriatric Medicine evaluation and recommendations appreciated    - PT and OT evaluation and treatment  - Case Management consultation for disposition planning/expected post acute care facility need  Awaiting placement    Abrasions of multiple sites  Assessment & Plan  - Abrasions to multiple sites including all 4 extremities and his back  - Frequent repositioning, every 2 hours  - Inpatient Wound Care team following  Appreciate their recommendations  - Continue local wound care  [facility]  Assessment & Plan  - Traumatic rhabdomyolysis, present on admission, with associated NAVID   - Rhabdomyolysis resolving   - Trend CK on an as-needed basis going forward  - May saline lock IV   - Continue to encourage oral intake and adequate hydration  Encephalopathy acute  Assessment & Plan  - Acute encephalopathy on admission, likely multifactorial in setting of traumatic brain injury, multiple wounds with rhabdomyolysis and acute kidney injury, sepsis  Now significantly improved  - Blood cultures x2 with 1 of the tubes growing coagulase-negative Staphylococcus and Pantoea agglomerans  Patient was started on Ancef based on sensitivities  - Per ID, initial cultures likely a contaminant  Repeat cultures x2 negative at 72hrs, patient received 7 days abx, discontinued 9/4   - Inpatient wound care evaluation and recommendations appreciated  - Delirium precautions  Dementia [facility])  Assessment & Plan  - Chronic/baseline history of dementia  - Delirium precautions  - Geriatric Medicine evaluation and recommendations appreciated  - Continue current medication regimen   - Outpatient follow-up with PCP  * TBI (traumatic brain injury) ([facility] )  Assessment & Plan  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 as well as suspected trace subarachnoid hemorrhage in the left parietal region on initial CT scan    On repeat CT scan of head on 8/28/2020 following transferred to [facility], there appeared to be slight increase of the bilateral intraventricular hemorrhage, blossoming of the left parietal subarachnoid hemorrhage, bilateral subdural hematomas and suspected bleeding near the brainstem  - Repeat CT head on 8/30/2020 demonstrated improvement and/or stable hemorrhages  - Appreciate Neurosurgery evaluation and recommendations  No operative intervention recommended  - Repeat CTH 9/4: Interval improvement in the previously seen intracranial hemorrhage  No significant mass effect or midline shift  No new intracranial hemorrhage is seen  - Continue Keppra to complete a 7 day course, ended 9/4  - Patient cleared for chemical DVT prophylaxis with subcutaneous heparin, all other anti-platelet and anticoagulant medications to be held until cleared by Neurosurgery  - Continue to monitor neurologic exam   - Continue PT and OT evaluation and treatment as indicated  - Outpatient follow-up with Neurosurgery  Disposition: Awaiting post acute rehab placement      SUBJECTIVE:  Chief Complaint: TBI, SAH, SDH, IVH, Traumatic Rhabdomyolysis    Subjective:  No acute events overnight, no new complaints        OBJECTIVE:     Meds/Allergies     Current Facility-Administered Medications:     acetaminophen (TYLENOL) tablet 650 mg, 650 mg, Oral, Q6H PRN, Dalton Donohue PA-C    brimonidine (ALPHAGAN P) 0 15 % ophthalmic solution 1 drop, 1 drop, Both Eyes, Q8H [facility], [place] MEHRAN aSlinas, 1 drop at 09/04/20 2205    chlorhexidine (PERIDEX) 0 12 % oral rinse 15 mL, 15 mL, Swish & Spit, Q12H [facility], Dalton Donohue PA-C, 15 mL at 09/04/20 2205    heparin (porcine) subcutaneous injection 5,000 Units, 5,000 Units, Subcutaneous, Q8H [facility], [place] MEHRAN [place], 5,000 Units at 09/04/20 2205    lidocaine (LIDODERM) 5 % patch 1 patch, 1 patch, Topical, Daily, Dalton Donohue PA-C, 1 patch at 09/04/20 0916    ondansetron (ZOFRAN) injection 4 mg, 4 mg, Intravenous, Q6H PRN, Dalton Donohue PA-C    travoprost (TRAVATAN-Z) 0 004 % ophthalmic solution 1 drop, 1 drop, Both Eyes, Soledad STREETER MEHRAN Mcbride, 1 drop at 09/04/20 2205    ziprasidone (GEODON) capsule 60 mg, 60 mg, Oral, Daily, Sarah Beth Marcelina PA-C, 60 mg at 09/04/20 0919    ziprasidone (GEODON) capsule 80 mg, 80 mg, Oral, QPM, Sarah Beth Marcelina PA-C, 80 mg at 09/04/20 1812     Vitals:   Vitals:    09/04/20 2313   BP: 135/84   Pulse: 98   Resp:    Temp:    SpO2: 98%       Intake/Output:  I/O       09/03 0701 - 09/04 0700 09/04 0701 - 09/05 0700    P  O  1010 802    IV Piggyback 138 3     Total Intake(mL/kg) 1148 3 (16) 802 (11 2)    Urine (mL/kg/hr) 420 (0 2) 325 (0 2)    Stool 0 0    Total Output 420 325    Net +728 3 +477          Unmeasured Urine Occurrence 6 x 1 x    Unmeasured Stool Occurrence 2 x 1 x           Nutrition/GI Proph/Bowel Reg:  Regular diet, no bowel regimen    Physical Exam:   General: AAO x 3, NAD, able to read questions and give yes or no answers  HEENT: Normocephalic, atraumatic, conjunctiva normal, EOMI, PERRLA  Neck: No JVD, No LAD  Cardio: RRR  Resp: NWB on RA  Abd: Soft, nontender, nondistended, No guarding, no rebound  Neuro: Sensation and motor intact x 4 limbs  Extremities: WWP, No edema, Bandages to bilateral shoulders, knee  Skin: Warm and dry      Invasive Devices     Peripheral Intravenous Line            Peripheral IV 09/01/20 Left Antecubital 3 days          Drain            External Urinary Catheter Small 2 days                 Lab Results: BMP/CMP: No results found for: SODIUM, K, CL, CO2, ANIONGAP, BUN, CREATININE, GLUCOSE, CALCIUM, AST, ALT, ALKPHOS, PROT, BILITOT, EGFR, CBC: No results found for: WBC, HGB, HCT, MCV, PLT, ADJUSTEDWBC, MCH, MCHC, RDW, MPV, NRBC and Coagulation: No results found for: PT, INR, APTT  Imaging/EKG Studies: CT Scan Head: 9/4 Interval Improvement in the previously seen intracranial hemorrhage    Other Studies: None  VTE Prophylaxis: Heparin

## 2020-09-06 LAB
ANION GAP SERPL CALCULATED.3IONS-SCNC: 5 MMOL/L (ref 4–13)
BACTERIA BLD CULT: NORMAL
BACTERIA BLD CULT: NORMAL
BUN SERPL-MCNC: 17 MG/DL (ref 5–25)
CALCIUM SERPL-MCNC: 8.9 MG/DL (ref 8.3–10.1)
CHLORIDE SERPL-SCNC: 107 MMOL/L (ref 100–108)
CO2 SERPL-SCNC: 28 MMOL/L (ref 21–32)
CREAT SERPL-MCNC: 0.74 MG/DL (ref 0.6–1.3)
ERYTHROCYTE [DISTWIDTH] IN BLOOD BY AUTOMATED COUNT: 14.7 % (ref 11.6–15.1)
GFR SERPL CREATININE-BSD FRML MDRD: 89 ML/MIN/1.73SQ M
GLUCOSE SERPL-MCNC: 140 MG/DL (ref 65–140)
HCT VFR BLD AUTO: 36.1 % (ref 36.5–49.3)
HGB BLD-MCNC: 11.9 G/DL (ref 12–17)
MCH RBC QN AUTO: 30.9 PG (ref 26.8–34.3)
MCHC RBC AUTO-ENTMCNC: 33 G/DL (ref 31.4–37.4)
MCV RBC AUTO: 94 FL (ref 82–98)
PLATELET # BLD AUTO: 392 THOUSANDS/UL (ref 149–390)
PMV BLD AUTO: 9.2 FL (ref 8.9–12.7)
POTASSIUM SERPL-SCNC: 4 MMOL/L (ref 3.5–5.3)
RBC # BLD AUTO: 3.85 MILLION/UL (ref 3.88–5.62)
SODIUM SERPL-SCNC: 140 MMOL/L (ref 136–145)
WBC # BLD AUTO: 15.08 THOUSAND/UL (ref 4.31–10.16)

## 2020-09-06 PROCEDURE — 99232 SBSQ HOSP IP/OBS MODERATE 35: CPT | Performed by: SURGERY

## 2020-09-06 PROCEDURE — 85027 COMPLETE CBC AUTOMATED: CPT | Performed by: PHYSICIAN ASSISTANT

## 2020-09-06 PROCEDURE — 97535 SELF CARE MNGMENT TRAINING: CPT

## 2020-09-06 PROCEDURE — 80048 BASIC METABOLIC PNL TOTAL CA: CPT | Performed by: PHYSICIAN ASSISTANT

## 2020-09-06 RX ADMIN — HEPARIN SODIUM 5000 UNITS: 5000 INJECTION INTRAVENOUS; SUBCUTANEOUS at 06:01

## 2020-09-06 RX ADMIN — ZIPRASIDONE HYDROCHLORIDE 80 MG: 40 CAPSULE ORAL at 18:00

## 2020-09-06 RX ADMIN — LIDOCAINE 5% 1 PATCH: 700 PATCH TOPICAL at 09:24

## 2020-09-06 RX ADMIN — BRIMONIDINE TARTRATE 1 DROP: 1.5 SOLUTION OPHTHALMIC at 06:00

## 2020-09-06 RX ADMIN — TRAVOPROST 1 DROP: 0.04 SOLUTION/ DROPS OPHTHALMIC at 22:45

## 2020-09-06 RX ADMIN — HEPARIN SODIUM 5000 UNITS: 5000 INJECTION INTRAVENOUS; SUBCUTANEOUS at 22:45

## 2020-09-06 RX ADMIN — ZIPRASIDONE HYDROCHLORIDE 60 MG: 20 CAPSULE ORAL at 09:24

## 2020-09-06 RX ADMIN — CHLORHEXIDINE GLUCONATE 0.12% ORAL RINSE 15 ML: 1.2 LIQUID ORAL at 22:45

## 2020-09-06 RX ADMIN — HEPARIN SODIUM 5000 UNITS: 5000 INJECTION INTRAVENOUS; SUBCUTANEOUS at 15:06

## 2020-09-06 RX ADMIN — BRIMONIDINE TARTRATE 1 DROP: 1.5 SOLUTION OPHTHALMIC at 15:06

## 2020-09-06 RX ADMIN — BRIMONIDINE TARTRATE 1 DROP: 1.5 SOLUTION OPHTHALMIC at 22:45

## 2020-09-06 RX ADMIN — CHLORHEXIDINE GLUCONATE 0.12% ORAL RINSE 15 ML: 1.2 LIQUID ORAL at 09:24

## 2020-09-06 NOTE — PROGRESS NOTES
Progress Note - Bradsunny Bloodgood 1945, 66 y o  male MRN: [id number]    Unit/Bed#: [facility] 603-01 Encounter: [id number]    Primary Care Provider: Mercedes Ralph MD   Date and time admitted to hospital: 8/28/2020 12:43 PM      Blood culture positive for microorganism  Assessment & Plan  - Blood cultures x2 with 1 of the tube growing coagulase-negative Staphylococcus and Pantoea agglomerans  Patient was started on Ancef based on sensitivities  - Repeat blood cultures from 09/01/2020 remain negative to date  - Patient without fevers over the last 24 hours  - antibiotics stopped by ID on 9/3      IVH (intraventricular hemorrhage) (HCC)  Assessment & Plan  - Please see outlined management under TBI diagnosis  Subdural hematoma (HCC)  Assessment & Plan  - Please see outlined management under TBI diagnosis  Subarachnoid hemorrhage ([facility] [address] )  Assessment & Plan  - Please see outlined management under TBI diagnosis  Type 2 diabetes mellitus, without long-term current use of insulin [facility])  Assessment & Plan    Lab Results   Component Value Date    HGBA1C 5 7 (H) 08/29/2020     - Documented history of type 2 diabetes mellitus with questionable use of metformin per only available medication list from February 2018  - Currently without hyperglycemia on initial lab workup and most recent hemoglobin A1c from March 2020 was 5 7  - Not on ISS, continue to monitor via routine labwork    Pressure injury of back, stage 1  Assessment & Plan  - Suspected stage I pressure injury of the back  - Inpatient Wound Care service evaluation and recommendations appreciated  - Continue with local wound care to multiple wound sites  Fall  Assessment & Plan  - Suspected unwitnessed fall with unknown loss of consciousness and the below noted injuries  - Fall precautions  - Geriatric Medicine evaluation and recommendations appreciated    - PT and OT evaluation and treatment  - Case Management consultation for disposition planning/expected post acute care facility need  Awaiting placement    Abrasions of multiple sites  Assessment & Plan  - Abrasions to multiple sites including all 4 extremities and his back  - Frequent repositioning, every 2 hours  - Inpatient Wound Care team following  Appreciate their recommendations  - Continue local wound care  [facility]  Assessment & Plan  - Traumatic rhabdomyolysis, present on admission, with associated NAVID   - Rhabdomyolysis resolving   - Trend CK on an as-needed basis going forward  - May saline lock IV   - Continue to encourage oral intake and adequate hydration  Encephalopathy acute  Assessment & Plan  - Acute encephalopathy on admission, likely multifactorial in setting of traumatic brain injury, multiple wounds with rhabdomyolysis and acute kidney injury, sepsis  Now significantly improved  - Blood cultures x2 with 1 of the tubes growing coagulase-negative Staphylococcus and Pantoea agglomerans  Patient was started on Ancef based on sensitivities  - Per ID, initial cultures likely a contaminant  Repeat cultures x2 negative at 72hrs, patient received 7 days abx, discontinued 9/4   - Inpatient wound care evaluation and recommendations appreciated  - Delirium precautions  Dementia [facility])  Assessment & Plan  - Chronic/baseline history of dementia  - Delirium precautions  - Geriatric Medicine evaluation and recommendations appreciated  - Continue current medication regimen   - Outpatient follow-up with PCP  * [facility]  Assessment & Plan  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 as well as suspected trace subarachnoid hemorrhage in the left parietal region on initial CT scan    On repeat CT scan of head on 8/28/2020 following transferred to [facility], there appeared to be slight increase of the bilateral intraventricular hemorrhage, blossoming of the left parietal subarachnoid hemorrhage, bilateral subdural hematomas and suspected bleeding near the brainstem  - Repeat CT head on 8/30/2020 demonstrated improvement and/or stable hemorrhages  - Appreciate Neurosurgery evaluation and recommendations  No operative intervention recommended  - Repeat CTH 9/4: Interval improvement in the previously seen intracranial hemorrhage  No significant mass effect or midline shift  No new intracranial hemorrhage is seen  - Continue Keppra to complete a 7 day course, ended 9/4  - Patient cleared for chemical DVT prophylaxis with subcutaneous heparin, all other anti-platelet and anticoagulant medications to be held until cleared by Neurosurgery  - Continue to monitor neurologic exam   - Continue PT and OT evaluation and treatment as indicated  - Outpatient follow-up with Neurosurgery  Disposition: Awaiting placement at post-acute rehab      SUBJECTIVE:  Chief Complaint: TBI, SAH, SDH, IVH, Traumatic Rhabdomyolysis    Subjective: No acute events overnight, no new complaints        OBJECTIVE:     Meds/Allergies     Current Facility-Administered Medications:     acetaminophen (TYLENOL) tablet 650 mg, 650 mg, Oral, Q6H PRN, Saundra Magallon PA-C    brimonidine (ALPHAGAN P) 0 15 % ophthalmic solution 1 drop, 1 drop, Both Eyes, Q8H [address], Soledad Mcbride PA-C, 1 drop at 09/06/20 0600    chlorhexidine (PERIDEX) 0 12 % oral rinse 15 mL, 15 mL, Swish & Spit, Q12H [address], Saundra Magallon PA-C, 15 mL at 09/06/20 2891    heparin (porcine) subcutaneous injection 5,000 Units, 5,000 Units, Subcutaneous, Q8H [address], Callie Coombs PA-C, 5,000 Units at 09/06/20 0601    lidocaine (LIDODERM) 5 % patch 1 patch, 1 patch, Topical, Daily, Saundra Magallon PA-C, 1 patch at 09/06/20 0924    ondansetron (ZOFRAN) injection 4 mg, 4 mg, Intravenous, Q6H PRN, Saundra Magallon PA-C    travoprost (TRAVATAN-Z) 0 004 % ophthalmic solution 1 drop, 1 drop, Both Eyes, Soledad MEHRAN Mcbride, 1 drop at 09/05/20 2112    ziprasidone (GEODON) capsule 60 mg, 60 mg, Oral, Daily, Renzo Espinosa PA-C, 60 mg at 09/06/20 0924    ziprasidone (GEODON) capsule 80 mg, 80 mg, Oral, QPM, Renzo Espinosa PA-C, 80 mg at 09/05/20 1728     Vitals:   Vitals:    09/05/20 2330   BP: 136/81   Pulse: 86   Resp: 16   Temp: 97 7 °F (36 5 °C)   SpO2: 94%       Intake/Output:  I/O       09/04 0701 - 09/05 0700 09/05 0701 - 09/06 0700 09/06 0701 - 09/07 0700    P  O  802 880 300    IV Piggyback       Total Intake(mL/kg) 802 (11 2) 880 (12 3) 300 (4 2)    Urine (mL/kg/hr) 475 (0 3) 750 (0 4)     Stool 0 0     Total Output 475 750     Net +327 +130 +300           Unmeasured Urine Occurrence 1 x 3 x     Unmeasured Stool Occurrence 1 x 2 x            Nutrition/GI Proph/Bowel Reg: Regular diet, No bowel regimen  Physical Exam:   General: AAO x 3, NAD  HEENT: Normocephalic, atraumatic, conjunctiva normal, EOMI, PERRLA  Neck: No JVD, No LAD  Cardio: RRR  Resp: NWB on RA  Abd: Soft, nontender, nondistended, No guarding, no rebound  Neuro: Sensation and motor intact x 4 limbs  Extremities: WWP, No edema  Skin: Warm and dry      Invasive Devices     Peripheral Intravenous Line            Peripheral IV 09/05/20 Distal;Left;Ventral (anterior) Forearm less than 1 day          Drain            External Urinary Catheter Small less than 1 day                 Lab Results: Results: I have personally reviewed pertinent reports  Imaging/EKG Studies: Results: I have personally reviewed pertinent reports      Other Studies: None  VTE Prophylaxis: Heparin

## 2020-09-06 NOTE — PLAN OF CARE
Problem: Potential for Falls  Goal: Patient will remain free of falls  Description: INTERVENTIONS:  - Assess patient frequently for physical needs  -  Identify cognitive and physical deficits and behaviors that affect risk of falls    -  [place] fall precautions as indicated by assessment   - Educate patient/family on patient safety including physical limitations  - Instruct patient to call for assistance with activity based on assessment  - Modify environment to reduce risk of injury  - Consider OT/PT consult to assist with strengthening/mobility  Outcome: Progressing     Problem: PAIN - ADULT  Goal: Verbalizes/displays adequate comfort level or baseline comfort level  Description: Interventions:  - Encourage patient to monitor pain and request assistance  - Assess pain using appropriate pain scale  - Administer analgesics based on type and severity of pain and evaluate response  - Implement non-pharmacological measures as appropriate and evaluate response  - Consider cultural and social influences on pain and pain management  - Notify physician/advanced practitioner if interventions unsuccessful or patient reports new pain  Outcome: Progressing     Problem: INFECTION - ADULT  Goal: Absence or prevention of progression during hospitalization  Description: INTERVENTIONS:  - Assess and monitor for signs and symptoms of infection  - Monitor lab/diagnostic results  - Monitor all insertion sites, i e  indwelling lines, tubes, and drains  - Monitor endotracheal if appropriate and nasal secretions for changes in amount and color  - [place] appropriate cooling/warming therapies per order  - Administer medications as ordered  - Instruct and encourage patient and family to use good hand hygiene technique  - Identify and instruct in appropriate isolation precautions for identified infection/condition  Outcome: Progressing     Problem: SAFETY ADULT  Goal: Patient will remain free of falls  Description: INTERVENTIONS:  - Assess patient frequently for physical needs  -  Identify cognitive and physical deficits and behaviors that affect risk of falls    -  [place] fall precautions as indicated by assessment   - Educate patient/family on patient safety including physical limitations  - Instruct patient to call for assistance with activity based on assessment  - Modify environment to reduce risk of injury  - Consider OT/PT consult to assist with strengthening/mobility  Outcome: Progressing  Goal: Maintain or return to baseline ADL function  Description: INTERVENTIONS:  -  Assess patient's ability to carry out ADLs; assess patient's baseline for ADL function and identify physical deficits which impact ability to perform ADLs (bathing, care of mouth/teeth, toileting, grooming, dressing, etc )  - Assess/evaluate cause of self-care deficits   - Assess range of motion  - Assess patient's mobility; develop plan if impaired  - Assess patient's need for assistive devices and provide as appropriate  - Encourage maximum independence but intervene and supervise when necessary  - Involve family in performance of ADLs  - Assess for home care needs following discharge   - Consider OT consult to assist with ADL evaluation and planning for discharge  - Provide patient education as appropriate  Outcome: Progressing  Goal: Maintain or return mobility status to optimal level  Description: INTERVENTIONS:  - Assess patient's baseline mobility status (ambulation, transfers, stairs, etc )    - Identify cognitive and physical deficits and behaviors that affect mobility  - Identify mobility aids required to assist with transfers and/or ambulation (gait belt, sit-to-stand, lift, walker, cane, etc )  - [place] fall precautions as indicated by assessment  - Record patient progress and toleration of activity level on Mobility SBAR; progress patient to next Phase/Stage  - Instruct patient to call for assistance with activity based on assessment  - Consider rehabilitation consult to assist with strengthening/weightbearing, etc   Outcome: Progressing     Problem: DISCHARGE PLANNING  Goal: Discharge to home or other facility with appropriate resources  Description: INTERVENTIONS:  - Identify barriers to discharge w/patient and caregiver  - Arrange for needed discharge resources and transportation as appropriate  - Identify discharge learning needs (meds, wound care, etc )  - Arrange for interpretive services to assist at discharge as needed  - Refer to Case Management Department for coordinating discharge planning if the patient needs post-hospital services based on physician/advanced practitioner order or complex needs related to functional status, cognitive ability, or social support system  Outcome: Progressing     Problem: Knowledge Deficit  Goal: Patient/family/caregiver demonstrates understanding of disease process, treatment plan, medications, and discharge instructions  Description: Complete learning assessment and assess knowledge base    Interventions:  - Provide teaching at level of understanding  - Provide teaching via preferred learning methods  Outcome: Progressing     Problem: NEUROSENSORY - ADULT  Goal: Achieves stable or improved neurological status  Description: INTERVENTIONS  - Monitor and report changes in neurological status  - Monitor vital signs such as temperature, blood pressure, glucose, and any other labs ordered   - Initiate measures to prevent increased intracranial pressure  - Monitor for seizure activity and implement precautions if appropriate      Outcome: Progressing  Goal: Remains free of injury related to seizures activity  Description: INTERVENTIONS  - Maintain airway, patient safety  and administer oxygen as ordered  - Monitor patient for seizure activity, document and report duration and description of seizure to physician/advanced practitioner  - If seizure occurs,  ensure patient safety during seizure  - Reorient patient post seizure  - Seizure pads on all 4 side rails  - Instruct patient/family to notify RN of any seizure activity including if an aura is experienced  - Instruct patient/family to call for assistance with activity based on nursing assessment  - Administer anti-seizure medications if ordered    Outcome: Progressing  Goal: Achieves maximal functionality and self care  Description: INTERVENTIONS  - Monitor swallowing and airway patency with patient fatigue and changes in neurological status  - Encourage and assist patient to increase activity and self care     - Encourage visually impaired, hearing impaired and aphasic patients to use assistive/communication devices  Outcome: Progressing     Problem: SKIN/TISSUE INTEGRITY - ADULT  Goal: Skin integrity remains intact  Description: INTERVENTIONS  - Identify patients at risk for skin breakdown  - Assess and monitor skin integrity  - Assess and monitor nutrition and hydration status  - Monitor labs (i e  albumin)  - Assess for incontinence   - Turn and reposition patient  - Assist with mobility/ambulation  - Relieve pressure over bony prominences  - Avoid friction and shearing  - Provide appropriate hygiene as needed including keeping skin clean and dry  - Evaluate need for skin moisturizer/barrier cream  - Collaborate with interdisciplinary team (i e  Nutrition, Rehabilitation, etc )   - Patient/family teaching  Outcome: Progressing  Goal: Incision(s), wounds(s) or drain site(s) healing without S/S of infection  Description: INTERVENTIONS  - Assess and document risk factors for skin impairment   - Assess and document dressing, incision, wound bed, drain sites and surrounding tissue  - Consider nutrition services referral as needed  - Oral mucous membranes remain intact  - Provide patient/ family education  Outcome: Progressing  Goal: Oral mucous membranes remain intact  Description: INTERVENTIONS  - Assess oral mucosa and hygiene practices  - Implement preventative oral hygiene regimen  - Implement oral medicated treatments as ordered  - Initiate Nutrition services referral as needed  Outcome: Progressing     Problem: HEMATOLOGIC - ADULT  Goal: Maintains hematologic stability  Description: INTERVENTIONS  - Assess for signs and symptoms of bleeding or hemorrhage  - Monitor labs  - Administer supportive blood products/factors as ordered and appropriate  Outcome: Progressing     Problem: Prexisting or High Potential for Compromised Skin Integrity  Goal: Skin integrity is maintained or improved  Description: INTERVENTIONS:  - Identify patients at risk for skin breakdown  - Assess and monitor skin integrity  - Assess and monitor nutrition and hydration status  - Monitor labs   - Assess for incontinence   - Turn and reposition patient  - Assist with mobility/ambulation  - Relieve pressure over bony prominences  - Avoid friction and shearing  - Provide appropriate hygiene as needed including keeping skin clean and dry  - Evaluate need for skin moisturizer/barrier cream  - Collaborate with interdisciplinary team   - Patient/family teaching  - Consider wound care consult   Outcome: Progressing     Problem: SAFETY,RESTRAINT: NV/NON-SELF DESTRUCTIVE BEHAVIOR  Goal: Remains free of harm/injury (restraint for non violent/non self-detsructive behavior)  Description: INTERVENTIONS:  - Instruct patient/family regarding restraint use   - Assess and monitor physiologic and psychological status   - Provide interventions and comfort measures to meet assessed patient needs   - Identify and implement measures to help patient regain control  - Assess readiness for release of restraint   Outcome: Progressing  Goal: Returns to optimal restraint-free functioning  Description: INTERVENTIONS:  - Assess the patient's behavior and symptoms that indicate continued need for restraint  - Identify and implement measures to help patient regain control  - Assess readiness for release of restraint   Outcome: Progressing Problem: COPING  Goal: Pt/Family able to verbalize concerns and demonstrate effective coping strategies  Description: INTERVENTIONS:  - Assist patient/family to identify coping skills, available support systems and cultural and spiritual values  - Provide emotional support, including active listening and acknowledgement of concerns of patient and caregivers  - Reduce environmental stimuli, as able  - Provide patient education  - Assess for spiritual pain/suffering and initiate spiritual care, including notification of Pastoral Care or afshan based community as needed  - Assess effectiveness of coping strategies  Outcome: Progressing     Problem: DECISION MAKING  Goal: Pt/Family able to effectively weigh alternatives and participate in decision making related to treatment and care  Description: INTERVENTIONS:  - Identify decision maker  - Determine when there are differences among patient's view, family's view, and healthcare provider's view of patient condition and care goals  - Facilitate patient/family articulation of goals for care  - Help patient/family identify pros/cons of alternative solutions  - Provide information as requested by patient/family  - Respect patient/family rights related to privacy and sharing information   - Serve as a liaison between patient, family and health care team  - Initiate consults as appropriate (Ethics Team, Palliative Care, Family Care Conference, etc )  Outcome: Progressing     Problem: BEHAVIOR  Goal: Pt/Family maintain appropriate behavior and adhere to behavioral management agreement, if implemented  Description: INTERVENTIONS:  - Assess the family dynamic   - Encourage verbalization of thoughts and concerns in a socially appropriate manner  - Assess patient/family's coping skills and non-compliant behavior (including use of illegal substances)    - Utilize positive, consistent limit setting strategies supporting safety of patient, staff and others  - Initiate consult with Case Management, Spiritual Care or other ancillary services as appropriate  - If a patient's/visitor's behavior jeopardizes the safety of the patient, staff, or others, refer to organization procedure  - Notify Security of behavior or suspected illegal substances which indicate the need for search of the patient and/or belongings  - Encourage participation in the decision making process about a behavioral management agreement; implement if patient meets criteria  Outcome: Progressing     Problem: Nutrition/Hydration-ADULT  Goal: Nutrient/Hydration intake appropriate for improving, restoring or maintaining nutritional needs  Description: Monitor and assess patient's nutrition/hydration status for malnutrition  Collaborate with interdisciplinary team and initiate plan and interventions as ordered  Monitor patient's weight and dietary intake as ordered or per policy  Utilize nutrition screening tool and intervene as necessary  Determine patient's food preferences and provide high-protein, high-caloric foods as appropriate       INTERVENTIONS:  - Monitor oral intake, urinary output, labs, and treatment plans  - Assess nutrition and hydration status and recommend course of action  - Evaluate amount of meals eaten  - Assist patient with eating if necessary   - Allow adequate time for meals  - Recommend/ encourage appropriate diets, oral nutritional supplements, and vitamin/mineral supplements  - Order, calculate, and assess calorie counts as needed  - Recommend, monitor, and adjust tube feedings and TPN/PPN based on assessed needs  - Assess need for intravenous fluids  - Provide specific nutrition/hydration education as appropriate  - Include patient/family/caregiver in decisions related to nutrition  Outcome: Progressing

## 2020-09-06 NOTE — OCCUPATIONAL THERAPY NOTE
Occupational Therapy Treatment Note:         09/06/20 1159   Restrictions/Precautions   Weight Bearing Precautions Per Order Yes   RUE Weight Bearing Per Order WBAT   Other Precautions Chair Alarm; Bed Alarm;WBS;Hard of hearing; Impulsive   Pain Assessment   Pain Assessment Tool Pain Assessment not indicated - pt denies pain   Pain Score No Pain   ADL   Where Assessed Edge of bed   Eating Assistance 5  Supervision/Setup   Eating Deficit Impulsive; Increased time to complete   UB Bathing Assistance 5  Supervision/Setup   UB Bathing Deficit Steadying; Impulsive;Supervision/safety; Increased time to complete;Verbal cueing   LB Bathing Assistance 3  Moderate Assistance   LB Bathing Deficit Steadying; Impulsive;Supervision/safety; Increased time to complete;Perineal area; Buttocks;Right lower leg including foot; Left lower leg including foot;Verbal cueing   UB Dressing Assistance 4  Minimal Assistance   UB Dressing Deficit Steadying; Impulsive;Supervision/safety; Increased time to complete;Pull around back;Verbal cueing   LB Dressing Assistance 3  Moderate Assistance   LB Dressing Deficit Steadying; Impulsive;Supervision/safety; Increased time to complete; Thread RLE into pants; Thread LLE into pants;Verbal cueing   Functional Standing Tolerance   Time Fair - balance noted during functional standing tasks  Bed Mobility   Supine to Sit 4  Minimal assistance   Additional items Assist x 1; Increased time required;Verbal cues   Transfers   Sit to Stand 4  Minimal assistance   Additional items Assist x 1; Increased time required; Impulsive;Verbal cues   Stand to Sit 4  Minimal assistance   Additional items Assist x 1; Increased time required; Impulsive;Verbal cues   Functional Mobility   Functional Mobility 4  Minimal assistance   Additional Comments Pt noted to be a bit unsteady, requiring SBA / min A for safety and steadying  Additional items Rolling walker   Cognition   Arousal/Participation Alert; Cooperative   Attention Attends with cues to redirect   Orientation Level Oriented X4   Memory Decreased short term memory;Decreased recall of recent events;Decreased recall of precautions   Following Commands Follows one step commands with increased time or repetition   Comments Pt noted to be mildly impulsive, requiring maximal reassurance  Pt also noted to be very [facility] but was able to read written sentences and respond quickly  Activity Tolerance   Activity Tolerance Patient tolerated treatment well   Assessment   Assessment Pt participated in am therapy session focusing on ADL retraining, functional transfer retraining and functional mobility education and retraining  Upon arrival, pt was supine in bed  Pt was agreeable to session  Pt is [facility], requiring questions or concerns be written out for him to read to understand and communicate  Pt completes UB bathing c S, UB dressing c min A, LB bathing/dressing c mod A and self-feeding c S  Pt can perform transfers from recliner chair and arm chair c min A x 1 for safety and steadying  Pt able to mobilize c RW c SBA / min A for safety/steadying  Pt noted to be impulsive during this session, demonstrating poor insight into safety precautions and awareness  Pt repeatedly stated "I am so nervous" before or during any task completed this session  Pt is still limited by factors stated above  Pt will continue to benefit from skilled OT services to maximize independence with ADLs/IADLs, functional transfers and functional mobility     Recommendation   OT Discharge Recommendation Post-Acute Rehabilitation Services   OT - OK to Discharge Yes     Ida Sorto

## 2020-09-07 PROCEDURE — 99232 SBSQ HOSP IP/OBS MODERATE 35: CPT | Performed by: SURGERY

## 2020-09-07 RX ADMIN — ZIPRASIDONE HYDROCHLORIDE 60 MG: 20 CAPSULE ORAL at 08:35

## 2020-09-07 RX ADMIN — BRIMONIDINE TARTRATE 1 DROP: 1.5 SOLUTION OPHTHALMIC at 21:52

## 2020-09-07 RX ADMIN — CHLORHEXIDINE GLUCONATE 0.12% ORAL RINSE 15 ML: 1.2 LIQUID ORAL at 21:52

## 2020-09-07 RX ADMIN — HEPARIN SODIUM 5000 UNITS: 5000 INJECTION INTRAVENOUS; SUBCUTANEOUS at 21:52

## 2020-09-07 RX ADMIN — BRIMONIDINE TARTRATE 1 DROP: 1.5 SOLUTION OPHTHALMIC at 16:19

## 2020-09-07 RX ADMIN — HEPARIN SODIUM 5000 UNITS: 5000 INJECTION INTRAVENOUS; SUBCUTANEOUS at 05:21

## 2020-09-07 RX ADMIN — ZIPRASIDONE HYDROCHLORIDE 80 MG: 40 CAPSULE ORAL at 17:32

## 2020-09-07 RX ADMIN — CHLORHEXIDINE GLUCONATE 0.12% ORAL RINSE 15 ML: 1.2 LIQUID ORAL at 08:35

## 2020-09-07 RX ADMIN — HEPARIN SODIUM 5000 UNITS: 5000 INJECTION INTRAVENOUS; SUBCUTANEOUS at 16:19

## 2020-09-07 RX ADMIN — TRAVOPROST 1 DROP: 0.04 SOLUTION/ DROPS OPHTHALMIC at 21:52

## 2020-09-07 RX ADMIN — LIDOCAINE 5% 1 PATCH: 700 PATCH TOPICAL at 08:35

## 2020-09-07 RX ADMIN — BRIMONIDINE TARTRATE 1 DROP: 1.5 SOLUTION OPHTHALMIC at 05:20

## 2020-09-07 NOTE — ASSESSMENT & PLAN NOTE
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 as well as suspected trace subarachnoid hemorrhage in the left parietal region on initial CT scan  On repeat CT scan of head on 8/28/2020 following transferred to [facility], there appeared to be slight increase of the bilateral intraventricular hemorrhage, blossoming of the left parietal subarachnoid hemorrhage, bilateral subdural hematomas and suspected bleeding near the brainstem  - Repeat CT head on 8/30/2020 demonstrated improvement and/or stable hemorrhages  - Appreciate Neurosurgery evaluation and recommendations  No operative intervention recommended  - Repeat CTH 9/4: Interval improvement in the previously seen intracranial hemorrhage  No significant mass effect or midline shift  No new intracranial hemorrhage is seen  - Continue Keppra to complete a 7 day course, ended 9/4  - Patient cleared for chemical DVT prophylaxis with subcutaneous heparin, all other anti-platelet and anticoagulant medications to be held until cleared by Neurosurgery  - Continue to monitor neurologic exam   - Continue PT and OT evaluation and treatment as indicated  - Outpatient follow-up with Neurosurgery

## 2020-09-07 NOTE — PLAN OF CARE
Problem: Potential for Falls  Goal: Patient will remain free of falls  Description: INTERVENTIONS:  - Assess patient frequently for physical needs  -  Identify cognitive and physical deficits and behaviors that affect risk of falls    -  [place] fall precautions as indicated by assessment   - Educate patient/family on patient safety including physical limitations  - Instruct patient to call for assistance with activity based on assessment  - Modify environment to reduce risk of injury  - Consider OT/PT consult to assist with strengthening/mobility  Outcome: Progressing     Problem: PAIN - ADULT  Goal: Verbalizes/displays adequate comfort level or baseline comfort level  Description: Interventions:  - Encourage patient to monitor pain and request assistance  - Assess pain using appropriate pain scale  - Administer analgesics based on type and severity of pain and evaluate response  - Implement non-pharmacological measures as appropriate and evaluate response  - Consider cultural and social influences on pain and pain management  - Notify physician/advanced practitioner if interventions unsuccessful or patient reports new pain  Outcome: Progressing     Problem: INFECTION - ADULT  Goal: Absence or prevention of progression during hospitalization  Description: INTERVENTIONS:  - Assess and monitor for signs and symptoms of infection  - Monitor lab/diagnostic results  - Monitor all insertion sites, i e  indwelling lines, tubes, and drains  - Monitor endotracheal if appropriate and nasal secretions for changes in amount and color  - [place] appropriate cooling/warming therapies per order  - Administer medications as ordered  - Instruct and encourage patient and family to use good hand hygiene technique  - Identify and instruct in appropriate isolation precautions for identified infection/condition  Outcome: Progressing     Problem: SAFETY ADULT  Goal: Patient will remain free of falls  Description: INTERVENTIONS:  - Assess patient frequently for physical needs  -  Identify cognitive and physical deficits and behaviors that affect risk of falls    -  [place] fall precautions as indicated by assessment   - Educate patient/family on patient safety including physical limitations  - Instruct patient to call for assistance with activity based on assessment  - Modify environment to reduce risk of injury  - Consider OT/PT consult to assist with strengthening/mobility  Outcome: Progressing  Goal: Maintain or return to baseline ADL function  Description: INTERVENTIONS:  -  Assess patient's ability to carry out ADLs; assess patient's baseline for ADL function and identify physical deficits which impact ability to perform ADLs (bathing, care of mouth/teeth, toileting, grooming, dressing, etc )  - Assess/evaluate cause of self-care deficits   - Assess range of motion  - Assess patient's mobility; develop plan if impaired  - Assess patient's need for assistive devices and provide as appropriate  - Encourage maximum independence but intervene and supervise when necessary  - Involve family in performance of ADLs  - Assess for home care needs following discharge   - Consider OT consult to assist with ADL evaluation and planning for discharge  - Provide patient education as appropriate  Outcome: Progressing  Goal: Maintain or return mobility status to optimal level  Description: INTERVENTIONS:  - Assess patient's baseline mobility status (ambulation, transfers, stairs, etc )    - Identify cognitive and physical deficits and behaviors that affect mobility  - Identify mobility aids required to assist with transfers and/or ambulation (gait belt, sit-to-stand, lift, walker, cane, etc )  - [place] fall precautions as indicated by assessment  - Record patient progress and toleration of activity level on Mobility SBAR; progress patient to next Phase/Stage  - Instruct patient to call for assistance with activity based on assessment  - Consider rehabilitation consult to assist with strengthening/weightbearing, etc   Outcome: Progressing     Problem: DISCHARGE PLANNING  Goal: Discharge to home or other facility with appropriate resources  Description: INTERVENTIONS:  - Identify barriers to discharge w/patient and caregiver  - Arrange for needed discharge resources and transportation as appropriate  - Identify discharge learning needs (meds, wound care, etc )  - Arrange for interpretive services to assist at discharge as needed  - Refer to Case Management Department for coordinating discharge planning if the patient needs post-hospital services based on physician/advanced practitioner order or complex needs related to functional status, cognitive ability, or social support system  Outcome: Progressing     Problem: Knowledge Deficit  Goal: Patient/family/caregiver demonstrates understanding of disease process, treatment plan, medications, and discharge instructions  Description: Complete learning assessment and assess knowledge base    Interventions:  - Provide teaching at level of understanding  - Provide teaching via preferred learning methods  Outcome: Progressing     Problem: NEUROSENSORY - ADULT  Goal: Achieves stable or improved neurological status  Description: INTERVENTIONS  - Monitor and report changes in neurological status  - Monitor vital signs such as temperature, blood pressure, glucose, and any other labs ordered   - Initiate measures to prevent increased intracranial pressure  - Monitor for seizure activity and implement precautions if appropriate      Outcome: Progressing  Goal: Remains free of injury related to seizures activity  Description: INTERVENTIONS  - Maintain airway, patient safety  and administer oxygen as ordered  - Monitor patient for seizure activity, document and report duration and description of seizure to physician/advanced practitioner  - If seizure occurs,  ensure patient safety during seizure  - Reorient patient post seizure  - Seizure pads on all 4 side rails  - Instruct patient/family to notify RN of any seizure activity including if an aura is experienced  - Instruct patient/family to call for assistance with activity based on nursing assessment  - Administer anti-seizure medications if ordered    Outcome: Progressing  Goal: Achieves maximal functionality and self care  Description: INTERVENTIONS  - Monitor swallowing and airway patency with patient fatigue and changes in neurological status  - Encourage and assist patient to increase activity and self care     - Encourage visually impaired, hearing impaired and aphasic patients to use assistive/communication devices  Outcome: Progressing     Problem: SKIN/TISSUE INTEGRITY - ADULT  Goal: Skin integrity remains intact  Description: INTERVENTIONS  - Identify patients at risk for skin breakdown  - Assess and monitor skin integrity  - Assess and monitor nutrition and hydration status  - Monitor labs (i e  albumin)  - Assess for incontinence   - Turn and reposition patient  - Assist with mobility/ambulation  - Relieve pressure over bony prominences  - Avoid friction and shearing  - Provide appropriate hygiene as needed including keeping skin clean and dry  - Evaluate need for skin moisturizer/barrier cream  - Collaborate with interdisciplinary team (i e  Nutrition, Rehabilitation, etc )   - Patient/family teaching  Outcome: Progressing  Goal: Incision(s), wounds(s) or drain site(s) healing without S/S of infection  Description: INTERVENTIONS  - Assess and document risk factors for skin impairment   - Assess and document dressing, incision, wound bed, drain sites and surrounding tissue  - Consider nutrition services referral as needed  - Oral mucous membranes remain intact  - Provide patient/ family education  Outcome: Progressing  Goal: Oral mucous membranes remain intact  Description: INTERVENTIONS  - Assess oral mucosa and hygiene practices  - Implement preventative oral hygiene regimen  - Implement oral medicated treatments as ordered  - Initiate Nutrition services referral as needed  Outcome: Progressing     Problem: HEMATOLOGIC - ADULT  Goal: Maintains hematologic stability  Description: INTERVENTIONS  - Assess for signs and symptoms of bleeding or hemorrhage  - Monitor labs  - Administer supportive blood products/factors as ordered and appropriate  Outcome: Progressing     Problem: Prexisting or High Potential for Compromised Skin Integrity  Goal: Skin integrity is maintained or improved  Description: INTERVENTIONS:  - Identify patients at risk for skin breakdown  - Assess and monitor skin integrity  - Assess and monitor nutrition and hydration status  - Monitor labs   - Assess for incontinence   - Turn and reposition patient  - Assist with mobility/ambulation  - Relieve pressure over bony prominences  - Avoid friction and shearing  - Provide appropriate hygiene as needed including keeping skin clean and dry  - Evaluate need for skin moisturizer/barrier cream  - Collaborate with interdisciplinary team   - Patient/family teaching  - Consider wound care consult   Outcome: Progressing     Problem: SAFETY,RESTRAINT: NV/NON-SELF DESTRUCTIVE BEHAVIOR  Goal: Remains free of harm/injury (restraint for non violent/non self-detsructive behavior)  Description: INTERVENTIONS:  - Instruct patient/family regarding restraint use   - Assess and monitor physiologic and psychological status   - Provide interventions and comfort measures to meet assessed patient needs   - Identify and implement measures to help patient regain control  - Assess readiness for release of restraint   Outcome: Progressing  Goal: Returns to optimal restraint-free functioning  Description: INTERVENTIONS:  - Assess the patient's behavior and symptoms that indicate continued need for restraint  - Identify and implement measures to help patient regain control  - Assess readiness for release of restraint   Outcome: Progressing Problem: COPING  Goal: Pt/Family able to verbalize concerns and demonstrate effective coping strategies  Description: INTERVENTIONS:  - Assist patient/family to identify coping skills, available support systems and cultural and spiritual values  - Provide emotional support, including active listening and acknowledgement of concerns of patient and caregivers  - Reduce environmental stimuli, as able  - Provide patient education  - Assess for spiritual pain/suffering and initiate spiritual care, including notification of Pastoral Care or afshan based community as needed  - Assess effectiveness of coping strategies  Outcome: Progressing     Problem: DECISION MAKING  Goal: Pt/Family able to effectively weigh alternatives and participate in decision making related to treatment and care  Description: INTERVENTIONS:  - Identify decision maker  - Determine when there are differences among patient's view, family's view, and healthcare provider's view of patient condition and care goals  - Facilitate patient/family articulation of goals for care  - Help patient/family identify pros/cons of alternative solutions  - Provide information as requested by patient/family  - Respect patient/family rights related to privacy and sharing information   - Serve as a liaison between patient, family and health care team  - Initiate consults as appropriate (Ethics Team, Palliative Care, Family Care Conference, etc )  Outcome: Progressing     Problem: BEHAVIOR  Goal: Pt/Family maintain appropriate behavior and adhere to behavioral management agreement, if implemented  Description: INTERVENTIONS:  - Assess the family dynamic   - Encourage verbalization of thoughts and concerns in a socially appropriate manner  - Assess patient/family's coping skills and non-compliant behavior (including use of illegal substances)    - Utilize positive, consistent limit setting strategies supporting safety of patient, staff and others  - Initiate consult with Case Management, Spiritual Care or other ancillary services as appropriate  - If a patient's/visitor's behavior jeopardizes the safety of the patient, staff, or others, refer to organization procedure  - Notify Security of behavior or suspected illegal substances which indicate the need for search of the patient and/or belongings  - Encourage participation in the decision making process about a behavioral management agreement; implement if patient meets criteria  Outcome: Progressing     Problem: Nutrition/Hydration-ADULT  Goal: Nutrient/Hydration intake appropriate for improving, restoring or maintaining nutritional needs  Description: Monitor and assess patient's nutrition/hydration status for malnutrition  Collaborate with interdisciplinary team and initiate plan and interventions as ordered  Monitor patient's weight and dietary intake as ordered or per policy  Utilize nutrition screening tool and intervene as necessary  Determine patient's food preferences and provide high-protein, high-caloric foods as appropriate       INTERVENTIONS:  - Monitor oral intake, urinary output, labs, and treatment plans  - Assess nutrition and hydration status and recommend course of action  - Evaluate amount of meals eaten  - Assist patient with eating if necessary   - Allow adequate time for meals  - Recommend/ encourage appropriate diets, oral nutritional supplements, and vitamin/mineral supplements  - Order, calculate, and assess calorie counts as needed  - Recommend, monitor, and adjust tube feedings and TPN/PPN based on assessed needs  - Assess need for intravenous fluids  - Provide specific nutrition/hydration education as appropriate  - Include patient/family/caregiver in decisions related to nutrition  Outcome: Progressing

## 2020-09-07 NOTE — PROGRESS NOTES
Progress Note - Miriam Butts 1945, 66 y o  male MRN: [id number]    Unit/Bed#: [facility] 603-01 Encounter: [id number]    Primary Care Provider: Nereida Mixon MD   Date and time admitted to hospital: 8/28/2020 12:43 PM        Blood culture positive for microorganism  Assessment & Plan  - Blood cultures x2 with 1 of the tube growing coagulase-negative Staphylococcus and Pantoea agglomerans  Patient was started on Ancef based on sensitivities  - Repeat blood cultures from 09/01/2020 remain negative to date  - Patient without fevers over the last 24 hours  - antibiotics stopped by ID on 9/3      IVH (intraventricular hemorrhage) (HCC)  Assessment & Plan  - Please see outlined management under TBI diagnosis  Subdural hematoma (HCC)  Assessment & Plan  - Please see outlined management under TBI diagnosis  Subarachnoid hemorrhage ([facility] )  Assessment & Plan  - Please see outlined management under TBI diagnosis  Type 2 diabetes mellitus, without long-term current use of insulin [facility])  Assessment & Plan    Lab Results   Component Value Date    HGBA1C 5 7 (H) 08/29/2020     - Documented history of type 2 diabetes mellitus with questionable use of metformin per only available medication list from February 2018  - Currently without hyperglycemia on initial lab workup and most recent hemoglobin A1c from March 2020 was 5 7  - Not on ISS, continue to monitor via routine labwork    Pressure injury of back, stage 1  Assessment & Plan  - Suspected stage I pressure injury of the back  - Inpatient Wound Care service evaluation and recommendations appreciated  - Continue with local wound care to multiple wound sites  Fall  Assessment & Plan  - Suspected unwitnessed fall with unknown loss of consciousness and the below noted injuries  - Fall precautions  - Geriatric Medicine evaluation and recommendations appreciated    - PT and OT evaluation and treatment  - Case Management consultation for disposition planning/expected post acute care facility need  Awaiting placement    Abrasions of multiple sites  Assessment & Plan  - Abrasions to multiple sites including all 4 extremities and his back  - Frequent repositioning, every 2 hours  - Inpatient Wound Care team following  Appreciate their recommendations  - Continue local wound care  Traumatic rhabdomyolysis ([facility] )  Assessment & Plan  - Traumatic rhabdomyolysis, present on admission, with associated NAVID   - Rhabdomyolysis resolving   - Trend CK on an as-needed basis going forward  - May saline lock IV   - Continue to encourage oral intake and adequate hydration  Encephalopathy acute  Assessment & Plan  - Acute encephalopathy on admission, likely multifactorial in setting of traumatic brain injury, multiple wounds with rhabdomyolysis and acute kidney injury, sepsis  Now significantly improved  - Blood cultures x2 with 1 of the tubes growing coagulase-negative Staphylococcus and Pantoea agglomerans  Patient was started on Ancef based on sensitivities  - Per ID, initial cultures likely a contaminant  Repeat cultures x2 negative at 72hrs, patient received 7 days abx, discontinued 9/4   - Inpatient wound care evaluation and recommendations appreciated  - Delirium precautions   -Now at baseline    Dementia [facility])  Assessment & Plan  - Chronic/baseline history of dementia  - Delirium precautions  - Geriatric Medicine evaluation and recommendations appreciated  - Continue current medication regimen   - Outpatient follow-up with PCP  * TBI (traumatic brain injury) ([facility] )  Assessment & Plan  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 as well as suspected trace subarachnoid hemorrhage in the left parietal region on initial CT scan    On repeat CT scan of head on 8/28/2020 following transferred to [address] [place] [address], there appeared to be slight increase of the bilateral intraventricular hemorrhage, blossoming of the left parietal subarachnoid hemorrhage, bilateral subdural hematomas and suspected bleeding near the brainstem  - Repeat CT head on 8/30/2020 demonstrated improvement and/or stable hemorrhages  - Appreciate Neurosurgery evaluation and recommendations  No operative intervention recommended  - Repeat CTH 9/4: Interval improvement in the previously seen intracranial hemorrhage  No significant mass effect or midline shift  No new intracranial hemorrhage is seen  - Continue Keppra to complete a 7 day course, ended 9/4  - Patient cleared for chemical DVT prophylaxis with subcutaneous heparin, all other anti-platelet and anticoagulant medications to be held until cleared by Neurosurgery  - Continue to monitor neurologic exam   - Continue PT and OT evaluation and treatment as indicated  - Outpatient follow-up with Neurosurgery  Disposition: Awaiting rehab placement      SUBJECTIVE:  Chief Complaint: Traumatic Brain Injury, SAH, SDH, IPH    Subjective: Some agitation overnight but redirectable, no acute events overnight, No new complaints        OBJECTIVE:     Meds/Allergies     Current Facility-Administered Medications:     acetaminophen (TYLENOL) tablet 650 mg, 650 mg, Oral, Q6H PRN, Saundra Magallon PA-C    brimonidine (ALPHAGAN P) 0 15 % ophthalmic solution 1 drop, 1 drop, Both Eyes, Q8H [facility], Soledad Mcbride PA-C, 1 drop at 09/07/20 0520    chlorhexidine (PERIDEX) 0 12 % oral rinse 15 mL, 15 mL, Swish & Spit, Q12H [facility], Cory Koroma PA-C, 15 mL at 09/07/20 0835    heparin (porcine) subcutaneous injection 5,000 Units, 5,000 Units, Subcutaneous, Q8H [facility], Callie Coombs PA-C, 5,000 Units at 09/07/20 0521    lidocaine (LIDODERM) 5 % patch 1 patch, 1 patch, Topical, Daily, Saundra Magallon PA-C, 1 patch at 09/07/20 0835    ondansetron (ZOFRAN) injection 4 mg, 4 mg, Intravenous, Q6H PRN, Saundra Magallon PA-C    travoprost (TRAVATAN-Z) 0 004 % ophthalmic solution 1 drop, 1 drop, Both Eyes, HS, SoledadHARPER Desouza-C, 1 drop at 09/06/20 2245    ziprasidone (GEODON) capsule 60 mg, 60 mg, Oral, Daily, FelishaHARPER Weldon-C, 60 mg at 09/07/20 0835    ziprasidone (GEODON) capsule 80 mg, 80 mg, Oral, QPM, HARPER Curry-C, 80 mg at 09/06/20 1800     Vitals:   Vitals:    09/07/20 0715   BP: 106/80   Pulse: 84   Resp:    Temp:    SpO2: 97%       Intake/Output:  I/O       09/05 0701 - 09/06 0700 09/06 0701 - 09/07 0700 09/07 0701 - 09/08 0700    P  O  880 1530     Total Intake(mL/kg) 880 (12 3) 1530 (21 9)     Urine (mL/kg/hr) 750 (0 4) 150 (0 1)     Stool 0      Total Output 750 150     Net +130 +1380            Unmeasured Urine Occurrence 3 x 4 x     Unmeasured Stool Occurrence 2 x             Nutrition/GI Proph/Bowel Reg: Regular Diet, No bowel regimen    Physical Exam:   General: AAO x 3, hearing difficulty, NAD  HEENT: Normocephalic, atraumatic, conjunctiva normal, EOMI, PERRLA  Neck: No JVD, No LAD  Cardio: RRR  Resp: NWB on RA  Abd: Soft, nontender, nondistended, No guarding, no rebound  Neuro: Sensation and motor intact x 4 limbs  Extremities: WWP, No edema  Skin: Warm and dry, some superficial wounds to back      Invasive Devices     Peripheral Intravenous Line            Peripheral IV 09/05/20 Distal;Left;Ventral (anterior) Forearm 1 day          Drain            External Urinary Catheter Small 1 day                 Lab Results: Results: I have personally reviewed pertinent reports  Imaging/EKG Studies: Results: I have personally reviewed pertinent reports      Other Studies: None  VTE Prophylaxis: Heparin

## 2020-09-07 NOTE — ASSESSMENT & PLAN NOTE
- Acute encephalopathy on admission, likely multifactorial in setting of traumatic brain injury, multiple wounds with rhabdomyolysis and acute kidney injury, sepsis  Now significantly improved  - Blood cultures x2 with 1 of the tubes growing coagulase-negative Staphylococcus and Pantoea agglomerans  Patient was started on Ancef based on sensitivities  - Per ID, initial cultures likely a contaminant  Repeat cultures x2 negative at 72hrs, patient received 7 days abx, discontinued 9/4   - Inpatient wound care evaluation and recommendations appreciated    - Delirium precautions   -Now at baseline

## 2020-09-08 PROBLEM — T79.6XXA TRAUMATIC RHABDOMYOLYSIS (HCC): Status: RESOLVED | Noted: 2020-08-28 | Resolved: 2020-09-08

## 2020-09-08 PROCEDURE — 99232 SBSQ HOSP IP/OBS MODERATE 35: CPT | Performed by: SURGERY

## 2020-09-08 PROCEDURE — 99233 SBSQ HOSP IP/OBS HIGH 50: CPT | Performed by: INTERNAL MEDICINE

## 2020-09-08 PROCEDURE — 99232 SBSQ HOSP IP/OBS MODERATE 35: CPT | Performed by: NURSE PRACTITIONER

## 2020-09-08 RX ADMIN — ZIPRASIDONE HYDROCHLORIDE 80 MG: 40 CAPSULE ORAL at 19:09

## 2020-09-08 RX ADMIN — CHLORHEXIDINE GLUCONATE 0.12% ORAL RINSE 15 ML: 1.2 LIQUID ORAL at 22:33

## 2020-09-08 RX ADMIN — LIDOCAINE 5% 1 PATCH: 700 PATCH TOPICAL at 10:44

## 2020-09-08 RX ADMIN — HEPARIN SODIUM 5000 UNITS: 5000 INJECTION INTRAVENOUS; SUBCUTANEOUS at 13:57

## 2020-09-08 RX ADMIN — BRIMONIDINE TARTRATE 1 DROP: 1.5 SOLUTION OPHTHALMIC at 22:31

## 2020-09-08 RX ADMIN — CHLORHEXIDINE GLUCONATE 0.12% ORAL RINSE 15 ML: 1.2 LIQUID ORAL at 10:43

## 2020-09-08 RX ADMIN — ZIPRASIDONE HYDROCHLORIDE 60 MG: 20 CAPSULE ORAL at 10:45

## 2020-09-08 RX ADMIN — TRAVOPROST 1 DROP: 0.04 SOLUTION/ DROPS OPHTHALMIC at 22:30

## 2020-09-08 RX ADMIN — BRIMONIDINE TARTRATE 1 DROP: 1.5 SOLUTION OPHTHALMIC at 14:00

## 2020-09-08 RX ADMIN — HEPARIN SODIUM 5000 UNITS: 5000 INJECTION INTRAVENOUS; SUBCUTANEOUS at 06:09

## 2020-09-08 RX ADMIN — HEPARIN SODIUM 5000 UNITS: 5000 INJECTION INTRAVENOUS; SUBCUTANEOUS at 22:33

## 2020-09-08 NOTE — ASSESSMENT & PLAN NOTE
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 as well as suspected trace subarachnoid hemorrhage in the left parietal region on initial CT scan  On repeat CT scan of head on 8/28/2020 following transferred to [facility], there appeared to be slight increase of the bilateral intraventricular hemorrhage, blossoming of the left parietal subarachnoid hemorrhage, bilateral subdural hematomas and suspected bleeding near the brainstem  - Repeat CT head on 8/30/2020 demonstrated improvement and/or stable hemorrhages  - Appreciate Neurosurgery evaluation and recommendations  No operative intervention recommended  - Repeat CTH 9/4: Interval improvement in the previously seen intracranial hemorrhage  No significant mass effect or midline shift  No new intracranial hemorrhage is seen  - Continue Keppra to complete a 7 day course, ended 9/4/2020   - Patient cleared for chemical DVT prophylaxis with subcutaneous heparin, all other anti-platelet and anticoagulant medications to be held until cleared by Neurosurgery  - Continue to monitor neurologic exam   - Continue PT and OT evaluation and treatment as indicated  - Outpatient follow-up with Neurosurgery

## 2020-09-08 NOTE — ASSESSMENT & PLAN NOTE
Patient seen for 2 week follow-up  Bilateral subdural hematoma  · Presumed unwitnessed fall  · Found down in front yard discharge hold with diffuse abrasions  · Transferred from [address]:  · CT head 9/4/20: Interval improvement in the previously seen intracranial hemorrhage, as described  No significant mass effect or midline shift  No new intracranial hemorrhage is seen  No other significant interval change  Plan:  · Continue monitor neurological exam   Currently GCS13, E4, V4, M5  Opens eyes spontaneously  Does not follow commands but can mimic  Moving all extremities without focal weakness  · Continue neurological exams  · STAT CT head with any neurological decline  · Hold all anti-platelet and anticoagulation medications  Patient's home medications unknown at this time  INR normal at 1 2  · Blood pressure control with recommended systolic blood pressure less than 160  · Completed Keppra for 7 days   · No neurosurgical intervention warranted at this time  · Mobilize patient as tolerated  · PT/OT recommending acute rehab when medically stable  · DVT prophylaxis: SCDs and SQ heparin     Neurosurgery will sign off, patient will follow-up in 4 weeks with CT head, call with any questions or concerns

## 2020-09-08 NOTE — ASSESSMENT & PLAN NOTE
Imaging:  · CTA head and neck 8/28/20: Grossly stable multifocal extra-axial hemorrhage as described  Stable hypoattenuating subdural collections over bilateral cerebral hemispheres  No significant mass effect  Stable small layering blood within posterior horn of bilateral lateral ventricles  Patent major vasculature of [place] without significant stenosis  No aneurysm  No hemodynamically significant stenosis of major cervical vasculature        Plan:  · Continue fall precautions  · PT/OT recommending acute rehab when medically stable  · Geriatrics following, input appreciated

## 2020-09-08 NOTE — ASSESSMENT & PLAN NOTE
- Acute encephalopathy on admission, likely multifactorial in setting of traumatic brain injury, multiple wounds with rhabdomyolysis and acute kidney injury, sepsis  Now significantly improved  - Blood cultures x2 with 1 of the tubes growing coagulase-negative Staphylococcus and Pantoea agglomerans  Patient was started on Ancef based on sensitivities  - Per ID, initial cultures likely a contaminant  Repeat cultures x2 negative at 72hrs, patient received 7 days abx, discontinued 9/4/2020   - Inpatient wound care evaluation and recommendations appreciated  - Delirium precautions  - Now appears to be at baseline mental status

## 2020-09-08 NOTE — ASSESSMENT & PLAN NOTE
Patient seen for 2 week follow-up  Bilateral subdural hematoma  · Presumed unwitnessed fall  · Found down in front yard disheveled and covered in diffuse abrasions  · Transferred from [address]:  · CT head 9/4/20: Interval improvement in the previously seen intracranial hemorrhage, as described  No significant mass effect or midline shift  No new intracranial hemorrhage is seen  No other significant interval change  Plan:  · Continue monitor neurological exam   Currently GCS13, E4, V4, M5  Opens eyes spontaneously  Does not follow commands but can mimic  Moving all extremities without focal weakness  · Continue neurological exams  · STAT CT head with any neurological decline  · Hold all anti-platelet and anticoagulation medications  Patient's home medications unknown at this time  INR normal at 1 2  · Blood pressure control with recommended systolic blood pressure less than 160  · Completed Keppra for 7 days   · No neurosurgical intervention warranted at this time  · Mobilize patient as tolerated  · PT/OT recommending acute rehab when medically stable  · DVT prophylaxis: SCDs and SQ heparin     Neurosurgery will sign off, patient will follow-up in 4 weeks with CT head, call with any questions or concerns

## 2020-09-08 NOTE — PROGRESS NOTES
Progress Note - Codey Duarte 1945, 66 y o  male MRN: [id number]    [facility] Encounter: [id number]    Primary Care Provider: Terence Quintana MD   Date and time admitted to hospital: 8/28/2020 12:43 PM        [address]  - Suspected unwitnessed fall with unknown loss of consciousness and the below noted injuries  - Fall precautions  - Geriatric Medicine evaluation and recommendations appreciated  - PT and OT evaluation and treatment  - Case Management consultation for disposition planning/expected post acute care facility need  Awaiting placement  * TBI (traumatic brain injury) ([facility] [address] )  Assessment & Plan  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 as well as suspected trace subarachnoid hemorrhage in the left parietal region on initial CT scan  On repeat CT scan of head on 8/28/2020 following transferred to [facility], there appeared to be slight increase of the bilateral intraventricular hemorrhage, blossoming of the left parietal subarachnoid hemorrhage, bilateral subdural hematomas and suspected bleeding near the brainstem  - Repeat CT head on 8/30/2020 demonstrated improvement and/or stable hemorrhages  - Appreciate Neurosurgery evaluation and recommendations  No operative intervention recommended  - Repeat CTH 9/4: Interval improvement in the previously seen intracranial hemorrhage  No significant mass effect or midline shift  No new intracranial hemorrhage is seen  - Continue Keppra to complete a 7 day course, ended 9/4/2020   - Patient cleared for chemical DVT prophylaxis with subcutaneous heparin, all other anti-platelet and anticoagulant medications to be held until cleared by Neurosurgery  - Continue to monitor neurologic exam   - Continue PT and OT evaluation and treatment as indicated  - Outpatient follow-up with Neurosurgery      Encephalopathy acute  Assessment & Plan  - Acute encephalopathy on admission, likely multifactorial in setting of traumatic brain injury, multiple wounds with rhabdomyolysis and acute kidney injury, sepsis  Now significantly improved  - Blood cultures x2 with 1 of the tubes growing coagulase-negative Staphylococcus and Pantoea agglomerans  Patient was started on Ancef based on sensitivities  - Per ID, initial cultures likely a contaminant  Repeat cultures x2 negative at 72hrs, patient received 7 days abx, discontinued 9/4/2020   - Inpatient wound care evaluation and recommendations appreciated  - Delirium precautions  - Now appears to be at baseline mental status  Traumatic rhabdomyolysis (HCC)resolved as of 9/8/2020  Assessment & Plan  - Traumatic rhabdomyolysis, present on admission, with associated NAVID   - Rhabdomyolysis resolved  - May saline lock IV   - Continue to encourage oral intake and adequate hydration  Abrasions of multiple sites  Assessment & Plan  - Abrasions to multiple sites including all 4 extremities and his back  - Frequent repositioning, every 2 hours  - Inpatient Wound Care team following  Appreciate their recommendations  - Continue local wound care  Dementia [facility])  Assessment & Plan  - Chronic/baseline history of dementia  - Delirium precautions  - Geriatric Medicine evaluation and recommendations appreciated  - Continue current medication regimen   - Outpatient follow-up with PCP  Pressure injury of back, stage 1  Assessment & Plan  - Suspected stage I pressure injury of the back  - Inpatient Wound Care service evaluation and recommendations appreciated  - Continue with local wound care to multiple wound sites        Type 2 diabetes mellitus, without long-term current use of insulin (HCC)  Assessment & Plan    Lab Results   Component Value Date    HGBA1C 5 7 (H) 08/29/2020     - Documented history of type 2 diabetes mellitus with questionable use of metformin per only available medication list from February 2018  - Currently without hyperglycemia on initial lab workup and most recent hemoglobin A1c from March 2020 was 5 7  - Not on ISS, continue to monitor via routine labwork    Subarachnoid hemorrhage [facility])  Assessment & Plan  - Please see outlined management under TBI diagnosis  Subdural hematoma (HCC)  Assessment & Plan  - Please see outlined management under TBI diagnosis  IVH (intraventricular hemorrhage) ([facility] [address] )  Assessment & Plan  - Please see outlined management under TBI diagnosis  Blood culture positive for microorganism  Assessment & Plan  - Blood cultures x2 with 1 of the tube growing coagulase-negative Staphylococcus and Pantoea agglomerans  Patient was started on Ancef based on sensitivities  - Repeat blood cultures from 09/01/2020 remain negative to date  - Patient without fevers over the last 24 hours  - Antibiotics stopped by ID  Disposition:  Anticipate discharge to post acute care facility when placement available  Case Management continues to try to contact the patient's POA to assist with disposition planning  Continue PT and OT evaluation and treatment as indicated  SUBJECTIVE:  Chief Complaint:  I need to use my walker when I get up      Subjective:  Patient feels okay today  He offers no complaints, and denies having any pain  He is tolerating his diet without nausea, vomiting or constipation  No significant events overnight per Nursing        OBJECTIVE:     Meds/Allergies     Current Facility-Administered Medications:     acetaminophen (TYLENOL) tablet 650 mg, 650 mg, Oral, Q6H PRN, Ivett Flynn PA-C    brimonidine (ALPHAGAN P) 0 15 % ophthalmic solution 1 drop, 1 drop, Both Eyes, Q8H [address], Soledad Mcbride PA-C, 1 drop at 09/08/20 1400    chlorhexidine (PERIDEX) 0 12 % oral rinse 15 mL, 15 mL, Swish & Spit, Q12H [address], Cory Koroma PA-C, 15 mL at 09/08/20 1043    heparin (porcine) subcutaneous injection 5,000 Units, 5,000 Units, Subcutaneous, Q8H [facility], Maryana Haywood PA-C, 5,000 Units at 09/08/20 1357    lidocaine (LIDODERM) 5 % patch 1 patch, 1 patch, Topical, Daily, Dotty Montes PA-C, 1 patch at 09/08/20 1044    ondansetron (ZOFRAN) injection 4 mg, 4 mg, Intravenous, Q6H PRN, Dotty Montes PA-C    travoprost (TRAVATAN-Z) 0 004 % ophthalmic solution 1 drop, 1 drop, Both Eyes, HS, Soledad MEHRAN Mcbride, 1 drop at 09/07/20 2152    ziprasidone (GEODON) capsule 60 mg, 60 mg, Oral, Daily, Maryana Haywood PA-C, 60 mg at 09/08/20 1045    ziprasidone (GEODON) capsule 80 mg, 80 mg, Oral, QPM, Maryana Haywood PA-C, 80 mg at 09/07/20 1732     Vitals:   Vitals:    09/08/20 1112   BP:    Pulse:    Resp:    Temp:    SpO2: 99%       Intake/Output:  I/O       09/06 0701 - 09/07 0700 09/07 0701 - 09/08 0700 09/08 0701 - 09/09 0700    P  O  1530 920 540    Total Intake(mL/kg) 1530 (21 9) 920 (13 2) 540 (7 7)    Urine (mL/kg/hr) 150 (0 1) 1400 (0 8)     Stool       Total Output 150 1400     Net +1380 -480 +540           Unmeasured Urine Occurrence 4 x 8 x 1 x           Nutrition/GI Proph/Bowel Reg:  Regular diet    Physical Exam:   GENERAL APPEARANCE: Patient in no acute distress  HEENT: NCAT; EOMs intact  CV: Regular rate and rhythm; no murmur/gallops/rubs appreciated  CHEST / LUNGS: Clear to auscultation; no wheezes/rales/rhonci  No chest tenderness  ABD: NABS; soft; non-distended; non-tender  EXT: +2 pulses bilaterally upper & lower extremities; no edema  NEURO: GCS 14 (E4, V4, M6); no focal neurologic deficits; neurovascularly intact  SKIN: Warm, dry and well perfused; no rash; no jaundice  Healing abrasions on all 4 extremities with no tenderness  Invasive Devices     Peripheral Intravenous Line            Peripheral IV 09/05/20 Distal;Left;Ventral (anterior) Forearm 2 days          Drain            External Urinary Catheter Small 2 days                 Lab Results: Results: I have personally reviewed pertinent reports  Imaging/EKG Studies: Results: I have personally reviewed pertinent reports      Other Studies: N/A  VTE Prophylaxis: Sequential compression device (Venodyne)  and Heparin     Jovita Walter PA-C  9/8/2020 10:43 AM

## 2020-09-08 NOTE — ASSESSMENT & PLAN NOTE
- Blood cultures x2 with 1 of the tube growing coagulase-negative Staphylococcus and Pantoea agglomerans  Patient was started on Ancef based on sensitivities  - Repeat blood cultures from 09/01/2020 remain negative to date  - Patient without fevers over the last 24 hours  - Antibiotics stopped by ID

## 2020-09-08 NOTE — SOCIAL WORK
Voicemail left for Marquez to see if he could provide the 's telephone number oh who signed the 201 as CM has been unable to reach pt's documented POA

## 2020-09-08 NOTE — SOCIAL WORK
CM spoke to Amanda regarding the pt's POA  He provided CM with the pt's  who filled out the POA paperwork  Lazara Neal and [employer]cel[employer] Keith ( firm) [phone]  CM will contact them tomorrow

## 2020-09-08 NOTE — PLAN OF CARE
Problem: Potential for Falls  Goal: Patient will remain free of falls  Description: INTERVENTIONS:  - Assess patient frequently for physical needs  -  Identify cognitive and physical deficits and behaviors that affect risk of falls    -  [place] fall precautions as indicated by assessment   - Educate patient/family on patient safety including physical limitations  - Instruct patient to call for assistance with activity based on assessment  - Modify environment to reduce risk of injury  - Consider OT/PT consult to assist with strengthening/mobility  Outcome: Progressing     Problem: PAIN - ADULT  Goal: Verbalizes/displays adequate comfort level or baseline comfort level  Description: Interventions:  - Encourage patient to monitor pain and request assistance  - Assess pain using appropriate pain scale  - Administer analgesics based on type and severity of pain and evaluate response  - Implement non-pharmacological measures as appropriate and evaluate response  - Consider cultural and social influences on pain and pain management  - Notify physician/advanced practitioner if interventions unsuccessful or patient reports new pain  Outcome: Progressing     Problem: INFECTION - ADULT  Goal: Absence or prevention of progression during hospitalization  Description: INTERVENTIONS:  - Assess and monitor for signs and symptoms of infection  - Monitor lab/diagnostic results  - Monitor all insertion sites, i e  indwelling lines, tubes, and drains  - Monitor endotracheal if appropriate and nasal secretions for changes in amount and color  - [place] appropriate cooling/warming therapies per order  - Administer medications as ordered  - Instruct and encourage patient and family to use good hand hygiene technique  - Identify and instruct in appropriate isolation precautions for identified infection/condition  Outcome: Progressing     Problem: SAFETY ADULT  Goal: Patient will remain free of falls  Description: INTERVENTIONS:  - Assess patient frequently for physical needs  -  Identify cognitive and physical deficits and behaviors that affect risk of falls    -  [place] fall precautions as indicated by assessment   - Educate patient/family on patient safety including physical limitations  - Instruct patient to call for assistance with activity based on assessment  - Modify environment to reduce risk of injury  - Consider OT/PT consult to assist with strengthening/mobility  Outcome: Progressing  Goal: Maintain or return to baseline ADL function  Description: INTERVENTIONS:  -  Assess patient's ability to carry out ADLs; assess patient's baseline for ADL function and identify physical deficits which impact ability to perform ADLs (bathing, care of mouth/teeth, toileting, grooming, dressing, etc )  - Assess/evaluate cause of self-care deficits   - Assess range of motion  - Assess patient's mobility; develop plan if impaired  - Assess patient's need for assistive devices and provide as appropriate  - Encourage maximum independence but intervene and supervise when necessary  - Involve family in performance of ADLs  - Assess for home care needs following discharge   - Consider OT consult to assist with ADL evaluation and planning for discharge  - Provide patient education as appropriate  Outcome: Progressing  Goal: Maintain or return mobility status to optimal level  Description: INTERVENTIONS:  - Assess patient's baseline mobility status (ambulation, transfers, stairs, etc )    - Identify cognitive and physical deficits and behaviors that affect mobility  - Identify mobility aids required to assist with transfers and/or ambulation (gait belt, sit-to-stand, lift, walker, cane, etc )  - [place] fall precautions as indicated by assessment  - Record patient progress and toleration of activity level on Mobility SBAR; progress patient to next Phase/Stage  - Instruct patient to call for assistance with activity based on assessment  - Consider rehabilitation consult to assist with strengthening/weightbearing, etc   Outcome: Progressing     Problem: DISCHARGE PLANNING  Goal: Discharge to home or other facility with appropriate resources  Description: INTERVENTIONS:  - Identify barriers to discharge w/patient and caregiver  - Arrange for needed discharge resources and transportation as appropriate  - Identify discharge learning needs (meds, wound care, etc )  - Arrange for interpretive services to assist at discharge as needed  - Refer to Case Management Department for coordinating discharge planning if the patient needs post-hospital services based on physician/advanced practitioner order or complex needs related to functional status, cognitive ability, or social support system  Outcome: Progressing     Problem: Knowledge Deficit  Goal: Patient/family/caregiver demonstrates understanding of disease process, treatment plan, medications, and discharge instructions  Description: Complete learning assessment and assess knowledge base    Interventions:  - Provide teaching at level of understanding  - Provide teaching via preferred learning methods  Outcome: Progressing     Problem: NEUROSENSORY - ADULT  Goal: Achieves stable or improved neurological status  Description: INTERVENTIONS  - Monitor and report changes in neurological status  - Monitor vital signs such as temperature, blood pressure, glucose, and any other labs ordered   - Initiate measures to prevent increased intracranial pressure  - Monitor for seizure activity and implement precautions if appropriate      Outcome: Progressing  Goal: Remains free of injury related to seizures activity  Description: INTERVENTIONS  - Maintain airway, patient safety  and administer oxygen as ordered  - Monitor patient for seizure activity, document and report duration and description of seizure to physician/advanced practitioner  - If seizure occurs,  ensure patient safety during seizure  - Reorient patient post seizure  - Seizure pads on all 4 side rails  - Instruct patient/family to notify RN of any seizure activity including if an aura is experienced  - Instruct patient/family to call for assistance with activity based on nursing assessment  - Administer anti-seizure medications if ordered    Outcome: Progressing  Goal: Achieves maximal functionality and self care  Description: INTERVENTIONS  - Monitor swallowing and airway patency with patient fatigue and changes in neurological status  - Encourage and assist patient to increase activity and self care     - Encourage visually impaired, hearing impaired and aphasic patients to use assistive/communication devices  Outcome: Progressing     Problem: SKIN/TISSUE INTEGRITY - ADULT  Goal: Skin integrity remains intact  Description: INTERVENTIONS  - Identify patients at risk for skin breakdown  - Assess and monitor skin integrity  - Assess and monitor nutrition and hydration status  - Monitor labs (i e  albumin)  - Assess for incontinence   - Turn and reposition patient  - Assist with mobility/ambulation  - Relieve pressure over bony prominences  - Avoid friction and shearing  - Provide appropriate hygiene as needed including keeping skin clean and dry  - Evaluate need for skin moisturizer/barrier cream  - Collaborate with interdisciplinary team (i e  Nutrition, Rehabilitation, etc )   - Patient/family teaching  Outcome: Progressing  Goal: Incision(s), wounds(s) or drain site(s) healing without S/S of infection  Description: INTERVENTIONS  - Assess and document risk factors for skin impairment   - Assess and document dressing, incision, wound bed, drain sites and surrounding tissue  - Consider nutrition services referral as needed  - Oral mucous membranes remain intact  - Provide patient/ family education  Outcome: Progressing  Goal: Oral mucous membranes remain intact  Description: INTERVENTIONS  - Assess oral mucosa and hygiene practices  - Implement preventative oral hygiene regimen  - Implement oral medicated treatments as ordered  - Initiate Nutrition services referral as needed  Outcome: Progressing     Problem: HEMATOLOGIC - ADULT  Goal: Maintains hematologic stability  Description: INTERVENTIONS  - Assess for signs and symptoms of bleeding or hemorrhage  - Monitor labs  - Administer supportive blood products/factors as ordered and appropriate  Outcome: Progressing     Problem: Prexisting or High Potential for Compromised Skin Integrity  Goal: Skin integrity is maintained or improved  Description: INTERVENTIONS:  - Identify patients at risk for skin breakdown  - Assess and monitor skin integrity  - Assess and monitor nutrition and hydration status  - Monitor labs   - Assess for incontinence   - Turn and reposition patient  - Assist with mobility/ambulation  - Relieve pressure over bony prominences  - Avoid friction and shearing  - Provide appropriate hygiene as needed including keeping skin clean and dry  - Evaluate need for skin moisturizer/barrier cream  - Collaborate with interdisciplinary team   - Patient/family teaching  - Consider wound care consult   Outcome: Progressing     Problem: SAFETY,RESTRAINT: NV/NON-SELF DESTRUCTIVE BEHAVIOR  Goal: Remains free of harm/injury (restraint for non violent/non self-detsructive behavior)  Description: INTERVENTIONS:  - Instruct patient/family regarding restraint use   - Assess and monitor physiologic and psychological status   - Provide interventions and comfort measures to meet assessed patient needs   - Identify and implement measures to help patient regain control  - Assess readiness for release of restraint   Outcome: Progressing  Goal: Returns to optimal restraint-free functioning  Description: INTERVENTIONS:  - Assess the patient's behavior and symptoms that indicate continued need for restraint  - Identify and implement measures to help patient regain control  - Assess readiness for release of restraint   Outcome: Progressing Problem: COPING  Goal: Pt/Family able to verbalize concerns and demonstrate effective coping strategies  Description: INTERVENTIONS:  - Assist patient/family to identify coping skills, available support systems and cultural and spiritual values  - Provide emotional support, including active listening and acknowledgement of concerns of patient and caregivers  - Reduce environmental stimuli, as able  - Provide patient education  - Assess for spiritual pain/suffering and initiate spiritual care, including notification of Pastoral Care or afshan based community as needed  - Assess effectiveness of coping strategies  Outcome: Progressing     Problem: DECISION MAKING  Goal: Pt/Family able to effectively weigh alternatives and participate in decision making related to treatment and care  Description: INTERVENTIONS:  - Identify decision maker  - Determine when there are differences among patient's view, family's view, and healthcare provider's view of patient condition and care goals  - Facilitate patient/family articulation of goals for care  - Help patient/family identify pros/cons of alternative solutions  - Provide information as requested by patient/family  - Respect patient/family rights related to privacy and sharing information   - Serve as a liaison between patient, family and health care team  - Initiate consults as appropriate (Ethics Team, Palliative Care, Family Care Conference, etc )  Outcome: Progressing     Problem: BEHAVIOR  Goal: Pt/Family maintain appropriate behavior and adhere to behavioral management agreement, if implemented  Description: INTERVENTIONS:  - Assess the family dynamic   - Encourage verbalization of thoughts and concerns in a socially appropriate manner  - Assess patient/family's coping skills and non-compliant behavior (including use of illegal substances)    - Utilize positive, consistent limit setting strategies supporting safety of patient, staff and others  - Initiate consult with Case Management, Spiritual Care or other ancillary services as appropriate  - If a patient's/visitor's behavior jeopardizes the safety of the patient, staff, or others, refer to organization procedure  - Notify Security of behavior or suspected illegal substances which indicate the need for search of the patient and/or belongings  - Encourage participation in the decision making process about a behavioral management agreement; implement if patient meets criteria  Outcome: Progressing     Problem: Nutrition/Hydration-ADULT  Goal: Nutrient/Hydration intake appropriate for improving, restoring or maintaining nutritional needs  Description: Monitor and assess patient's nutrition/hydration status for malnutrition  Collaborate with interdisciplinary team and initiate plan and interventions as ordered  Monitor patient's weight and dietary intake as ordered or per policy  Utilize nutrition screening tool and intervene as necessary  Determine patient's food preferences and provide high-protein, high-caloric foods as appropriate       INTERVENTIONS:  - Monitor oral intake, urinary output, labs, and treatment plans  - Assess nutrition and hydration status and recommend course of action  - Evaluate amount of meals eaten  - Assist patient with eating if necessary   - Allow adequate time for meals  - Recommend/ encourage appropriate diets, oral nutritional supplements, and vitamin/mineral supplements  - Order, calculate, and assess calorie counts as needed  - Recommend, monitor, and adjust tube feedings and TPN/PPN based on assessed needs  - Assess need for intravenous fluids  - Provide specific nutrition/hydration education as appropriate  - Include patient/family/caregiver in decisions related to nutrition  Outcome: Progressing

## 2020-09-08 NOTE — ASSESSMENT & PLAN NOTE
Imaging:  · CTA head and neck 8/28/20: Grossly stable multifocal extra-axial hemorrhage as described  Stable hypoattenuating subdural collections over bilateral cerebral hemispheres  No significant mass effect  Stable small layering blood within posterior horn of bilateral lateral ventricles  Patent major vasculature of [place] without significant stenosis  No aneurysm  No hemodynamically significant stenosis of major cervical vasculature        Plan:  · Continue fall precautions  · PT/OT recommending acute rehab when medically stable  · Geriatrics following, input appreciated

## 2020-09-08 NOTE — ASSESSMENT & PLAN NOTE
· BC from 8/28 x1 was positive for Staphylococcus and Pantoea agglomerans, likely a contaminant  · BC x2 from 9/1 show no growth after 5 days  · Patient remains afebrile  · ID following, input appreciated  · Antibiotics were stopped on 9/3  · Continue to monitor

## 2020-09-08 NOTE — TELEPHONE ENCOUNTER
09/23/2020-CALLED [address], SPOKE TO [place], CONFIRMED 9/28/2020 CT APT AND 10/06/2020 F/U APT       09/16/2020-PT DISCHARGED TO [facility]    09/15/2020-PT [facility] 25    09/11/2020-PT [facility] 25    09/10/2020-PT [facility] 25    09/09/2020-PT [place] [id number]  10/06/2020 APT W/CT HEAD    09/08/2020-PT STILL IN HOSPITAL,  TODAY'S APT CX-Hospitalized (pt still in hospital, will be images will be reviewed by Neurosurgery PA)

## 2020-09-08 NOTE — RESTORATIVE TECHNICIAN NOTE
Restorative Specialist Mobility Note       Activity: Ambulate in [place]     Assistive Device: Front wheel walker

## 2020-09-08 NOTE — PROGRESS NOTES
Progress Note - Amanda Hendrix 1945, 66 y o  male MRN: [id number]    Unit/Bed#: [facility] 603-01 Encounter: [id number]    Primary Care Provider: Mulugeta Lorenzo MD   Date and time admitted to hospital: 8/28/2020 12:43 PM    Patient seen today for 2 week follow-up with repeat CT head    Subdural hematoma [facility])  Assessment & Plan  Patient seen for 2 week follow-up  Bilateral subdural hematoma  · Presumed unwitnessed fall  · Found down in front yard disheveled and covered in diffuse abrasions  · Transferred from [address]:  · CT head 9/4/20: Interval improvement in the previously seen intracranial hemorrhage, as described  No significant mass effect or midline shift  No new intracranial hemorrhage is seen  No other significant interval change  Plan:  · Continue monitor neurological exam   Currently GCS13, E4, V4, M5  Opens eyes spontaneously  Does not follow commands but can mimic  Moving all extremities without focal weakness  · Continue neurological exams  · STAT CT head with any neurological decline  · Hold all anti-platelet and anticoagulation medications  Patient's home medications unknown at this time  INR normal at 1 2  · Blood pressure control with recommended systolic blood pressure less than 160  · Completed Keppra for 7 days   · No neurosurgical intervention warranted at this time  · Mobilize patient as tolerated  · PT/OT recommending acute rehab when medically stable  · DVT prophylaxis: SCDs and SQ heparin     Neurosurgery will sign off, patient will follow-up in 4 weeks with CT head, call with any questions or concerns      Blood culture positive for microorganism  Assessment & Plan  · BC from 8/28 x1 was positive for Staphylococcus and Pantoea agglomerans, likely a contaminant  · BC x2 from 9/1 show no growth after 5 days  · Patient remains afebrile  · ID following, input appreciated  · Antibiotics were stopped on 9/3  · Continue to monitor    IVH (intraventricular hemorrhage) [facility])  Assessment & Plan  See plan as above    Subarachnoid hemorrhage [facility])  Assessment & Plan  S/p fall  See plan as above    Fall  Assessment & Plan  Imaging:  · CTA head and neck 8/28/20: Grossly stable multifocal extra-axial hemorrhage as described  Stable hypoattenuating subdural collections over bilateral cerebral hemispheres  No significant mass effect  Stable small layering blood within posterior horn of bilateral lateral ventricles  Patent major vasculature of [place] without significant stenosis  No aneurysm  No hemodynamically significant stenosis of major cervical vasculature  Plan:  · Continue fall precautions  · PT/OT recommending acute rehab when medically stable  · Geriatrics following, input appreciated    * TBI (traumatic brain injury) [facility])  Assessment & Plan  See plan as above  Subjective/Objective      Chief Complaint: "I dont drive, how will I get here?"    Subjective:  Patient has no current complaints at this time  He denies any headaches, dizziness, blurry vision, chest pain, shortness of breath, abdominal pain, nausea, vomiting, diarrhea, no problems with bowel or bladder, no new weakness or numbness/tingling  Objective:  Patient comfortably sitting on recliner, NAD  Patient very repetitive  I/O       09/06 0701 - 09/07 0700 09/07 0701 - 09/08 0700 09/08 0701 - 09/09 0700    P  O  1530 920     Total Intake(mL/kg) 1530 (21 9) 920 (13 2)     Urine (mL/kg/hr) 150 (0 1) 1400 (0 8)     Stool       Total Output 150 1400     Net +1380 -480            Unmeasured Urine Occurrence 4 x 8 x           Invasive Devices     Peripheral Intravenous Line            Peripheral IV 09/05/20 Distal;Left;Ventral (anterior) Forearm 2 days          Drain            External Urinary Catheter Small 2 days                Physical Exam:  Vitals: Blood pressure 118/80, pulse 103, temperature 98 3 °F (36 8 °C), temperature source Oral, resp   rate 19, height 5' 7" (1 702 m), weight 69 8 kg (153 lb 14 1 oz), SpO2 99 %  ,Body mass index is 24 1 kg/m²  General appearance: alert, appears stated age, cooperative and no distress  Head: Normocephalic, without obvious abnormality, atraumatic  Eyes: EOMI, L pupil 4 reactive, R pupil 4 non reactive   Lungs: non labored breathing  Heart:  Tachycardic  Neurologic:   Mental status: Alert, oriented x3, thought content appropriate, speech is clear, not following commands but able to mimic but not consistently following commands  Patient does not answer questions appropriately  Cranial nerves: grossly intact (Cranial nerves II-XII), patient very hard of hearing  Sensory:  Grossly intact to crude touch in all extremities  Motor:  Grossly moving all extremities without focal weakness  Reflexes: 2+ and symmetric, no Gonzalez's or clonus appreciated      Lab Results:  Results from last 7 days   Lab Units 09/06/20  1216 09/03/20  1003 09/02/20  0457   WBC Thousand/uL 15 08* 10 24* 13 47*   HEMOGLOBIN g/dL 11 9* 10 6* 10 4*   HEMATOCRIT % 36 1* 31 9* 32 0*   PLATELETS Thousands/uL 392* 294 276   NEUTROS PCT %  --   --  73   MONOS PCT %  --   --  11     Results from last 7 days   Lab Units 09/06/20  1217 09/03/20  1004 09/02/20  0457   POTASSIUM mmol/L 4 0 3 4* 3 3*   CHLORIDE mmol/L 107 106 107   CO2 mmol/L 28 28 31   BUN mg/dL 17 12 10   CREATININE mg/dL 0 74 0 61 0 54*   CALCIUM mg/dL 8 9 8 5 8 0*     Results from last 7 days   Lab Units 09/02/20  0457   MAGNESIUM mg/dL 2 1     Results from last 7 days   Lab Units 09/02/20  0457   PHOSPHORUS mg/dL 2 8         No results found for: TROPONINT  ABG:  Lab Results   Component Value Date    PHART 7 270 (L) 08/28/2020    BVF5ZUV 41 9 08/28/2020    PO2ART 138 0 (H) 08/28/2020    IUL3WIN 18 6 (L) 08/28/2020    BEART -7 6 (L) 08/28/2020    SOURCE Radial, Right 08/28/2020       Imaging Studies: I have personally reviewed pertinent reports     and I have personally reviewed pertinent films in PACS    Cta Head And Neck W Wo Contrast    Result Date: 8/28/2020  Impression: 1  Grossly stable multifocal extra-axial hemorrhage as described  Stable hypoattenuating subdural collections over bilateral cerebral hemispheres  No significant mass effect  2   Stable small layering blood within posterior horn of bilateral lateral ventricles  3   Patent major vasculature of [place] of [place] without significant stenosis  No aneurysm  4   No hemodynamically significant stenosis of major cervical vasculature  Workstation performed: [id number]     Xr Chest Portable    Result Date: 8/29/2020  Impression: No active pulmonary disease  Workstation performed: [id number]     Xr Shoulder 2+ Vw Right    Result Date: 8/29/2020  Impression: Possible nondisplaced fracture of the right humeral head  Limited study  Further evaluation with a complete study or follow-up CT scan recommended  The right elbow is unremarkable  The study was marked in [facility] for immediate notification  Workstation performed: [id number]     Xr Elbow 3+ Vw Right    Result Date: 8/29/2020  Impression: Possible nondisplaced fracture of the right humeral head  Limited study  Further evaluation with a complete study or follow-up CT scan recommended  The right elbow is unremarkable  The study was marked in [facility] for immediate notification  Workstation performed: [id number]     Xr Knee 4+ Vw Left Injury    Result Date: 8/28/2020  Impression: Healed fracture deformities proximal tibia and fibula No acute osseous abnormality  Workstation performed: [id number]     Xr Knee 4+ Vw Right Injury    Result Date: 8/28/2020  Impression: No acute osseous abnormality  Workstation performed: [id number]     Ct Head Wo Contrast    Result Date: 8/31/2020  Impression: 1  Slight interval improvement in multifocal extra-axial hemorrhage as described  No new acute intracranial hemorrhage   2   Stable size with interval decreased attenuation of hypodense subdural collections overlying bilateral cerebral hemispheres  Workstation performed: [id number]     Ct Head Wo Contrast    Result Date: 8/28/2020  Impression: Small amount of acute intracranial hemorrhage, slightly increased from the recent prior study with larger amount of intraventricular blood and left subarachnoid hemorrhage noted as well as bilateral small low-density subdural collections  No critical mass effect or acute infarction or parenchymal hemorrhage identified  The study was marked in [facility] for immediate notification  Workstation performed: [id number]     Trauma - Ct Head Wo Contrast    Result Date: 8/28/2020  Impression: Small amount of layering intraventricular hemorrhage occipital horns bilaterally  Question trace gyriform or subarachnoid blood left parietal region series 2/26  I personally discussed this study with NIRU MACKAY on 8/28/2020 at 9:49 AM  Workstation performed: [id number]     Trauma - Ct Spine Cervical Wo Contrast    Result Date: 8/28/2020  Impression: No cervical spine fracture or traumatic malalignment  Workstation performed: [id number]     Ct Spine Lumbar Without Contrast    Result Date: 8/28/2020  Impression: Mild multilevel degenerative spondylosis No acute traumatic injury identified Small right-sided disc protrusion L4-5 Nonobstructive upper pole left renal calculus Workstation performed: [id number]     Trauma - Ct Chest Abdomen Pelvis W Contrast    Result Date: 8/28/2020  Impression: 1  No acute traumatic injury identified within the chest  2   No intra-abdominal solid organ injury  3   Ill-defined hyperattenuating foci in the small bowel mesentery raises possibility of small mesenteric hematomas  Other considerations include adenopathy or carcinoid tumors although felt somewhat less likely  Attention on follow-up is recommended and  surgical consultation is advised  No free air or evidence of diffuse hemoperitoneum  4  Indeterminate left renal hypodensities  Nonemergent ultrasound correlation recommended   5  Question age-indeterminate nondisplaced fracture of the right anterior fifth and sixth ribs  6  Prostatomegaly  7  Left inguinal hernia containing fat and a portion of sigmoid colon  I personally discussed this study with NIRU MACKAY on 8/28/2020 at 9:49 AM  Workstation performed: [id number]       EKG, Pathology, and Other Studies: I have personally reviewed pertinent reports        VTE Pharmacologic Prophylaxis: Heparin    VTE Mechanical Prophylaxis: sequential compression device

## 2020-09-08 NOTE — PROGRESS NOTES
Progress Note - Geriatric Medicine   Natacha Colon  66 y o  male MRN: [id number]  Unit/Bed#: [facility] 603-01 Encounter: [id number]      Assessment/Plan:    Subdural hematoma/subarachnoid hematoma  -suspected due to fall prior to admission given patient's injuries on admission   Neurosurgery following  -s/p completion of 7 day course of Keppra  -require outpatient follow-up in 4 weeks with Neurosurgery, repeat CT head prior to appointment    Traumatic brain injury  -continue supportive cares, reorient and redirect as appropriate  -agree with recommendation of short-term rehab discharge  -recommend cognitive evaluation following completion of rehab to determine baseline and next appropriate step for recovery    Suspected bipolar disorder  -unable to confirm with patient's PCP but was able to confirm medication regimen with outpatient pharmacy  -recommend close outpatient follow-up with PCP/Psychiatry for medication management monitoring    Impaired hearing  -patient reports that he uses hearing aids but are not present at bedside, communication preference is by staff writing on dry erase board and pt responds verbally     Ambulatory dysfunction with fall  -requires use of walker for ambulation  -remains impulsive  -continue fall precautions  -assist with all tx and encourage good body mechanics    Delirium precautions  -maintain normal circadian rhythm  -ensure pain well controlled  -monitor for fecal and urinary retention  -redirected and reoriented as appropriate    Home safety concerns  -pt reportedly resides at home alone prior to admission, concern that given presentation pt unable to safely care for self at home   -anticipate STR on dc  -CM following and assisting with dispo    Subjective:     Patient seen examined at bedside where he is sitting resting comfortably does having finished breakfast   He states that his backside is somewhat sore today but denies other complaints    He has been able to communicate clearly with staff riding on whiteboard and him answering verbally which he confirms is the easiest way to communicate with him at this time  Review of Systems   Constitutional: Positive for chills  Negative for appetite change and fever  HENT: Positive for dental problem and hearing loss (severe impairment, does not have hearing aids with him)  Eyes: Positive for visual disturbance  Respiratory: Negative for shortness of breath and wheezing  Cardiovascular: Negative for chest pain and palpitations  Gastrointestinal: Negative for abdominal pain, nausea and vomiting  Endocrine: Negative  Genitourinary: Negative  Musculoskeletal:        "backside sore"   Skin: Negative  Allergic/Immunologic: Negative  Neurological: Negative  Hematological: Negative  Psychiatric/Behavioral: Negative  All other systems reviewed and are negative  Objective:     Vitals: Blood pressure 118/80, pulse 103, temperature 98 3 °F (36 8 °C), temperature source Oral, resp  rate 19, height 5' 7" (1 702 m), weight 69 8 kg (153 lb 14 1 oz), SpO2 99 %  ,Body mass index is 24 1 kg/m²  Intake/Output Summary (Last 24 hours) at 9/8/2020 1325  Last data filed at 9/8/2020 1230  Gross per 24 hour   Intake 860 ml   Output 150 ml   Net 710 ml     Current Medications: Reviewed    Physical Exam:   Physical Exam  Vitals signs and nursing note reviewed  Constitutional:       Comments: No acute distress sitting in chair side bed resting comfortably   HENT:      Head: Normocephalic  Comments: Poor dentition, mucous membranes dry     Nose: Nose normal       Mouth/Throat:      Mouth: Mucous membranes are dry  Eyes:      Conjunctiva/sclera: Conjunctivae normal       Comments: Dysconjugate gaze which appears to be chronic   Neck:      Musculoskeletal: Neck supple  Comments: Trachea appears midline  Cardiovascular:      Rate and Rhythm: Normal rate  Pulses: Normal pulses     Pulmonary:      Breath sounds: Normal breath sounds  Comments: No increased work of breathing, no rales or rhonchi  Abdominal:      General: Bowel sounds are normal  There is no distension  Palpations: Abdomen is soft  Tenderness: There is no abdominal tenderness  Musculoskeletal:      Comments: Normal overall muscle mass  Moves all extremities spontaneously   Skin:     General: Skin is warm and dry  Comments: Unshaven unkempt   Neurological:      Mental Status: He is alert  Comments: Awake and alert, follows commands, able to read from whiteboard and follow directions appropriately   Psychiatric:      Comments: Patient perseverating on wanting to call friend, difficult to redirect but is not agitated or aggressive        Invasive Devices     Peripheral Intravenous Line            Peripheral IV 09/05/20 Distal;Left;Ventral (anterior) Forearm 2 days          Drain            External Urinary Catheter Small 2 days              Lab, Imaging and other studies: I have personally reviewed pertinent reports

## 2020-09-08 NOTE — ASSESSMENT & PLAN NOTE
- Traumatic rhabdomyolysis, present on admission, with associated NAVID   - Rhabdomyolysis resolved  - May saline lock IV   - Continue to encourage oral intake and adequate hydration

## 2020-09-09 PROBLEM — R79.89 BLOOD CULTURE POSITIVE FOR MICROORGANISM: Status: RESOLVED | Noted: 2020-09-02 | Resolved: 2020-09-09

## 2020-09-09 PROCEDURE — 97530 THERAPEUTIC ACTIVITIES: CPT

## 2020-09-09 PROCEDURE — 99232 SBSQ HOSP IP/OBS MODERATE 35: CPT | Performed by: SURGERY

## 2020-09-09 PROCEDURE — 97116 GAIT TRAINING THERAPY: CPT

## 2020-09-09 RX ADMIN — BRIMONIDINE TARTRATE 1 DROP: 1.5 SOLUTION OPHTHALMIC at 06:20

## 2020-09-09 RX ADMIN — HEPARIN SODIUM 5000 UNITS: 5000 INJECTION INTRAVENOUS; SUBCUTANEOUS at 14:51

## 2020-09-09 RX ADMIN — HEPARIN SODIUM 5000 UNITS: 5000 INJECTION INTRAVENOUS; SUBCUTANEOUS at 22:40

## 2020-09-09 RX ADMIN — BRIMONIDINE TARTRATE 1 DROP: 1.5 SOLUTION OPHTHALMIC at 14:51

## 2020-09-09 RX ADMIN — LIDOCAINE 5% 1 PATCH: 700 PATCH TOPICAL at 09:19

## 2020-09-09 RX ADMIN — CHLORHEXIDINE GLUCONATE 0.12% ORAL RINSE 15 ML: 1.2 LIQUID ORAL at 22:40

## 2020-09-09 RX ADMIN — ZIPRASIDONE HYDROCHLORIDE 60 MG: 20 CAPSULE ORAL at 09:19

## 2020-09-09 RX ADMIN — CHLORHEXIDINE GLUCONATE 0.12% ORAL RINSE 15 ML: 1.2 LIQUID ORAL at 09:19

## 2020-09-09 RX ADMIN — ZIPRASIDONE HYDROCHLORIDE 80 MG: 40 CAPSULE ORAL at 17:05

## 2020-09-09 RX ADMIN — HEPARIN SODIUM 5000 UNITS: 5000 INJECTION INTRAVENOUS; SUBCUTANEOUS at 06:20

## 2020-09-09 RX ADMIN — BRIMONIDINE TARTRATE 1 DROP: 1.5 SOLUTION OPHTHALMIC at 22:40

## 2020-09-09 RX ADMIN — TRAVOPROST 1 DROP: 0.04 SOLUTION/ DROPS OPHTHALMIC at 22:40

## 2020-09-09 NOTE — ASSESSMENT & PLAN NOTE
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 as well as suspected trace subarachnoid hemorrhage in the left parietal region on initial CT scan  On repeat CT scan of head on 8/28/2020 following transferred to [facility], there appeared to be slight increase of the bilateral intraventricular hemorrhage, blossoming of the left parietal subarachnoid hemorrhage, bilateral subdural hematomas and suspected bleeding near the brainstem  - Repeat CT head on 8/30/2020 demonstrated improvement and/or stable hemorrhages  - Appreciate Neurosurgery evaluation and recommendations  No operative intervention recommended  - Repeat CTH 9/4: Interval improvement in the previously seen intracranial hemorrhage  No significant mass effect or midline shift  No new intracranial hemorrhage is seen  - Continue Keppra to complete a 7 day course, ended 9/4/2020   - Patient cleared for chemical DVT prophylaxis with subcutaneous heparin, all other anti-platelet and anticoagulant medications to be held until cleared by Neurosurgery  - Continue to monitor neurologic exam   - Continue PT and OT evaluation and treatment as indicated    - Outpatient follow-up with Neurosurgery in 1-2wks

## 2020-09-09 NOTE — PHYSICAL THERAPY NOTE
Physical Therapy Treatment Note     09/09/20 1045   Pain Assessment   Pain Assessment Tool 0-10   Pain Score No Pain   Restrictions/Precautions   Weight Bearing Precautions Per Order Yes   RUE Weight Bearing Per Order WBAT   Other Precautions Cognitive; Chair Alarm; Bed Alarm; Fall Risk;Hard of hearing;Telemetry;WBS   General   Chart Reviewed Yes   Family/Caregiver Present No   Cognition   Overall Cognitive Status WFL   Subjective   Subjective Pt  sleeping in bed upon entry  Communicated with patient via writing pad as pt  is extremly [place]  Pt  agreeable to PT   Bed Mobility   Supine to Sit 5  Supervision   Additional items Assist x 1;HOB elevated; Bedrails   Sit to Supine 5  Supervision   Additional items Assist x 1;HOB elevated; Bedrails   Transfers   Sit to Stand 4  Minimal assistance   Additional items Assist x 1;Verbal cues;Armrests   Stand to Sit 4  Minimal assistance   Additional items Assist x 1;Verbal cues;Armrests; Increased time required   Stand pivot 4  Minimal assistance   Additional items Other;Verbal cues; Increased time required;Assist x 1; Armrests  (with RW)   Ambulation/Elevation   Gait pattern Excessively slow; Short stride; Inconsistent lisette;Narrow INA; Decreased foot clearance; Improper Weight shift   Gait Assistance 4  Minimal assist   Additional items Assist x 1;Verbal cues   Assistive Device Rolling walker   Distance 260ft, 160ft   Balance   Static Sitting Fair +   Ambulatory Fair -   Endurance Deficit   Endurance Deficit No   Activity Tolerance   Activity Tolerance Patient tolerated treatment well   Nurse Made Aware Yes   Exercises   THR Sitting;Standing;20 reps;AROM; Bilateral   Assessment   Prognosis Good   Problem List Decreased strength;Decreased range of motion; Impaired balance;Decreased mobility; Decreased cognition; Impaired judgement;Decreased safety awareness;Pain;Decreased skin integrity; Impaired hearing   Assessment Pt  making good progress with mobility   Pt  was demonstarted gait and patient showed fair carry over  Decreased safety awareness, and also pt  needed guidance to get closer to chair prior to descending to chair during stand pivot transfer  gait continues to slightly unsteady and needs Min A for safe mobility, though no overt LOB noted  Will [facility]   Barriers to Discharge Decreased caregiver support   Goals   Patient Goals None reported   STG Expiration Date 09/10/20   PT Treatment Day 3   Plan   Treatment/Interventions Functional transfer training;LE strengthening/ROM; Therapeutic exercise;Cognitive reorientation;Patient/family training;Equipment eval/education; Bed mobility;Gait training;Spoke to nursing   Progress Progressing toward goals   PT Frequency Other (Comment)  (3-6x/week)   Recommendation   PT Discharge Recommendation Post-Acute Rehabilitation Services   Equipment Recommended Marti Casas   PT - OK to Discharge Yes         Mehnaz Lobato, PTA

## 2020-09-09 NOTE — PROGRESS NOTES
Progress Note - Bradd Bloodgood 1945, 66 y o  male MRN: [id number]    Unit/Bed#: [facility] 603-01 Encounter: [id number]    Primary Care Provider: Mercedes Ralph MD   Date and time admitted to hospital: 8/28/2020 12:43 PM        IVH (intraventricular hemorrhage) ([facility] [address] )  Assessment & Plan  - Please see outlined management under TBI diagnosis  Subdural hematoma (HCC)  Assessment & Plan  - Please see outlined management under TBI diagnosis  [facility] ([facility] )  Assessment & Plan  - Please see outlined management under TBI diagnosis  Type 2 diabetes mellitus, without long-term current use of insulin [facility])  Assessment & Plan    Lab Results   Component Value Date    HGBA1C 5 7 (H) 08/29/2020     - Documented history of type 2 diabetes mellitus with questionable use of metformin per only available medication list from February 2018  - Currently without hyperglycemia on initial lab workup and most recent hemoglobin A1c from March 2020 was 5 7  - Not on ISS, continue to monitor via routine labwork    Pressure injury of back, stage 1  Assessment & Plan  - Suspected stage I pressure injury of the back  - Inpatient Wound Care service evaluation and recommendations appreciated  - Continue with local wound care to multiple wound sites  Fall  Assessment & Plan  - Suspected unwitnessed fall with unknown loss of consciousness and the below noted injuries  - Fall precautions  - Geriatric Medicine evaluation and recommendations appreciated  - PT and OT evaluation and treatment  - Case Management consultation for disposition planning/expected post acute care facility need  Awaiting placement  Abrasions of multiple sites  Assessment & Plan  - Abrasions to multiple sites including all 4 extremities and his back  - Frequent repositioning, every 2 hours  - Inpatient Wound Care team following  Appreciate their recommendations  - Continue local wound care      Encephalopathy acute  Assessment & Plan  - Acute encephalopathy on admission, likely multifactorial in setting of traumatic brain injury, multiple wounds with rhabdomyolysis and acute kidney injury, sepsis  Now significantly improved  - Blood cultures x2 with 1 of the tubes growing coagulase-negative Staphylococcus and Pantoea agglomerans  Patient was started on Ancef based on sensitivities  - Per ID, initial cultures likely a contaminant  Repeat cultures x2 negative at 72hrs, patient received 7 days abx, discontinued 9/4/2020   - Inpatient wound care evaluation and recommendations appreciated  - Delirium precautions  - Now appears to be at baseline mental status  Dementia [facility])  Assessment & Plan  - Chronic/baseline history of dementia  - Delirium precautions  - Geriatric Medicine evaluation and recommendations appreciated  - Continue current medication regimen   - Outpatient follow-up with PCP  * TBI (traumatic brain injury) ([facility] [address] )  Assessment & Plan  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 as well as suspected trace subarachnoid hemorrhage in the left parietal region on initial CT scan  On repeat CT scan of head on 8/28/2020 following transferred to [facility], there appeared to be slight increase of the bilateral intraventricular hemorrhage, blossoming of the left parietal subarachnoid hemorrhage, bilateral subdural hematomas and suspected bleeding near the brainstem  - Repeat CT head on 8/30/2020 demonstrated improvement and/or stable hemorrhages  - Appreciate Neurosurgery evaluation and recommendations  No operative intervention recommended  - Repeat CTH 9/4: Interval improvement in the previously seen intracranial hemorrhage  No significant mass effect or midline shift  No new intracranial hemorrhage is seen    - Continue Keppra to complete a 7 day course, ended 9/4/2020   - Patient cleared for chemical DVT prophylaxis with subcutaneous heparin, all other anti-platelet and anticoagulant medications to be held until cleared by Neurosurgery  - Continue to monitor neurologic exam   - Continue PT and OT evaluation and treatment as indicated  - Outpatient follow-up with Neurosurgery in 1-2wks        Disposition: inpatient, d/c pending placement      SUBJECTIVE:  Chief Complaint: concerned his house is unlocked    Subjective: Patient awake and alert in bed this AM, orientated x3  Pre-occupied with his house being unlocked  No acute events overnight  OBJECTIVE:     Meds/Allergies     Current Facility-Administered Medications:     acetaminophen (TYLENOL) tablet 650 mg, 650 mg, Oral, Q6H PRN, Chay Rose PA-C    brimonidine (ALPHAGAN P) 0 15 % ophthalmic solution 1 drop, 1 drop, Both Eyes, Q8H [address], Raudela Kenji PA-C, 1 drop at 09/09/20 0620    chlorhexidine (PERIDEX) 0 12 % oral rinse 15 mL, 15 mL, Swish & Spit, Q12H [address], Chay Rose PA-C, 15 mL at 09/08/20 2233    heparin (porcine) subcutaneous injection 5,000 Units, 5,000 Units, Subcutaneous, Q8H [address], Raudela Kenji PA-C, 5,000 Units at 09/09/20 0620    lidocaine (LIDODERM) 5 % patch 1 patch, 1 patch, Topical, Daily, HARPER Monahan-C, 1 patch at 09/08/20 1044    ondansetron (ZOFRAN) injection 4 mg, 4 mg, Intravenous, Q6H PRN, Chay Rose PA-C    travoprost (TRAVATAN-Z) 0 004 % ophthalmic solution 1 drop, 1 drop, Both Eyes, HS, Soledad Mcbride, PA-C, 1 drop at 09/08/20 2230    ziprasidone (GEODON) capsule 60 mg, 60 mg, Oral, Daily, Talattta Kenji, PA-C, 60 mg at 09/08/20 1045    ziprasidone (GEODON) capsule 80 mg, 80 mg, Oral, QPM, Willetta Kenji, PA-C, 80 mg at 09/08/20 1909     Vitals:   Vitals:    09/09/20 0715   BP: 123/69   Pulse: 86   Resp:    Temp:    SpO2: 98%       Intake/Output:  I/O       09/07 0701 - 09/08 0700 09/08 0701 - 09/09 0700    P  O  920 890    Total Intake(mL/kg) 920 (13 2) 890 (12 8)    Urine (mL/kg/hr) 1400 (0 8) 475 (0 3) Stool  0    Total Output 1400 475    Net -480 +415          Unmeasured Urine Occurrence 8 x 1 x    Unmeasured Stool Occurrence  1 x           Nutrition/GI Proph/Bowel Reg: regular house    Physical Exam:   Physical Exam  Vitals signs reviewed  Constitutional:       General: He is not in acute distress  Appearance: He is normal weight  He is not toxic-appearing  HENT:      Head: Normocephalic and atraumatic  Nose: No congestion or rhinorrhea  Mouth/Throat:      Mouth: Mucous membranes are moist    Eyes:      General:         Right eye: No discharge  Left eye: No discharge  Neck:      Musculoskeletal: Normal range of motion  No neck rigidity  Cardiovascular:      Rate and Rhythm: Normal rate and regular rhythm  Pulses: Normal pulses  Pulmonary:      Effort: Pulmonary effort is normal  No respiratory distress  Breath sounds: Normal breath sounds  No stridor  No wheezing, rhonchi or rales  Abdominal:      General: There is no distension  Palpations: Abdomen is soft  Tenderness: There is no abdominal tenderness  There is no guarding or rebound  Musculoskeletal:         General: No swelling, deformity or signs of injury  Right lower leg: No edema  Left lower leg: No edema  Skin:     General: Skin is warm  Coloration: Skin is not jaundiced or pale  Neurological:      General: No focal deficit present  Mental Status: He is alert and oriented to person, place, and time  Mental status is at baseline             Invasive Devices     Peripheral Intravenous Line            Peripheral IV 09/05/20 Distal;Left;Ventral (anterior) Forearm 3 days          Drain            External Urinary Catheter Small 3 days                 Lab Results: BMP/CMP: No results found for: SODIUM, K, CL, CO2, ANIONGAP, BUN, CREATININE, GLUCOSE, CALCIUM, AST, ALT, ALKPHOS, PROT, BILITOT, EGFR and CBC: No results found for: WBC, HGB, HCT, MCV, PLT, ADJUSTEDWBC, MCH, MCHC, RDW, MPV, NRBC  Imaging/EKG Studies: Results: I have personally reviewed pertinent reports      Other Studies: none  VTE Prophylaxis: Heparin

## 2020-09-09 NOTE — SOCIAL WORK
CM left voicemail for Pt's POA is [facility] [phone]  LUL then contacted Lazara Neal and Arcelia Keith ( firm) [phone]   The  will contact LUL when he is free

## 2020-09-10 PROCEDURE — 99232 SBSQ HOSP IP/OBS MODERATE 35: CPT | Performed by: INTERNAL MEDICINE

## 2020-09-10 PROCEDURE — 99232 SBSQ HOSP IP/OBS MODERATE 35: CPT | Performed by: SURGERY

## 2020-09-10 PROCEDURE — 97535 SELF CARE MNGMENT TRAINING: CPT

## 2020-09-10 RX ORDER — LANOLIN ALCOHOL/MO/W.PET/CERES
6 CREAM (GRAM) TOPICAL
Status: DISCONTINUED | OUTPATIENT
Start: 2020-09-10 | End: 2020-09-15 | Stop reason: HOSPADM

## 2020-09-10 RX ADMIN — HEPARIN SODIUM 5000 UNITS: 5000 INJECTION INTRAVENOUS; SUBCUTANEOUS at 22:08

## 2020-09-10 RX ADMIN — CHLORHEXIDINE GLUCONATE 0.12% ORAL RINSE 15 ML: 1.2 LIQUID ORAL at 22:07

## 2020-09-10 RX ADMIN — CHLORHEXIDINE GLUCONATE 0.12% ORAL RINSE 15 ML: 1.2 LIQUID ORAL at 08:26

## 2020-09-10 RX ADMIN — ZIPRASIDONE HYDROCHLORIDE 60 MG: 20 CAPSULE ORAL at 08:27

## 2020-09-10 RX ADMIN — BRIMONIDINE TARTRATE 1 DROP: 1.5 SOLUTION OPHTHALMIC at 14:37

## 2020-09-10 RX ADMIN — ZIPRASIDONE HYDROCHLORIDE 80 MG: 40 CAPSULE ORAL at 17:12

## 2020-09-10 RX ADMIN — BRIMONIDINE TARTRATE 1 DROP: 1.5 SOLUTION OPHTHALMIC at 22:08

## 2020-09-10 RX ADMIN — ACETAMINOPHEN 650 MG: 325 TABLET, FILM COATED ORAL at 12:02

## 2020-09-10 RX ADMIN — TRAVOPROST 1 DROP: 0.04 SOLUTION/ DROPS OPHTHALMIC at 22:08

## 2020-09-10 RX ADMIN — BRIMONIDINE TARTRATE 1 DROP: 1.5 SOLUTION OPHTHALMIC at 05:36

## 2020-09-10 RX ADMIN — HEPARIN SODIUM 5000 UNITS: 5000 INJECTION INTRAVENOUS; SUBCUTANEOUS at 14:37

## 2020-09-10 RX ADMIN — MELATONIN 6 MG: at 22:07

## 2020-09-10 RX ADMIN — HEPARIN SODIUM 5000 UNITS: 5000 INJECTION INTRAVENOUS; SUBCUTANEOUS at 05:36

## 2020-09-10 RX ADMIN — LIDOCAINE 5% 1 PATCH: 700 PATCH TOPICAL at 08:28

## 2020-09-10 NOTE — ASSESSMENT & PLAN NOTE
- Acute encephalopathy on admission, likely multifactorial in setting of traumatic brain injury, multiple wounds with rhabdomyolysis and acute kidney injury, sepsis  Now significantly improved  - Blood cultures x2 with 1 of the tubes growing coagulase-negative Staphylococcus and Pantoea agglomerans  Patient was started on Ancef based on sensitivities  - Per ID, initial cultures likely a contaminant  Repeat cultures x2 negative at 5 days, patient received 7 days abx, discontinued 9/4/2020   - Inpatient wound care evaluation and recommendations appreciated  - Delirium precautions  - Now appears to be at baseline mental status

## 2020-09-10 NOTE — PLAN OF CARE
Problem: Potential for Falls  Goal: Patient will remain free of falls  Description: INTERVENTIONS:  - Assess patient frequently for physical needs  -  Identify cognitive and physical deficits and behaviors that affect risk of falls    -  Rochelle fall precautions as indicated by assessment   - Educate patient/family on patient safety including physical limitations  - Instruct patient to call for assistance with activity based on assessment  - Modify environment to reduce risk of injury  - Consider OT/PT consult to assist with strengthening/mobility  Outcome: Progressing     Problem: PAIN - ADULT  Goal: Verbalizes/displays adequate comfort level or baseline comfort level  Description: Interventions:  - Encourage patient to monitor pain and request assistance  - Assess pain using appropriate pain scale  - Administer analgesics based on type and severity of pain and evaluate response  - Implement non-pharmacological measures as appropriate and evaluate response  - Consider cultural and social influences on pain and pain management  - Notify physician/advanced practitioner if interventions unsuccessful or patient reports new pain  Outcome: Progressing     Problem: INFECTION - ADULT  Goal: Absence or prevention of progression during hospitalization  Description: INTERVENTIONS:  - Assess and monitor for signs and symptoms of infection  - Monitor lab/diagnostic results  - Monitor all insertion sites, i e  indwelling lines, tubes, and drains  - Monitor endotracheal if appropriate and nasal secretions for changes in amount and color  - Rochelle appropriate cooling/warming therapies per order  - Administer medications as ordered  - Instruct and encourage patient and family to use good hand hygiene technique  - Identify and instruct in appropriate isolation precautions for identified infection/condition  Outcome: Progressing     Problem: SAFETY ADULT  Goal: Patient will remain free of falls  Description: INTERVENTIONS:  - Assess patient frequently for physical needs  -  Identify cognitive and physical deficits and behaviors that affect risk of falls    -  Rochelle fall precautions as indicated by assessment   - Educate patient/family on patient safety including physical limitations  - Instruct patient to call for assistance with activity based on assessment  - Modify environment to reduce risk of injury  - Consider OT/PT consult to assist with strengthening/mobility  Outcome: Progressing  Goal: Maintain or return to baseline ADL function  Description: INTERVENTIONS:  -  Assess patient's ability to carry out ADLs; assess patient's baseline for ADL function and identify physical deficits which impact ability to perform ADLs (bathing, care of mouth/teeth, toileting, grooming, dressing, etc )  - Assess/evaluate cause of self-care deficits   - Assess range of motion  - Assess patient's mobility; develop plan if impaired  - Assess patient's need for assistive devices and provide as appropriate  - Encourage maximum independence but intervene and supervise when necessary  - Involve family in performance of ADLs  - Assess for home care needs following discharge   - Consider OT consult to assist with ADL evaluation and planning for discharge  - Provide patient education as appropriate  Outcome: Progressing  Goal: Maintain or return mobility status to optimal level  Description: INTERVENTIONS:  - Assess patient's baseline mobility status (ambulation, transfers, stairs, etc )    - Identify cognitive and physical deficits and behaviors that affect mobility  - Identify mobility aids required to assist with transfers and/or ambulation (gait belt, sit-to-stand, lift, walker, cane, etc )  - Rochelle fall precautions as indicated by assessment  - Record patient progress and toleration of activity level on Mobility SBAR; progress patient to next Phase/Stage  - Instruct patient to call for assistance with activity based on assessment  - Consider rehabilitation consult to assist with strengthening/weightbearing, etc   Outcome: Progressing     Problem: DISCHARGE PLANNING  Goal: Discharge to home or other facility with appropriate resources  Description: INTERVENTIONS:  - Identify barriers to discharge w/patient and caregiver  - Arrange for needed discharge resources and transportation as appropriate  - Identify discharge learning needs (meds, wound care, etc )  - Arrange for interpretive services to assist at discharge as needed  - Refer to Case Management Department for coordinating discharge planning if the patient needs post-hospital services based on physician/advanced practitioner order or complex needs related to functional status, cognitive ability, or social support system  Outcome: Progressing     Problem: Knowledge Deficit  Goal: Patient/family/caregiver demonstrates understanding of disease process, treatment plan, medications, and discharge instructions  Description: Complete learning assessment and assess knowledge base    Interventions:  - Provide teaching at level of understanding  - Provide teaching via preferred learning methods  Outcome: Progressing     Problem: NEUROSENSORY - ADULT  Goal: Achieves stable or improved neurological status  Description: INTERVENTIONS  - Monitor and report changes in neurological status  - Monitor vital signs such as temperature, blood pressure, glucose, and any other labs ordered   - Initiate measures to prevent increased intracranial pressure  - Monitor for seizure activity and implement precautions if appropriate      Outcome: Progressing  Goal: Remains free of injury related to seizures activity  Description: INTERVENTIONS  - Maintain airway, patient safety  and administer oxygen as ordered  - Monitor patient for seizure activity, document and report duration and description of seizure to physician/advanced practitioner  - If seizure occurs,  ensure patient safety during seizure  - Reorient patient post seizure  - Seizure pads on all 4 side rails  - Instruct patient/family to notify RN of any seizure activity including if an aura is experienced  - Instruct patient/family to call for assistance with activity based on nursing assessment  - Administer anti-seizure medications if ordered    Outcome: Progressing  Goal: Achieves maximal functionality and self care  Description: INTERVENTIONS  - Monitor swallowing and airway patency with patient fatigue and changes in neurological status  - Encourage and assist patient to increase activity and self care     - Encourage visually impaired, hearing impaired and aphasic patients to use assistive/communication devices  Outcome: Progressing     Problem: SKIN/TISSUE INTEGRITY - ADULT  Goal: Skin integrity remains intact  Description: INTERVENTIONS  - Identify patients at risk for skin breakdown  - Assess and monitor skin integrity  - Assess and monitor nutrition and hydration status  - Monitor labs (i e  albumin)  - Assess for incontinence   - Turn and reposition patient  - Assist with mobility/ambulation  - Relieve pressure over bony prominences  - Avoid friction and shearing  - Provide appropriate hygiene as needed including keeping skin clean and dry  - Evaluate need for skin moisturizer/barrier cream  - Collaborate with interdisciplinary team (i e  Nutrition, Rehabilitation, etc )   - Patient/family teaching  Outcome: Progressing  Goal: Incision(s), wounds(s) or drain site(s) healing without S/S of infection  Description: INTERVENTIONS  - Assess and document risk factors for skin impairment   - Assess and document dressing, incision, wound bed, drain sites and surrounding tissue  - Consider nutrition services referral as needed  - Oral mucous membranes remain intact  - Provide patient/ family education  Outcome: Progressing  Goal: Oral mucous membranes remain intact  Description: INTERVENTIONS  - Assess oral mucosa and hygiene practices  - Implement preventative oral hygiene regimen  - Implement oral medicated treatments as ordered  - Initiate Nutrition services referral as needed  Outcome: Progressing     Problem: HEMATOLOGIC - ADULT  Goal: Maintains hematologic stability  Description: INTERVENTIONS  - Assess for signs and symptoms of bleeding or hemorrhage  - Monitor labs  - Administer supportive blood products/factors as ordered and appropriate  Outcome: Progressing     Problem: Prexisting or High Potential for Compromised Skin Integrity  Goal: Skin integrity is maintained or improved  Description: INTERVENTIONS:  - Identify patients at risk for skin breakdown  - Assess and monitor skin integrity  - Assess and monitor nutrition and hydration status  - Monitor labs   - Assess for incontinence   - Turn and reposition patient  - Assist with mobility/ambulation  - Relieve pressure over bony prominences  - Avoid friction and shearing  - Provide appropriate hygiene as needed including keeping skin clean and dry  - Evaluate need for skin moisturizer/barrier cream  - Collaborate with interdisciplinary team   - Patient/family teaching  - Consider wound care consult   Outcome: Progressing     Problem: SAFETY,RESTRAINT: NV/NON-SELF DESTRUCTIVE BEHAVIOR  Goal: Remains free of harm/injury (restraint for non violent/non self-detsructive behavior)  Description: INTERVENTIONS:  - Instruct patient/family regarding restraint use   - Assess and monitor physiologic and psychological status   - Provide interventions and comfort measures to meet assessed patient needs   - Identify and implement measures to help patient regain control  - Assess readiness for release of restraint   Outcome: Progressing  Goal: Returns to optimal restraint-free functioning  Description: INTERVENTIONS:  - Assess the patient's behavior and symptoms that indicate continued need for restraint  - Identify and implement measures to help patient regain control  - Assess readiness for release of restraint   Outcome: Progressing Problem: COPING  Goal: Pt/Family able to verbalize concerns and demonstrate effective coping strategies  Description: INTERVENTIONS:  - Assist patient/family to identify coping skills, available support systems and cultural and spiritual values  - Provide emotional support, including active listening and acknowledgement of concerns of patient and caregivers  - Reduce environmental stimuli, as able  - Provide patient education  - Assess for spiritual pain/suffering and initiate spiritual care, including notification of Pastoral Care or afshan based community as needed  - Assess effectiveness of coping strategies  Outcome: Progressing     Problem: DECISION MAKING  Goal: Pt/Family able to effectively weigh alternatives and participate in decision making related to treatment and care  Description: INTERVENTIONS:  - Identify decision maker  - Determine when there are differences among patient's view, family's view, and healthcare provider's view of patient condition and care goals  - Facilitate patient/family articulation of goals for care  - Help patient/family identify pros/cons of alternative solutions  - Provide information as requested by patient/family  - Respect patient/family rights related to privacy and sharing information   - Serve as a liaison between patient, family and health care team  - Initiate consults as appropriate (Ethics Team, Palliative Care, Family Care Conference, etc )  Outcome: Progressing     Problem: Nutrition/Hydration-ADULT  Goal: Nutrient/Hydration intake appropriate for improving, restoring or maintaining nutritional needs  Description: Monitor and assess patient's nutrition/hydration status for malnutrition  Collaborate with interdisciplinary team and initiate plan and interventions as ordered  Monitor patient's weight and dietary intake as ordered or per policy  Utilize nutrition screening tool and intervene as necessary   Determine patient's food preferences and provide high-protein, high-caloric foods as appropriate       INTERVENTIONS:  - Monitor oral intake, urinary output, labs, and treatment plans  - Assess nutrition and hydration status and recommend course of action  - Evaluate amount of meals eaten  - Assist patient with eating if necessary   - Allow adequate time for meals  - Recommend/ encourage appropriate diets, oral nutritional supplements, and vitamin/mineral supplements  - Order, calculate, and assess calorie counts as needed  - Recommend, monitor, and adjust tube feedings and TPN/PPN based on assessed needs  - Assess need for intravenous fluids  - Provide specific nutrition/hydration education as appropriate  - Include patient/family/caregiver in decisions related to nutrition  Outcome: Progressing

## 2020-09-10 NOTE — ASSESSMENT & PLAN NOTE
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 as well as suspected trace subarachnoid hemorrhage in the left parietal region on initial CT scan  On repeat CT scan of head on 8/28/2020 following transferred to [facility], there appeared to be slight increase of the bilateral intraventricular hemorrhage, blossoming of the left parietal subarachnoid hemorrhage, bilateral subdural hematomas and suspected bleeding near the brainstem  - Repeat CT head on 8/30/2020 demonstrated improvement and/or stable hemorrhages  - Appreciate Neurosurgery evaluation and recommendations  No operative intervention recommended  - Repeat CTH 9/4: Interval improvement in the previously seen intracranial hemorrhage  No significant mass effect or midline shift  No new intracranial hemorrhage is seen  - Continue Keppra to complete a 7 day course, ended 9/4/2020   - Patient cleared for chemical DVT prophylaxis with subcutaneous heparin, all other anti-platelet and anticoagulant medications to be held until cleared by Neurosurgery  - Continue to monitor neurologic exam   - Continue PT and OT evaluation and treatment as indicated    - Outpatient follow-up with Neurosurgery in 1-2wks

## 2020-09-10 NOTE — PROGRESS NOTES
Progress Note - Miriam Butts 1945, 66 y o  male MRN: [id number]    Unit/Bed#: [facility] 603-01 Encounter: [id number]    Primary Care Provider: Nereida Mixon MD   Date and time admitted to hospital: 8/28/2020 12:43 PM        IVH (intraventricular hemorrhage) ([facility] [address] )  Assessment & Plan  - Please see outlined management under TBI diagnosis  Subdural hematoma (HCC)  Assessment & Plan  - Please see outlined management under TBI diagnosis  [facility] ([facility]  Assessment & Plan  - Please see outlined management under TBI diagnosis  Type 2 diabetes mellitus, without long-term current use of insulin [facility])  Assessment & Plan    Lab Results   Component Value Date    HGBA1C 5 7 (H) 08/29/2020     - Documented history of type 2 diabetes mellitus with questionable use of metformin per only available medication list from February 2018  - Currently without hyperglycemia on initial lab workup and most recent hemoglobin A1c from March 2020 was 5 7  - Not on ISS, continue to monitor via routine labwork    Pressure injury of back, stage 1  Assessment & Plan  - Suspected stage I pressure injury of the back  - Inpatient Wound Care service evaluation and recommendations appreciated  - Continue with local wound care to multiple wound sites  Fall  Assessment & Plan  - Suspected unwitnessed fall with unknown loss of consciousness and the below noted injuries  - Fall precautions  - Geriatric Medicine evaluation and recommendations appreciated  - PT and OT evaluation and treatment  - Case Management consultation for disposition planning/expected post acute care facility need  Awaiting placement  Abrasions of multiple sites  Assessment & Plan  - Abrasions to multiple sites including all 4 extremities and his back  - Frequent repositioning, every 2 hours  - Inpatient Wound Care team following  Appreciate their recommendations  - Continue local wound care      Encephalopathy acute  Assessment & Plan  - Acute encephalopathy on admission, likely multifactorial in setting of traumatic brain injury, multiple wounds with rhabdomyolysis and acute kidney injury, sepsis  Now significantly improved  - Blood cultures x2 with 1 of the tubes growing coagulase-negative Staphylococcus and Pantoea agglomerans  Patient was started on Ancef based on sensitivities  - Per ID, initial cultures likely a contaminant  Repeat cultures x2 negative at 5 days, patient received 7 days abx, discontinued 9/4/2020   - Inpatient wound care evaluation and recommendations appreciated  - Delirium precautions  - Now appears to be at baseline mental status  Dementia [facility])  Assessment & Plan  - Chronic/baseline history of dementia  - Delirium precautions  - Geriatric Medicine evaluation and recommendations appreciated  - Continue current medication regimen   - Outpatient follow-up with PCP  * TBI (traumatic brain injury) ([facility] [address] )  Assessment & Plan  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 as well as suspected trace subarachnoid hemorrhage in the left parietal region on initial CT scan  On repeat CT scan of head on 8/28/2020 following transferred to [facility], there appeared to be slight increase of the bilateral intraventricular hemorrhage, blossoming of the left parietal subarachnoid hemorrhage, bilateral subdural hematomas and suspected bleeding near the brainstem  - Repeat CT head on 8/30/2020 demonstrated improvement and/or stable hemorrhages  - Appreciate Neurosurgery evaluation and recommendations  No operative intervention recommended  - Repeat CTH 9/4: Interval improvement in the previously seen intracranial hemorrhage  No significant mass effect or midline shift  No new intracranial hemorrhage is seen    - Continue Keppra to complete a 7 day course, ended 9/4/2020   - Patient cleared for chemical DVT prophylaxis with subcutaneous heparin, all other anti-platelet and anticoagulant medications to be held until cleared by Neurosurgery  - Continue to monitor neurologic exam   - Continue PT and OT evaluation and treatment as indicated  - Outpatient follow-up with Neurosurgery in 1-2wks          Disposition: Awaiting rehab placement      SUBJECTIVE:  Chief Complaint: SAH, IVH, SDH, ICH    Subjective: NAEO, no new complaints at this time  OBJECTIVE:     Meds/Allergies     Current Facility-Administered Medications:     acetaminophen (TYLENOL) tablet 650 mg, 650 mg, Oral, Q6H PRN, Kimmy Elizabeth PA-C    brimonidine (ALPHAGAN P) 0 15 % ophthalmic solution 1 drop, 1 drop, Both Eyes, Q8H [address], Valeriano Mcmullen PA-C, 1 drop at 09/09/20 2240    chlorhexidine (PERIDEX) 0 12 % oral rinse 15 mL, 15 mL, Swish & Spit, Q12H [address], Kimmy Elizabeth PA-C, 15 mL at 09/09/20 2240    heparin (porcine) subcutaneous injection 5,000 Units, 5,000 Units, Subcutaneous, Q8H [address], Valeriano Mcmullen PA-C, 5,000 Units at 09/09/20 2240    lidocaine (LIDODERM) 5 % patch 1 patch, 1 patch, Topical, Daily, Kimmy Elizabeth PA-C, 1 patch at 09/09/20 0919    ondansetron (ZOFRAN) injection 4 mg, 4 mg, Intravenous, Q6H PRN, Kimmy Elizabeth PA-C    travoprost (TRAVATAN-Z) 0 004 % ophthalmic solution 1 drop, 1 drop, Both Eyes, HS, Soledad Mcbride PA-C, 1 drop at 09/09/20 2240    ziprasidone (GEODON) capsule 60 mg, 60 mg, Oral, Daily, Valeriano Mcmullen PA-C, 60 mg at 09/09/20 0919    ziprasidone (GEODON) capsule 80 mg, 80 mg, Oral, QPM, Valeriano Mcmullen PA-C, 80 mg at 09/09/20 1705     Vitals:   Vitals:    09/09/20 2227   BP: 125/79   Pulse:    Resp: 16   Temp: 98 3 °F (36 8 °C)   SpO2:        Intake/Output:  I/O       09/08 0701 - 09/09 0700 09/09 0701 - 09/10 0700    P  O  990 995    Total Intake(mL/kg) 990 (14 2) 995 (14 2)    Urine (mL/kg/hr) 475 (0 3) 650 (0 4)    Stool 0     Total Output 475 650    Net +515 +345          Unmeasured Urine Occurrence 2 x 5 x Unmeasured Stool Occurrence 1 x            Nutrition/GI Proph/Bowel Reg: Regular diet, no bowel regimen    Physical Exam:   General: AAO x 3, NAD, patient hard of hearing  HEENT: Normocephalic, atraumatic, conjunctiva normal, EOMI, PERRLA  Neck: No JVD, No LAD  Cardio: RRR  Resp: NWB on RA  Abd: Soft, nontender, nondistended, No guarding, no rebound  Neuro: Sensation and motor intact x 4 limbs  Extremities: WWP, No edema  Skin: Warm and dry      Invasive Devices     Drain            External Urinary Catheter Small 4 days                 Lab Results: Results: I have personally reviewed pertinent reports      Imaging/EKG Studies: No new imaging  Other Studies: None  VTE Prophylaxis: Heparin

## 2020-09-10 NOTE — PLAN OF CARE
Problem: OCCUPATIONAL THERAPY ADULT  Goal: Performs self-care activities at highest level of function for planned discharge setting  See evaluation for individualized goals  Description:            See flowsheet documentation for full assessment, interventions and recommendations  Note: Limitation: Decreased ADL status, Decreased Safe judgement during ADL, Decreased endurance, Decreased self-care trans, Decreased high-level ADLs  Prognosis: Good, Fair  Assessment: Pt participated in am treatment session focusing on ADL retraining, functional transfer training and functional mobility retraining  Upon arrival pt was seated in recliner chair  Pt agreable to treatment session  Pt stated he was feeling very anxious, repeating "I'm very anxious, restless"  ADL session completed seated at sink  Pt completes UB bathing and grooming tasks c S, toileting and UB dressing c min A and LB bathing / dressing c max A  Pt completes functional transfers and functional mobility to / from bathroom c min A c use of RW  Pt noted to be very [place] requiring directions to be repeated multiple times / handwritten  Pt was unable to attend to tasks and required redirection and moderate VCs to complete tasks at hand  Pt provided crossword puzzles to complete for leisure interests  Pt still limited by factors stated above  Pt will continue to benefit from skilled OT services to maximize independence in ADLs/IADLs, functional transfers and functional mobility  Recommendation: (S) Geriatric Consult  OT Discharge Recommendation: Post-Acute Rehabilitation Services  OT - OK to Discharge:  Yes     NEWTON Whitehead

## 2020-09-10 NOTE — SOCIAL WORK
LUL left voicemail for Pt's POA is Hiwot Anahi [phone]    Lul was also told by the law firm, that one of the  would contact LUL regarding this case and the difficulties in finding yogesh seals

## 2020-09-10 NOTE — OCCUPATIONAL THERAPY NOTE
Occupational Therapy Treatment Note:         09/10/20 0935   Restrictions/Precautions   Other Precautions Impulsive;Cognitive; Chair Alarm; Bed Alarm; Fall Risk;Hard of hearing   Pain Assessment   Pain Assessment Tool Pain Assessment not indicated - pt denies pain   Pain Score No Pain   ADL   Where Assessed Sitting at sink   Grooming Assistance 5  Supervision/Setup   Grooming Deficit Setup;Verbal cueing;Supervision/safety; Increased time to complete   UB Bathing Assistance 5  Supervision/Setup   UB Bathing Deficit Setup;Verbal cueing;Supervision/safety; Increased time to complete   LB Bathing Assistance 2  Maximal Assistance   LB Bathing Deficit Setup;Verbal cueing;Supervision/safety; Increased time to complete; Buttocks;Right lower leg including foot; Left lower leg including foot   LB Bathing Comments Pt was very difficult to redirect during LB bathing tasks  He was unable to attend to task  UB Dressing Assistance 4  Minimal Assistance   UB Dressing Deficit Setup;Verbal cueing;Supervision/safety; Increased time to complete;Pull around back   LB Dressing Assistance 2  Maximal Assistance   LB Dressing Deficit Setup;Verbal cueing;Supervision/safety; Increased time to complete; Don/doff R sock; Don/doff L sock   LB Dressing Comments Pt was very difficult to redirect during LB dressing tasks  He was unable to attend to tasks  Toileting Assistance  4  Minimal Assistance   Toileting Deficit Setup; Impulsive;Verbal cueing;Supervison/safety; Increased time to complete;Grab bar use   Functional Standing Tolerance   Time Fair - balance noted this treatment session  Bed Mobility   Additional Comments Upon arrival and exit, pt was resting in recliner chair c all necessities in reach and chair alarm turned on  Transfers   Sit to Stand 4  Minimal assistance   Additional items Assist x 1; Increased time required; Impulsive;Verbal cues   Stand to Sit 4  Minimal assistance   Additional items Assist x 1; Increased time required; Impulsive;Verbal cues   Toilet transfer 4  Minimal assistance   Additional items Assist x 1; Increased time required; Impulsive;Verbal cues;Standard toilet   Functional Mobility   Functional Mobility 4  Minimal assistance   Additional Comments Pt required min A for functional mobility tasks for safety / steadying  Pt required min A to safely steer RW  Additional items Rolling walker   Cognition   Arousal/Participation Alert; Responsive   Attention Attends with cues to redirect   Orientation Level Oriented X4   Memory Decreased short term memory;Decreased recall of precautions   Following Commands Follows one step commands with increased time or repetition   Activity Tolerance   Activity Tolerance Patient tolerated treatment well   Assessment   Assessment Pt participated in am treatment session focusing on ADL retraining, functional transfer training and functional mobility retraining  Upon arrival pt was seated in recliner chair  Pt agreable to treatment session  Pt stated he was feeling very anxious, repeating "I'm very anxious, restless"  ADL session completed seated at sink  Pt completes UB bathing and grooming tasks c S, toileting and UB dressing c min A and LB bathing / dressing c max A  Pt completes functional transfers and functional mobility to / from bathroom c min A c use of RW  Pt noted to be very [place] requiring directions to be repeated multiple times / handwritten  Pt was unable to attend to tasks and required redirection and moderate VCs to complete tasks at hand  Pt provided crossword puzzles to complete for leisure interests  Pt still limited by factors stated above  Pt will continue to benefit from skilled OT services to maximize independence in ADLs/IADLs, functional transfers and functional mobility      Plan   Goal Expiration Date 09/14/20   OT Treatment Day 2   OT Frequency 3-5x/wk   Recommendation   OT Discharge Recommendation Post-Acute Rehabilitation Services   OT - OK to Discharge Yes Elena Mcgee

## 2020-09-10 NOTE — PROGRESS NOTES
Progress Note - Geriatric Medicine   Peggy Waldron  66 y o  male MRN: [id number]  Unit/Bed#: [facility] 603-01 Encounter: [id number]      Assessment/Plan:    Traumatic brain injury  -patient with subarachnoid and subdural hemorrhages on admission suspected to be secondary to fall as patient was found down outside of home with multiple abrasions and injuries consistent with fall  -patient remains impulsive extremely hard of hearing, continue to provide redirection and reorientation as appropriate, continue use of dry erase board for communication which has been best way to communicate with patient  -continue to use me to understand and direct statements when explaining things to patient especially regarding impulsivity issues as he continually attempting to get out of bed without assistance but when reminded that everyone must call for assistance to ambulate while in the hospital because it is the rule he is able to follow direction more easily    Cognitive impairment  -highly suspicious for at least cognitive impairment versus dementia, recommend close outpatient follow-up and comprehensive geriatric assessment  -continue to encourage frequent reorientation and redirection as appropriate and indicated  -patient awaiting placement as patient is unable to safely reside home alone this time     Suspected underlying bipolar disorder versus other chronic psychiatric illness  -patient's symptoms appear to be well controlled on home regimen Geodon which was continued on admission  -will continue to require extremely close outpatient follow-up with PCP and Psychiatry for chronic disease management and medication titration and monitoring    Ambulatory dysfunction  -requires use of walker ambulation at baseline, encourage use of appropriate x2  -continue to encourage patient to call for assistance with ambulation  -patient impulsive at times and attempting to get up without use of walker but if reiterate at the start of each shift that it is the rule for people to call for assistance to get up well earlier in the hospital patient is much more willing to do so  -was able to clarify with patient yesterday that his frustrations were due to thinking we thought he who COULD NOT get up and ambulate with a walker and explained that we understand and appreciate that he is able but that it is the rule that in the hospital we ask pts who are fall risk not to ambulate without assistance because we want to prevent falls and that this rule applies to everyone which he voiced understanding to    Impaired hearing  -patient reports that he typically uses hearing aids which are not with him during admission, did not feel hearing amplifier was helpful  -continue to encourage staff to use dry erase board or write on paper to communicate with patient, strongly encourage staff not to attempt speaking louder to communicate with patient as he repeatedly voices frustration that people continue to do this and it causes him great anxiety which then makes it even more difficult to understand and follow the directions given    Subdural/subarachnoid hemorrhage  -suspect secondary to fall prior to admission given presenting injuries and skin abrasions as well as being found down in front of home and confused unable to provide further details about incident  -has completed 7 day course of Keppra  -Neurosurgery following, will need outpatient follow-up with repeat imaging prior to appointment    Delirium prevention  -continue to ensure that pain is well controlled, anxiety addressed, encourage staff members not to speak loudly to overcome hearing barrier as this increases patient's anxiety and risk of delirium  -maintain normal circadian rhythm  -encourage good nutritional support  -continue good control chronic medical conditions  -monitor for fecal and urinary retention    Subjective:     Patient seen examined at bedside where he is lying resting, he reports he slept well overnight and has only mild aching pain in his legs this morning which is chronic and unchanged from baseline  He tells me he is anxious today and is worried that his house is not locked but offers no additional complaints and is easily reassured  Review of Systems   Constitutional: Negative for appetite change, chills and fever  HENT: Positive for dental problem  Negative for trouble swallowing  Eyes: Positive for visual disturbance (Wears glasses)  Respiratory: Negative for cough, chest tightness, shortness of breath and wheezing  Cardiovascular: Negative for chest pain and palpitations  Gastrointestinal: Negative for abdominal pain, nausea and vomiting  Endocrine: Negative  Genitourinary: Negative  Musculoskeletal: Positive for arthralgias (legs are aching today) and gait problem  Skin: Negative  Allergic/Immunologic: Negative  Neurological: Negative for dizziness, weakness, light-headedness and headaches  Hematological: Negative  Psychiatric/Behavioral: Negative for agitation and sleep disturbance  The patient is nervous/anxious  All other systems reviewed and are negative  Objective:     Vitals: Blood pressure 125/79, pulse 89, temperature 98 3 °F (36 8 °C), temperature source Oral, resp  rate 16, height 5' 7" (1 702 m), weight 69 9 kg (154 lb), SpO2 98 %  ,Body mass index is 24 12 kg/m²  Intake/Output Summary (Last 24 hours) at 9/10/2020 0658  Last data filed at 9/10/2020 0600  Gross per 24 hour   Intake 1215 ml   Output 775 ml   Net 440 ml     Current Medications: Reviewed    Physical Exam:   Physical Exam  Vitals signs and nursing note reviewed  Constitutional:       Appearance: Normal appearance  Comments: No acute distress lying in bed resting comfortably   HENT:      Head: Normocephalic and atraumatic  Mouth/Throat:      Mouth: Mucous membranes are dry     Eyes:      Conjunctiva/sclera: Conjunctivae normal       Comments: Dysconjugate gaze, chronic  Not wearing corrective lenses at time of evaluation   Neck:      Musculoskeletal: Neck supple  Comments: Trachea midline  Cardiovascular:      Rate and Rhythm: Normal rate  Pulses: Normal pulses  Pulmonary:      Effort: Pulmonary effort is normal       Breath sounds: Normal breath sounds  No wheezing  Abdominal:      General: Bowel sounds are normal  There is no distension  Palpations: Abdomen is soft  Tenderness: There is no abdominal tenderness  Musculoskeletal:         General: No tenderness  Right lower leg: No edema  Left lower leg: No edema  Comments: Normal overall muscle mass   Skin:     General: Skin is warm and dry  Neurological:      Mental Status: He is alert  Mental status is at baseline  Comments: Patient is awake and alert, oriented, and cooperative but is very repetitive and fixated asking if his house is locked   Psychiatric:      Comments: Patient appears much less restless than previous encounters        Invasive Devices     Drain            External Urinary Catheter Small 4 days              Lab, Imaging and other studies: I have personally reviewed pertinent reports

## 2020-09-11 PROCEDURE — 99232 SBSQ HOSP IP/OBS MODERATE 35: CPT | Performed by: SURGERY

## 2020-09-11 RX ORDER — GINSENG 100 MG
1 CAPSULE ORAL 2 TIMES DAILY
Status: DISCONTINUED | OUTPATIENT
Start: 2020-09-11 | End: 2020-09-15 | Stop reason: HOSPADM

## 2020-09-11 RX ADMIN — CHLORHEXIDINE GLUCONATE 0.12% ORAL RINSE 15 ML: 1.2 LIQUID ORAL at 22:32

## 2020-09-11 RX ADMIN — BRIMONIDINE TARTRATE 1 DROP: 1.5 SOLUTION OPHTHALMIC at 05:58

## 2020-09-11 RX ADMIN — ZIPRASIDONE HYDROCHLORIDE 60 MG: 20 CAPSULE ORAL at 09:16

## 2020-09-11 RX ADMIN — ZIPRASIDONE HYDROCHLORIDE 80 MG: 40 CAPSULE ORAL at 17:06

## 2020-09-11 RX ADMIN — BRIMONIDINE TARTRATE 1 DROP: 1.5 SOLUTION OPHTHALMIC at 22:33

## 2020-09-11 RX ADMIN — CHLORHEXIDINE GLUCONATE 0.12% ORAL RINSE 15 ML: 1.2 LIQUID ORAL at 09:16

## 2020-09-11 RX ADMIN — LIDOCAINE 5% 1 PATCH: 700 PATCH TOPICAL at 09:16

## 2020-09-11 RX ADMIN — BACITRACIN ZINC 1 LARGE APPLICATION: 500 OINTMENT TOPICAL at 17:06

## 2020-09-11 RX ADMIN — MELATONIN 6 MG: at 22:33

## 2020-09-11 RX ADMIN — BRIMONIDINE TARTRATE 1 DROP: 1.5 SOLUTION OPHTHALMIC at 15:41

## 2020-09-11 RX ADMIN — HEPARIN SODIUM 5000 UNITS: 5000 INJECTION INTRAVENOUS; SUBCUTANEOUS at 05:58

## 2020-09-11 RX ADMIN — HEPARIN SODIUM 5000 UNITS: 5000 INJECTION INTRAVENOUS; SUBCUTANEOUS at 22:33

## 2020-09-11 RX ADMIN — HEPARIN SODIUM 5000 UNITS: 5000 INJECTION INTRAVENOUS; SUBCUTANEOUS at 15:40

## 2020-09-11 RX ADMIN — TRAVOPROST 1 DROP: 0.04 SOLUTION/ DROPS OPHTHALMIC at 22:33

## 2020-09-11 NOTE — PLAN OF CARE
Problem: Potential for Falls  Goal: Patient will remain free of falls  Description: INTERVENTIONS:  - Assess patient frequently for physical needs  -  Identify cognitive and physical deficits and behaviors that affect risk of falls    -  [place] fall precautions as indicated by assessment   - Educate patient/family on patient safety including physical limitations  - Instruct patient to call for assistance with activity based on assessment  - Modify environment to reduce risk of injury  - Consider OT/PT consult to assist with strengthening/mobility  Outcome: Progressing     Problem: PAIN - ADULT  Goal: Verbalizes/displays adequate comfort level or baseline comfort level  Description: Interventions:  - Encourage patient to monitor pain and request assistance  - Assess pain using appropriate pain scale  - Administer analgesics based on type and severity of pain and evaluate response  - Implement non-pharmacological measures as appropriate and evaluate response  - Consider cultural and social influences on pain and pain management  - Notify physician/advanced practitioner if interventions unsuccessful or patient reports new pain  Outcome: Progressing     Problem: INFECTION - ADULT  Goal: Absence or prevention of progression during hospitalization  Description: INTERVENTIONS:  - Assess and monitor for signs and symptoms of infection  - Monitor lab/diagnostic results  - Monitor all insertion sites, i e  indwelling lines, tubes, and drains  - Monitor endotracheal if appropriate and nasal secretions for changes in amount and color  - [place] appropriate cooling/warming therapies per order  - Administer medications as ordered  - Instruct and encourage patient and family to use good hand hygiene technique  - Identify and instruct in appropriate isolation precautions for identified infection/condition  Outcome: Progressing     Problem: SAFETY ADULT  Goal: Patient will remain free of falls  Description: INTERVENTIONS:  - Assess patient frequently for physical needs  -  Identify cognitive and physical deficits and behaviors that affect risk of falls    -  [place] fall precautions as indicated by assessment   - Educate patient/family on patient safety including physical limitations  - Instruct patient to call for assistance with activity based on assessment  - Modify environment to reduce risk of injury  - Consider OT/PT consult to assist with strengthening/mobility  Outcome: Progressing  Goal: Maintain or return to baseline ADL function  Description: INTERVENTIONS:  -  Assess patient's ability to carry out ADLs; assess patient's baseline for ADL function and identify physical deficits which impact ability to perform ADLs (bathing, care of mouth/teeth, toileting, grooming, dressing, etc )  - Assess/evaluate cause of self-care deficits   - Assess range of motion  - Assess patient's mobility; develop plan if impaired  - Assess patient's need for assistive devices and provide as appropriate  - Encourage maximum independence but intervene and supervise when necessary  - Involve family in performance of ADLs  - Assess for home care needs following discharge   - Consider OT consult to assist with ADL evaluation and planning for discharge  - Provide patient education as appropriate  Outcome: Progressing  Goal: Maintain or return mobility status to optimal level  Description: INTERVENTIONS:  - Assess patient's baseline mobility status (ambulation, transfers, stairs, etc )    - Identify cognitive and physical deficits and behaviors that affect mobility  - Identify mobility aids required to assist with transfers and/or ambulation (gait belt, sit-to-stand, lift, walker, cane, etc )  - [place] fall precautions as indicated by assessment  - Record patient progress and toleration of activity level on Mobility SBAR; progress patient to next Phase/Stage  - Instruct patient to call for assistance with activity based on assessment  - Consider rehabilitation consult to assist with strengthening/weightbearing, etc   Outcome: Progressing     Problem: DISCHARGE PLANNING  Goal: Discharge to home or other facility with appropriate resources  Description: INTERVENTIONS:  - Identify barriers to discharge w/patient and caregiver  - Arrange for needed discharge resources and transportation as appropriate  - Identify discharge learning needs (meds, wound care, etc )  - Arrange for interpretive services to assist at discharge as needed  - Refer to Case Management Department for coordinating discharge planning if the patient needs post-hospital services based on physician/advanced practitioner order or complex needs related to functional status, cognitive ability, or social support system  Outcome: Progressing     Problem: Knowledge Deficit  Goal: Patient/family/caregiver demonstrates understanding of disease process, treatment plan, medications, and discharge instructions  Description: Complete learning assessment and assess knowledge base    Interventions:  - Provide teaching at level of understanding  - Provide teaching via preferred learning methods  Outcome: Progressing     Problem: NEUROSENSORY - ADULT  Goal: Achieves stable or improved neurological status  Description: INTERVENTIONS  - Monitor and report changes in neurological status  - Monitor vital signs such as temperature, blood pressure, glucose, and any other labs ordered   - Initiate measures to prevent increased intracranial pressure  - Monitor for seizure activity and implement precautions if appropriate      Outcome: Progressing  Goal: Remains free of injury related to seizures activity  Description: INTERVENTIONS  - Maintain airway, patient safety  and administer oxygen as ordered  - Monitor patient for seizure activity, document and report duration and description of seizure to physician/advanced practitioner  - If seizure occurs,  ensure patient safety during seizure  - Reorient patient post seizure  - Seizure pads on all 4 side rails  - Instruct patient/family to notify RN of any seizure activity including if an aura is experienced  - Instruct patient/family to call for assistance with activity based on nursing assessment  - Administer anti-seizure medications if ordered    Outcome: Progressing  Goal: Achieves maximal functionality and self care  Description: INTERVENTIONS  - Monitor swallowing and airway patency with patient fatigue and changes in neurological status  - Encourage and assist patient to increase activity and self care     - Encourage visually impaired, hearing impaired and aphasic patients to use assistive/communication devices  Outcome: Progressing     Problem: SKIN/TISSUE INTEGRITY - ADULT  Goal: Skin integrity remains intact  Description: INTERVENTIONS  - Identify patients at risk for skin breakdown  - Assess and monitor skin integrity  - Assess and monitor nutrition and hydration status  - Monitor labs (i e  albumin)  - Assess for incontinence   - Turn and reposition patient  - Assist with mobility/ambulation  - Relieve pressure over bony prominences  - Avoid friction and shearing  - Provide appropriate hygiene as needed including keeping skin clean and dry  - Evaluate need for skin moisturizer/barrier cream  - Collaborate with interdisciplinary team (i e  Nutrition, Rehabilitation, etc )   - Patient/family teaching  Outcome: Progressing  Goal: Incision(s), wounds(s) or drain site(s) healing without S/S of infection  Description: INTERVENTIONS  - Assess and document risk factors for skin impairment   - Assess and document dressing, incision, wound bed, drain sites and surrounding tissue  - Consider nutrition services referral as needed  - Oral mucous membranes remain intact  - Provide patient/ family education  Outcome: Progressing  Goal: Oral mucous membranes remain intact  Description: INTERVENTIONS  - Assess oral mucosa and hygiene practices  - Implement preventative oral hygiene regimen  - Implement oral medicated treatments as ordered  - Initiate Nutrition services referral as needed  Outcome: Progressing     Problem: HEMATOLOGIC - ADULT  Goal: Maintains hematologic stability  Description: INTERVENTIONS  - Assess for signs and symptoms of bleeding or hemorrhage  - Monitor labs  - Administer supportive blood products/factors as ordered and appropriate  Outcome: Progressing     Problem: Prexisting or High Potential for Compromised Skin Integrity  Goal: Skin integrity is maintained or improved  Description: INTERVENTIONS:  - Identify patients at risk for skin breakdown  - Assess and monitor skin integrity  - Assess and monitor nutrition and hydration status  - Monitor labs   - Assess for incontinence   - Turn and reposition patient  - Assist with mobility/ambulation  - Relieve pressure over bony prominences  - Avoid friction and shearing  - Provide appropriate hygiene as needed including keeping skin clean and dry  - Evaluate need for skin moisturizer/barrier cream  - Collaborate with interdisciplinary team   - Patient/family teaching  - Consider wound care consult   Outcome: Progressing     Problem: SAFETY,RESTRAINT: NV/NON-SELF DESTRUCTIVE BEHAVIOR  Goal: Remains free of harm/injury (restraint for non violent/non self-detsructive behavior)  Description: INTERVENTIONS:  - Instruct patient/family regarding restraint use   - Assess and monitor physiologic and psychological status   - Provide interventions and comfort measures to meet assessed patient needs   - Identify and implement measures to help patient regain control  - Assess readiness for release of restraint   Outcome: Progressing  Goal: Returns to optimal restraint-free functioning  Description: INTERVENTIONS:  - Assess the patient's behavior and symptoms that indicate continued need for restraint  - Identify and implement measures to help patient regain control  - Assess readiness for release of restraint   Outcome: Progressing Problem: COPING  Goal: Pt/Family able to verbalize concerns and demonstrate effective coping strategies  Description: INTERVENTIONS:  - Assist patient/family to identify coping skills, available support systems and cultural and spiritual values  - Provide emotional support, including active listening and acknowledgement of concerns of patient and caregivers  - Reduce environmental stimuli, as able  - Provide patient education  - Assess for spiritual pain/suffering and initiate spiritual care, including notification of Pastoral Care or afshan based community as needed  - Assess effectiveness of coping strategies  Outcome: Progressing     Problem: DECISION MAKING  Goal: Pt/Family able to effectively weigh alternatives and participate in decision making related to treatment and care  Description: INTERVENTIONS:  - Identify decision maker  - Determine when there are differences among patient's view, family's view, and healthcare provider's view of patient condition and care goals  - Facilitate patient/family articulation of goals for care  - Help patient/family identify pros/cons of alternative solutions  - Provide information as requested by patient/family  - Respect patient/family rights related to privacy and sharing information   - Serve as a liaison between patient, family and health care team  - Initiate consults as appropriate (Ethics Team, Palliative Care, Family Care Conference, etc )  Outcome: Progressing     Problem: BEHAVIOR  Goal: Pt/Family maintain appropriate behavior and adhere to behavioral management agreement, if implemented  Description: INTERVENTIONS:  - Assess the family dynamic   - Encourage verbalization of thoughts and concerns in a socially appropriate manner  - Assess patient/family's coping skills and non-compliant behavior (including use of illegal substances)    - Utilize positive, consistent limit setting strategies supporting safety of patient, staff and others  - Initiate consult with Case Management, Spiritual Care or other ancillary services as appropriate  - If a patient's/visitor's behavior jeopardizes the safety of the patient, staff, or others, refer to organization procedure  - Notify Security of behavior or suspected illegal substances which indicate the need for search of the patient and/or belongings  - Encourage participation in the decision making process about a behavioral management agreement; implement if patient meets criteria  Outcome: Progressing     Problem: Nutrition/Hydration-ADULT  Goal: Nutrient/Hydration intake appropriate for improving, restoring or maintaining nutritional needs  Description: Monitor and assess patient's nutrition/hydration status for malnutrition  Collaborate with interdisciplinary team and initiate plan and interventions as ordered  Monitor patient's weight and dietary intake as ordered or per policy  Utilize nutrition screening tool and intervene as necessary  Determine patient's food preferences and provide high-protein, high-caloric foods as appropriate       INTERVENTIONS:  - Monitor oral intake, urinary output, labs, and treatment plans  - Assess nutrition and hydration status and recommend course of action  - Evaluate amount of meals eaten  - Assist patient with eating if necessary   - Allow adequate time for meals  - Recommend/ encourage appropriate diets, oral nutritional supplements, and vitamin/mineral supplements  - Order, calculate, and assess calorie counts as needed  - Recommend, monitor, and adjust tube feedings and TPN/PPN based on assessed needs  - Assess need for intravenous fluids  - Provide specific nutrition/hydration education as appropriate  - Include patient/family/caregiver in decisions related to nutrition  Outcome: Progressing

## 2020-09-11 NOTE — CASE MANAGEMENT
Cm spoke to Pearl Huntley Esq regarding the finding that the pt's documented POA has dementia and has no capacity  Mr Shana Patel explained that due to this fact, we can explore other options for pt's medical decision maker via [place]     [place], decisional hierarchy is as follows, in decreasing order of authority:     1st - Current spouse (unless one of the parties has filed for divorce), PLUS any and all children from previous union(s) - NONE       2nd - All adult child(jose) from any union - NONE     3rd - Parent(s), either by blood or legal adoption - NONE     4th - Adult sibling(s), either by blood or legal adoption -None     5th - Adult grandchild(jose) from any union - NONE     6th - Any adult who is familiar with pt's wishes, desires, concerns, and/or medical history -  Friend Deepa Rankin and 1st cousin Levon Saavedra      CM contacted Ambreen Grant regarding this new development in order to discuss his thoughts as well as SNF options

## 2020-09-11 NOTE — CASE MANAGEMENT
LUL spoke to Enoch regarding d/c plan  Enoch is more than happy to assist with placement and filling out paperwork  Marquez's preference is [place], [address] [facility] and then [place] [facility] [place]  LUL informed

## 2020-09-11 NOTE — ASSESSMENT & PLAN NOTE
- Chronic/baseline history of dementia  - Delirium precautions  - Geriatric Medicine evaluation and recommendations appreciated  - Continue current medication regimen   - Outpatient follow-up with PCP  ASSESSMENT  77F, PMHx Afib on Coumadin, HTN, DM, COPD, presents to ED s/p fall, +HT, +LOC, +AC. Hospital course complicated by fever.       IMPRESSION  # Fever, possible UTI, sepsis T>101 P>90 not present on admission    UCX >100k GNR    No evidence of cholecystitis. HIDA NEGATIVE     < from: US Abdomen Limited (05.06.20 @ 14:56) >Gallstones and sludge with gallbladder wall thickening features suggestive of acute cholecystitis in the proper clinical setting.    5/6 BCX NGTD   #Obesity BMI (kg/m2): 30.1  #DM Hemoglobin A1C, Whole Blood: 6.8 (01-31-20 @ 07:01)    RECOMMENDATIONS  - DECREASE to zosyn 2.25 q6h IV for CrCl  - F/u GNR urine culture  This is a preliminary incomplete pended note, all final recommendations to follow.     Spectra [id number] ASSESSMENT  77F, PMHx Afib on Coumadin, HTN, DM, COPD, presents to ED s/p fall, +HT, +LOC, +AC. Hospital course complicated by fever.       IMPRESSION  # Fever, possible UTI, sepsis T>101 P>90 not present on admission    UCX >100k GNR    No evidence of cholecystitis. HIDA NEGATIVE     Procalcitonin, Serum: 7.94 ng/mL (05-05-20 @ 11:48)    < from: US Abdomen Limited (05.06.20 @ 14:56) >Gallstones and sludge with gallbladder wall thickening features suggestive of acute cholecystitis in the proper clinical setting.    5/6 BCX NGTD   #Obesity BMI (kg/m2): 30.1  #DM Hemoglobin A1C, Whole Blood: 6.8 (01-31-20 @ 07:01)    RECOMMENDATIONS  - DECREASE to zosyn 2.25 q6h IV for CrCl  - F/u GNR urine culture  - difficult to obtain history from patient    Spectra [id number]

## 2020-09-11 NOTE — PROGRESS NOTES
Progress Note - Tom Conley 1945, 66 y o  male MRN: [id number]    Unit/Bed#: [facility] 603-01 Encounter: [id number]    Primary Care Provider: Jackie Hill MD   Date and time admitted to hospital: 8/28/2020 12:43 PM        [address]  - Suspected unwitnessed fall with unknown loss of consciousness and the below noted injuries  - Fall precautions  - Geriatric Medicine evaluation and recommendations appreciated  - PT and OT evaluation and treatment  - Case Management consultation for disposition planning/expected post acute care facility need  Awaiting placement  Encephalopathy acute  Assessment & Plan  - Acute encephalopathy on admission, likely multifactorial in setting of traumatic brain injury, multiple wounds with rhabdomyolysis and acute kidney injury, sepsis  Now significantly improved  - Blood cultures x2 with 1 of the tubes growing coagulase-negative Staphylococcus and Pantoea agglomerans  Patient was started on Ancef based on sensitivities  Per ID, initial cultures likely a contaminant  Repeat cultures x2 negative at 5 days, patient received 7 days abx, discontinued 9/4/2020   - Inpatient wound care evaluation and recommendations appreciated  - Delirium precautions  - Now appears to be at baseline mental status  * TBI (traumatic brain injury) ([facility] [address] )  Assessment & Plan  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 as well as suspected trace subarachnoid hemorrhage in the left parietal region on initial CT scan  On repeat CT scan of head on 8/28/2020 following transferred to [facility], there appeared to be slight increase of the bilateral intraventricular hemorrhage, blossoming of the left parietal subarachnoid hemorrhage, bilateral subdural hematomas and suspected bleeding near the brainstem  - Repeat CT head on 8/30/2020 demonstrated improvement and/or stable hemorrhages    - Appreciate Neurosurgery evaluation and recommendations  No operative intervention recommended  - Repeat CTH 9/4: Interval improvement in the previously seen intracranial hemorrhage  No significant mass effect or midline shift  No new intracranial hemorrhage is seen  - Continue Keppra to complete a 7 day course, ended 9/4/2020   - Patient cleared for chemical DVT prophylaxis with subcutaneous heparin, all other anti-platelet and anticoagulant medications to be held until cleared by Neurosurgery  They are recommending a 4 week f/u with repeat CT head at th at time  - Continue to monitor neurologic exam   - Continue PT and OT evaluation and treatment as indicated  Subarachnoid hemorrhage ([address] )  Assessment & Plan  - Please see outlined management under TBI diagnosis  Subdural hematoma ([facility])  Assessment & Plan  - Please see outlined management under TBI diagnosis  IVH (intraventricular hemorrhage) ([facility] [address] )  Assessment & Plan  - Please see outlined management under TBI diagnosis  Dementia [facility])  Assessment & Plan  - Chronic/baseline history of dementia  - Delirium precautions  - Geriatric Medicine evaluation and recommendations appreciated  - Continue current medication regimen   - Outpatient follow-up with PCP  Type 2 diabetes mellitus, without long-term current use of insulin [facility])  Assessment & Plan    Lab Results   Component Value Date    HGBA1C 5 7 (H) 08/29/2020     - Documented history of type 2 diabetes mellitus with questionable use of metformin per only available medication list from February 2018  - Currently without hyperglycemia on initial lab workup and most recent hemoglobin A1c from March 2020 was 5 7  - Not on ISS, continue to monitor via routine labwork    Abrasions of multiple sites  Assessment & Plan  - Abrasions to multiple sites including all 4 extremities and his back  - Frequent repositioning, every 2 hours  - Inpatient Wound Care team following    Appreciate their recommendations  - Continue local wound care  Pressure injury of back, stage 1  Assessment & Plan  - Suspected stage I pressure injury of the back  - Inpatient Wound Care service evaluation and recommendations appreciated  - Continue with local wound care to multiple wound sites  Disposition: continue med-surg status      SUBJECTIVE:  Chief Complaint: "I need one of those buttons you push when you fall in your house!"    Subjective: Patient is comfortable and is expressing the desire to have a life alert button at home  I explain to him that he is going to most likely go to rehab from here, but in the event he does go "home" that he will have 24/7 care which will negate the need for this  He seems to nod in understanding         OBJECTIVE:     Meds/Allergies     Current Facility-Administered Medications:     acetaminophen (TYLENOL) tablet 650 mg, 650 mg, Oral, Q6H PRN, Olman May PA-C, 650 mg at 09/10/20 1202    brimonidine (ALPHAGAN P) 0 15 % ophthalmic solution 1 drop, 1 drop, Both Eyes, Q8H [facility], 1 drop at 09/11/20 0558    chlorhexidine (PERIDEX) 0 12 % oral rinse 15 mL, 15 mL, Swish & Spit, Q12H [facility], Olman May PA-C, 15 mL at 09/11/20 0916    heparin (porcine) subcutaneous injection 5,000 Units, 5,000 Units, Subcutaneous, Q8H [facility], [facility], 5,000 Units at 09/11/20 0558    lidocaine (LIDODERM) 5 % patch 1 patch, 1 patch, Topical, Daily, Olman May PA-C, 1 patch at 09/11/20 0916    melatonin tablet 6 mg, 6 mg, Oral, HS, Nora Sandhu, DO, 6 mg at 09/10/20 2207    ondansetron (ZOFRAN) injection 4 mg, 4 mg, Intravenous, Q6H PRN, Olman May PA-C    travoprost (TRAVATAN-Z) 0 004 % ophthalmic solution 1 drop, 1 drop, Both Eyes, HS, Soledad Mcbride PA-C, 1 drop at 09/10/20 2208    ziprasidone (GEODON) capsule 60 mg, 60 mg, Oral, Daily, [facility] PA-C, 60 mg at 09/11/20 0916    ziprasidone (GEODON) capsule 80 mg, 80 mg, Oral, Renea Lino PA-C, 80 mg at 09/10/20 1712     Vitals:   Vitals:    09/11/20 1431   BP: 142/80   Pulse: 98   Resp:    Temp:    SpO2: 99%       Intake/Output:  I/O       09/09 0701 - 09/10 0700 09/10 0701 - 09/11 0700 09/11 0701 - 09/12 0700    P  O  1215 780 660    Total Intake(mL/kg) 1215 (17 4) 780 (11 2) 660 (9 4)    Urine (mL/kg/hr) 775 (0 5) 1450 (0 9)     Stool  0     Total Output 775 1450     Net +440 -670 +660           Unmeasured Urine Occurrence 5 x 3 x     Unmeasured Stool Occurrence  1 x            Nutrition/GI Proph/Bowel Reg: Regular    Physical Exam:   GENERAL APPEARANCE: NAD  NEURO: GCS 15,non-focal  HEENT: NCAT  CV: RRR, no MGR  LUNGS: CTA bilaterally  GI: soft,non-tender,non-distended  : voiding  MSK: moving all equally  SKIN: pink, warm, dry    Invasive Devices     Drain            External Urinary Catheter Small 5 days                 Lab Results: Results: I have personally reviewed pertinent reports  Imaging/EKG Studies: Results: I have personally reviewed pertinent reports      Other Studies: no new  VTE Prophylaxis: Sequential compression device (Venodyne)  and Heparin

## 2020-09-11 NOTE — ASSESSMENT & PLAN NOTE
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 as well as suspected trace subarachnoid hemorrhage in the left parietal region on initial CT scan  On repeat CT scan of head on 8/28/2020 following transferred to [facility], there appeared to be slight increase of the bilateral intraventricular hemorrhage, blossoming of the left parietal subarachnoid hemorrhage, bilateral subdural hematomas and suspected bleeding near the brainstem  - Repeat CT head on 8/30/2020 demonstrated improvement and/or stable hemorrhages  - Appreciate Neurosurgery evaluation and recommendations  No operative intervention recommended  - Repeat CTH 9/4: Interval improvement in the previously seen intracranial hemorrhage  No significant mass effect or midline shift  No new intracranial hemorrhage is seen  - Continue Keppra to complete a 7 day course, ended 9/4/2020   - Patient cleared for chemical DVT prophylaxis with subcutaneous heparin, all other anti-platelet and anticoagulant medications to be held until cleared by Neurosurgery  They are recommending a 4 week f/u with repeat CT head at th at time  - Continue to monitor neurologic exam   - Continue PT and OT evaluation and treatment as indicated

## 2020-09-11 NOTE — ASSESSMENT & PLAN NOTE
- Acute encephalopathy on admission, likely multifactorial in setting of traumatic brain injury, multiple wounds with rhabdomyolysis and acute kidney injury, sepsis  Now significantly improved  - Blood cultures x2 with 1 of the tubes growing coagulase-negative Staphylococcus and Pantoea agglomerans  Patient was started on Ancef based on sensitivities  Per ID, initial cultures likely a contaminant  Repeat cultures x2 negative at 5 days, patient received 7 days abx, discontinued 9/4/2020   - Inpatient wound care evaluation and recommendations appreciated  - Delirium precautions  - Now appears to be at baseline mental status

## 2020-09-12 LAB
ANION GAP SERPL CALCULATED.3IONS-SCNC: 7 MMOL/L (ref 4–13)
BASOPHILS # BLD AUTO: 0.06 THOUSANDS/ΜL (ref 0–0.1)
BASOPHILS NFR BLD AUTO: 1 % (ref 0–1)
BUN SERPL-MCNC: 18 MG/DL (ref 5–25)
CALCIUM SERPL-MCNC: 9.7 MG/DL (ref 8.3–10.1)
CHLORIDE SERPL-SCNC: 104 MMOL/L (ref 100–108)
CO2 SERPL-SCNC: 28 MMOL/L (ref 21–32)
CREAT SERPL-MCNC: 0.79 MG/DL (ref 0.6–1.3)
EOSINOPHIL # BLD AUTO: 0.12 THOUSAND/ΜL (ref 0–0.61)
EOSINOPHIL NFR BLD AUTO: 1 % (ref 0–6)
ERYTHROCYTE [DISTWIDTH] IN BLOOD BY AUTOMATED COUNT: 14.4 % (ref 11.6–15.1)
GFR SERPL CREATININE-BSD FRML MDRD: 87 ML/MIN/1.73SQ M
GLUCOSE SERPL-MCNC: 94 MG/DL (ref 65–140)
HCT VFR BLD AUTO: 39.4 % (ref 36.5–49.3)
HGB BLD-MCNC: 12.5 G/DL (ref 12–17)
IMM GRANULOCYTES # BLD AUTO: 0.15 THOUSAND/UL (ref 0–0.2)
IMM GRANULOCYTES NFR BLD AUTO: 2 % (ref 0–2)
LYMPHOCYTES # BLD AUTO: 1.93 THOUSANDS/ΜL (ref 0.6–4.47)
LYMPHOCYTES NFR BLD AUTO: 21 % (ref 14–44)
MCH RBC QN AUTO: 30.3 PG (ref 26.8–34.3)
MCHC RBC AUTO-ENTMCNC: 31.7 G/DL (ref 31.4–37.4)
MCV RBC AUTO: 96 FL (ref 82–98)
MONOCYTES # BLD AUTO: 0.94 THOUSAND/ΜL (ref 0.17–1.22)
MONOCYTES NFR BLD AUTO: 10 % (ref 4–12)
NEUTROPHILS # BLD AUTO: 5.92 THOUSANDS/ΜL (ref 1.85–7.62)
NEUTS SEG NFR BLD AUTO: 65 % (ref 43–75)
NRBC BLD AUTO-RTO: 0 /100 WBCS
PLATELET # BLD AUTO: 455 THOUSANDS/UL (ref 149–390)
PMV BLD AUTO: 9.6 FL (ref 8.9–12.7)
POTASSIUM SERPL-SCNC: 3.7 MMOL/L (ref 3.5–5.3)
RBC # BLD AUTO: 4.12 MILLION/UL (ref 3.88–5.62)
SODIUM SERPL-SCNC: 139 MMOL/L (ref 136–145)
WBC # BLD AUTO: 9.12 THOUSAND/UL (ref 4.31–10.16)

## 2020-09-12 PROCEDURE — 80048 BASIC METABOLIC PNL TOTAL CA: CPT | Performed by: PHYSICIAN ASSISTANT

## 2020-09-12 PROCEDURE — 85025 COMPLETE CBC W/AUTO DIFF WBC: CPT | Performed by: PHYSICIAN ASSISTANT

## 2020-09-12 PROCEDURE — 99232 SBSQ HOSP IP/OBS MODERATE 35: CPT | Performed by: SURGERY

## 2020-09-12 RX ADMIN — HEPARIN SODIUM 5000 UNITS: 5000 INJECTION INTRAVENOUS; SUBCUTANEOUS at 21:09

## 2020-09-12 RX ADMIN — HEPARIN SODIUM 5000 UNITS: 5000 INJECTION INTRAVENOUS; SUBCUTANEOUS at 06:00

## 2020-09-12 RX ADMIN — MELATONIN 6 MG: at 21:09

## 2020-09-12 RX ADMIN — BACITRACIN ZINC 1 LARGE APPLICATION: 500 OINTMENT TOPICAL at 09:08

## 2020-09-12 RX ADMIN — BACITRACIN ZINC 1 LARGE APPLICATION: 500 OINTMENT TOPICAL at 17:22

## 2020-09-12 RX ADMIN — BRIMONIDINE TARTRATE 1 DROP: 1.5 SOLUTION OPHTHALMIC at 13:17

## 2020-09-12 RX ADMIN — BRIMONIDINE TARTRATE 1 DROP: 1.5 SOLUTION OPHTHALMIC at 21:09

## 2020-09-12 RX ADMIN — CHLORHEXIDINE GLUCONATE 0.12% ORAL RINSE 15 ML: 1.2 LIQUID ORAL at 21:09

## 2020-09-12 RX ADMIN — ZIPRASIDONE HYDROCHLORIDE 80 MG: 40 CAPSULE ORAL at 17:22

## 2020-09-12 RX ADMIN — HEPARIN SODIUM 5000 UNITS: 5000 INJECTION INTRAVENOUS; SUBCUTANEOUS at 13:16

## 2020-09-12 RX ADMIN — LIDOCAINE 5% 1 PATCH: 700 PATCH TOPICAL at 09:08

## 2020-09-12 RX ADMIN — ZIPRASIDONE HYDROCHLORIDE 60 MG: 20 CAPSULE ORAL at 09:07

## 2020-09-12 RX ADMIN — TRAVOPROST 1 DROP: 0.04 SOLUTION/ DROPS OPHTHALMIC at 21:09

## 2020-09-12 RX ADMIN — CHLORHEXIDINE GLUCONATE 0.12% ORAL RINSE 15 ML: 1.2 LIQUID ORAL at 09:08

## 2020-09-12 NOTE — ASSESSMENT & PLAN NOTE
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 as well as suspected trace subarachnoid hemorrhage in the left parietal region on initial CT scan  On repeat CT scan of head on 8/28/2020 following transferred to [facility], there appeared to be slight increase of the bilateral intraventricular hemorrhage, blossoming of the left parietal subarachnoid hemorrhage, bilateral subdural hematomas and suspected bleeding near the brainstem  - Repeat CT head on 8/30/2020 demonstrated improvement and/or stable hemorrhages  - Appreciate Neurosurgery evaluation and recommendations  No operative intervention recommended  - Repeat CTH 9/4: Interval improvement in the previously seen intracranial hemorrhage  No significant mass effect or midline shift  No new intracranial hemorrhage is seen  - Continue Keppra to complete a 7 day course, ended 9/4/2020   - Patient cleared for chemical DVT prophylaxis with subcutaneous heparin, all other anti-platelet and anticoagulant medications to be held until cleared by Neurosurgery  They are recommending a 4 week f/u with repeat CT head at th at time  - Continue to monitor neurologic exam   - Continue PT and OT evaluation and treatment as indicated

## 2020-09-12 NOTE — PROGRESS NOTES
Progress Note - Laure Stephen 1945, 66 y o  male MRN: [id number]    Unit/Bed#: [facility] 603-01 Encounter: [id number]    Primary Care Provider: Orlando Zelaya MD   Date and time admitted to hospital: 8/28/2020 12:43 PM        [address]  - Suspected unwitnessed fall with unknown loss of consciousness and the below noted injuries  - Fall precautions  - Geriatric Medicine evaluation and recommendations appreciated  - PT and OT evaluation and treatment  - Case Management consultation for disposition planning/expected post acute care facility need  Awaiting placement  Encephalopathy acute  Assessment & Plan  - Acute encephalopathy on admission, likely multifactorial in setting of traumatic brain injury, multiple wounds with rhabdomyolysis and acute kidney injury, sepsis  Now significantly improved  - Blood cultures x2 with 1 of the tubes growing coagulase-negative Staphylococcus and Pantoea agglomerans  Patient was started on Ancef based on sensitivities  Per ID, initial cultures likely a contaminant  Repeat cultures x2 negative at 5 days, patient received 7 days abx, discontinued 9/4/2020   - Inpatient wound care evaluation and recommendations appreciated  - Delirium precautions  - Now appears to be at baseline mental status  * TBI (traumatic brain injury) ([facility] [address] )  Assessment & Plan  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 as well as suspected trace subarachnoid hemorrhage in the left parietal region on initial CT scan  On repeat CT scan of head on 8/28/2020 following transferred to [facility], there appeared to be slight increase of the bilateral intraventricular hemorrhage, blossoming of the left parietal subarachnoid hemorrhage, bilateral subdural hematomas and suspected bleeding near the brainstem  - Repeat CT head on 8/30/2020 demonstrated improvement and/or stable hemorrhages    - Appreciate Neurosurgery evaluation and recommendations  No operative intervention recommended  - Repeat CTH 9/4: Interval improvement in the previously seen intracranial hemorrhage  No significant mass effect or midline shift  No new intracranial hemorrhage is seen  - Continue Keppra to complete a 7 day course, ended 9/4/2020   - Patient cleared for chemical DVT prophylaxis with subcutaneous heparin, all other anti-platelet and anticoagulant medications to be held until cleared by Neurosurgery  They are recommending a 4 week f/u with repeat CT head at th at time  - Continue to monitor neurologic exam   - Continue PT and OT evaluation and treatment as indicated  Subarachnoid hemorrhage ([address] )  Assessment & Plan  - Please see outlined management under TBI diagnosis  Subdural hematoma (H[facility])  Assessment & Plan  - Please see outlined management under TBI diagnosis  IVH (intraventricular hemorrhage) ([facility] [address] )  Assessment & Plan  - Please see outlined management under TBI diagnosis  Dementia [facility])  Assessment & Plan  - Chronic/baseline history of dementia  - Delirium precautions  - Geriatric Medicine evaluation and recommendations appreciated  - Continue current medication regimen   - Outpatient follow-up with PCP  Type 2 diabetes mellitus, without long-term current use of insulin [facility])  Assessment & Plan    Lab Results   Component Value Date    HGBA1C 5 7 (H) 08/29/2020     - Documented history of type 2 diabetes mellitus with questionable use of metformin per only available medication list from February 2018  - Currently without hyperglycemia on initial lab workup and most recent hemoglobin A1c from March 2020 was 5 7  - Not on ISS, continue to monitor via routine labwork    Abrasions of multiple sites  Assessment & Plan  - Abrasions to multiple sites including all 4 extremities and his back  - Frequent repositioning, every 2 hours  - Inpatient Wound Care team following    Appreciate their recommendations  - Continue local wound care  Pressure injury of back, stage 1  Assessment & Plan  - Suspected stage I pressure injury of the back  - Inpatient Wound Care service evaluation and recommendations appreciated  - Continue with local wound care to multiple wound sites  Disposition: continue med-surg status, placement at [facility] pending      SUBJECTIVE:  Chief Complaint: "I'm good"    Subjective: Patient knows he is in [place] and does not need anything currently  He denies pain  No new complaints  Sleeping well         OBJECTIVE:     Meds/Allergies     Current Facility-Administered Medications:     acetaminophen (TYLENOL) tablet 650 mg, 650 mg, Oral, Q6H PRN, James Quinteros PA-C, 650 mg at 09/10/20 1202    bacitracin topical ointment 1 large application, 1 large application, Topical, BID, Bishop Daniel PA-C, 1 large application at 88/51/47 0908    brimonidine (ALPHAGAN P) 0 15 % ophthalmic solution 1 drop, 1 drop, Both Eyes, Q8H [address], Soledad Mcbride PA-C, 1 drop at 09/12/20 1317    chlorhexidine (PERIDEX) 0 12 % oral rinse 15 mL, 15 mL, Swish & Spit, Q12H [address], Cory Koroma PA-C, 15 mL at 09/12/20 0908    heparin (porcine) subcutaneous injection 5,000 Units, 5,000 Units, Subcutaneous, Q8H [address], Bishop Daniel PA-C, 5,000 Units at 09/12/20 1316    lidocaine (LIDODERM) 5 % patch 1 patch, 1 patch, Topical, Daily, James Quinteros PA-C, 1 patch at 09/12/20 0908    melatonin tablet 6 mg, 6 mg, Oral, HS, Nora BEE Phoebe, DO, 6 mg at 09/11/20 2233    ondansetron (ZOFRAN) injection 4 mg, 4 mg, Intravenous, Q6H PRN, James Quinteros PA-C    travoprost (TRAVATAN-Z) 0 004 % ophthalmic solution 1 drop, 1 drop, Both Eyes, HS, Soledad Mcbride PA-C, 1 drop at 09/11/20 2233    ziprasidone (GEODON) capsule 60 mg, 60 mg, Oral, Daily, Bishop Daniel PA-C, 60 mg at 09/12/20 0907    ziprasidone (GEODON) capsule 80 mg, 80 mg, Oral, QPM, Bishop Daniel PA-C, 80 mg at 09/11/20 170 Vitals:   Vitals:    09/12/20 0734   BP: 125/73   Pulse: 82   Resp: 16   Temp: 98 1 °F (36 7 °C)   SpO2: 99%       Intake/Output:  I/O       09/10 0701 - 09/11 0700 09/11 0701 - 09/12 0700 09/12 0701 - 09/13 0700    P  O  780 1020 240    Total Intake(mL/kg) 780 (11 2) 1020 (14 6) 240 (3 4)    Urine (mL/kg/hr) 1450 (0 9)  550 (1)    Stool 0      Total Output 1450  550    Net -670 +1020 -310           Unmeasured Urine Occurrence 3 x      Unmeasured Stool Occurrence 1 x             Nutrition/GI Proph/Bowel Reg: Regular    Physical Exam:   GENERAL APPEARANCE: NAD  NEURO: GCS 14 (4, 4, 6), non-focal  HEENT: NCAT  CV: RRR, no MGR  LUNGS: CTA bilaterally  GI: soft,non-tender,non-distended  : voiding  MSK: moving all equally; + RLE abrasions well healing  SKIN: pink,w arm,d ry    Invasive Devices     Drain            External Urinary Catheter Small 6 days                 Lab Results:   Results: I have personally reviewed pertinent reports   , BMP/CMP:   Lab Results   Component Value Date    SODIUM 139 09/12/2020    K 3 7 09/12/2020     09/12/2020    CO2 28 09/12/2020    BUN 18 09/12/2020    CREATININE 0 79 09/12/2020    CALCIUM 9 7 09/12/2020    EGFR 87 09/12/2020    and CBC:   Lab Results   Component Value Date    WBC 9 12 09/12/2020    HGB 12 5 09/12/2020    HCT 39 4 09/12/2020    MCV 96 09/12/2020     (H) 09/12/2020    MCH 30 3 09/12/2020    MCHC 31 7 09/12/2020    RDW 14 4 09/12/2020    MPV 9 6 09/12/2020    NRBC 0 09/12/2020     Imaging/EKG Studies: Results: I have personally reviewed pertinent reports      Other Studies: no new  VTE Prophylaxis: Sequential compression device (Venodyne)  and Heparin

## 2020-09-12 NOTE — PLAN OF CARE
Problem: Potential for Falls  Goal: Patient will remain free of falls  Description: INTERVENTIONS:  - Assess patient frequently for physical needs  -  Identify cognitive and physical deficits and behaviors that affect risk of falls    -  [place] fall precautions as indicated by assessment   - Educate patient/family on patient safety including physical limitations  - Instruct patient to call for assistance with activity based on assessment  - Modify environment to reduce risk of injury  - Consider OT/PT consult to assist with strengthening/mobility  Outcome: Progressing     Problem: PAIN - ADULT  Goal: Verbalizes/displays adequate comfort level or baseline comfort level  Description: Interventions:  - Encourage patient to monitor pain and request assistance  - Assess pain using appropriate pain scale  - Administer analgesics based on type and severity of pain and evaluate response  - Implement non-pharmacological measures as appropriate and evaluate response  - Consider cultural and social influences on pain and pain management  - Notify physician/advanced practitioner if interventions unsuccessful or patient reports new pain  Outcome: Progressing     Problem: INFECTION - ADULT  Goal: Absence or prevention of progression during hospitalization  Description: INTERVENTIONS:  - Assess and monitor for signs and symptoms of infection  - Monitor lab/diagnostic results  - Monitor all insertion sites, i e  indwelling lines, tubes, and drains  - Monitor endotracheal if appropriate and nasal secretions for changes in amount and color  - [place] appropriate cooling/warming therapies per order  - Administer medications as ordered  - Instruct and encourage patient and family to use good hand hygiene technique  - Identify and instruct in appropriate isolation precautions for identified infection/condition  Outcome: Progressing     Problem: SAFETY ADULT  Goal: Patient will remain free of falls  Description: INTERVENTIONS:  - Assess patient frequently for physical needs  -  Identify cognitive and physical deficits and behaviors that affect risk of falls    -  [place] fall precautions as indicated by assessment   - Educate patient/family on patient safety including physical limitations  - Instruct patient to call for assistance with activity based on assessment  - Modify environment to reduce risk of injury  - Consider OT/PT consult to assist with strengthening/mobility  Outcome: Progressing  Goal: Maintain or return to baseline ADL function  Description: INTERVENTIONS:  -  Assess patient's ability to carry out ADLs; assess patient's baseline for ADL function and identify physical deficits which impact ability to perform ADLs (bathing, care of mouth/teeth, toileting, grooming, dressing, etc )  - Assess/evaluate cause of self-care deficits   - Assess range of motion  - Assess patient's mobility; develop plan if impaired  - Assess patient's need for assistive devices and provide as appropriate  - Encourage maximum independence but intervene and supervise when necessary  - Involve family in performance of ADLs  - Assess for home care needs following discharge   - Consider OT consult to assist with ADL evaluation and planning for discharge  - Provide patient education as appropriate  Outcome: Progressing  Goal: Maintain or return mobility status to optimal level  Description: INTERVENTIONS:  - Assess patient's baseline mobility status (ambulation, transfers, stairs, etc )    - Identify cognitive and physical deficits and behaviors that affect mobility  - Identify mobility aids required to assist with transfers and/or ambulation (gait belt, sit-to-stand, lift, walker, cane, etc )  - [place] fall precautions as indicated by assessment  - Record patient progress and toleration of activity level on Mobility SBAR; progress patient to next Phase/Stage  - Instruct patient to call for assistance with activity based on assessment  - Consider rehabilitation consult to assist with strengthening/weightbearing, etc   Outcome: Progressing     Problem: DISCHARGE PLANNING  Goal: Discharge to home or other facility with appropriate resources  Description: INTERVENTIONS:  - Identify barriers to discharge w/patient and caregiver  - Arrange for needed discharge resources and transportation as appropriate  - Identify discharge learning needs (meds, wound care, etc )  - Arrange for interpretive services to assist at discharge as needed  - Refer to Case Management Department for coordinating discharge planning if the patient needs post-hospital services based on physician/advanced practitioner order or complex needs related to functional status, cognitive ability, or social support system  Outcome: Progressing     Problem: Knowledge Deficit  Goal: Patient/family/caregiver demonstrates understanding of disease process, treatment plan, medications, and discharge instructions  Description: Complete learning assessment and assess knowledge base    Interventions:  - Provide teaching at level of understanding  - Provide teaching via preferred learning methods  Outcome: Progressing     Problem: NEUROSENSORY - ADULT  Goal: Achieves stable or improved neurological status  Description: INTERVENTIONS  - Monitor and report changes in neurological status  - Monitor vital signs such as temperature, blood pressure, glucose, and any other labs ordered   - Initiate measures to prevent increased intracranial pressure  - Monitor for seizure activity and implement precautions if appropriate      Outcome: Progressing  Goal: Remains free of injury related to seizures activity  Description: INTERVENTIONS  - Maintain airway, patient safety  and administer oxygen as ordered  - Monitor patient for seizure activity, document and report duration and description of seizure to physician/advanced practitioner  - If seizure occurs,  ensure patient safety during seizure  - Reorient patient post seizure  - Seizure pads on all 4 side rails  - Instruct patient/family to notify RN of any seizure activity including if an aura is experienced  - Instruct patient/family to call for assistance with activity based on nursing assessment  - Administer anti-seizure medications if ordered    Outcome: Progressing  Goal: Achieves maximal functionality and self care  Description: INTERVENTIONS  - Monitor swallowing and airway patency with patient fatigue and changes in neurological status  - Encourage and assist patient to increase activity and self care     - Encourage visually impaired, hearing impaired and aphasic patients to use assistive/communication devices  Outcome: Progressing     Problem: SKIN/TISSUE INTEGRITY - ADULT  Goal: Skin integrity remains intact  Description: INTERVENTIONS  - Identify patients at risk for skin breakdown  - Assess and monitor skin integrity  - Assess and monitor nutrition and hydration status  - Monitor labs (i e  albumin)  - Assess for incontinence   - Turn and reposition patient  - Assist with mobility/ambulation  - Relieve pressure over bony prominences  - Avoid friction and shearing  - Provide appropriate hygiene as needed including keeping skin clean and dry  - Evaluate need for skin moisturizer/barrier cream  - Collaborate with interdisciplinary team (i e  Nutrition, Rehabilitation, etc )   - Patient/family teaching  Outcome: Progressing  Goal: Incision(s), wounds(s) or drain site(s) healing without S/S of infection  Description: INTERVENTIONS  - Assess and document risk factors for skin impairment   - Assess and document dressing, incision, wound bed, drain sites and surrounding tissue  - Consider nutrition services referral as needed  - Oral mucous membranes remain intact  - Provide patient/ family education  Outcome: Progressing  Goal: Oral mucous membranes remain intact  Description: INTERVENTIONS  - Assess oral mucosa and hygiene practices  - Implement preventative oral hygiene regimen  - Implement oral medicated treatments as ordered  - Initiate Nutrition services referral as needed  Outcome: Progressing     Problem: HEMATOLOGIC - ADULT  Goal: Maintains hematologic stability  Description: INTERVENTIONS  - Assess for signs and symptoms of bleeding or hemorrhage  - Monitor labs  - Administer supportive blood products/factors as ordered and appropriate  Outcome: Progressing     Problem: Prexisting or High Potential for Compromised Skin Integrity  Goal: Skin integrity is maintained or improved  Description: INTERVENTIONS:  - Identify patients at risk for skin breakdown  - Assess and monitor skin integrity  - Assess and monitor nutrition and hydration status  - Monitor labs   - Assess for incontinence   - Turn and reposition patient  - Assist with mobility/ambulation  - Relieve pressure over bony prominences  - Avoid friction and shearing  - Provide appropriate hygiene as needed including keeping skin clean and dry  - Evaluate need for skin moisturizer/barrier cream  - Collaborate with interdisciplinary team   - Patient/family teaching  - Consider wound care consult   Outcome: Progressing     Problem: COPING  Goal: Pt/Family able to verbalize concerns and demonstrate effective coping strategies  Description: INTERVENTIONS:  - Assist patient/family to identify coping skills, available support systems and cultural and spiritual values  - Provide emotional support, including active listening and acknowledgement of concerns of patient and caregivers  - Reduce environmental stimuli, as able  - Provide patient education  - Assess for spiritual pain/suffering and initiate spiritual care, including notification of Pastoral Care or afshan based community as needed  - Assess effectiveness of coping strategies  Outcome: Progressing     Problem: DECISION MAKING  Goal: Pt/Family able to effectively weigh alternatives and participate in decision making related to treatment and care  Description: INTERVENTIONS:  - Identify decision maker  - Determine when there are differences among patient's view, family's view, and healthcare provider's view of patient condition and care goals  - Facilitate patient/family articulation of goals for care  - Help patient/family identify pros/cons of alternative solutions  - Provide information as requested by patient/family  - Respect patient/family rights related to privacy and sharing information   - Serve as a liaison between patient, family and health care team  - Initiate consults as appropriate (Ethics Team, Palliative Care, Family Care Conference, etc )  Outcome: Progressing     Problem: BEHAVIOR  Goal: Pt/Family maintain appropriate behavior and adhere to behavioral management agreement, if implemented  Description: INTERVENTIONS:  - Assess the family dynamic   - Encourage verbalization of thoughts and concerns in a socially appropriate manner  - Assess patient/family's coping skills and non-compliant behavior (including use of illegal substances)  - Utilize positive, consistent limit setting strategies supporting safety of patient, staff and others  - Initiate consult with Case Management, Spiritual Care or other ancillary services as appropriate  - If a patient's/visitor's behavior jeopardizes the safety of the patient, staff, or others, refer to organization procedure  - Notify Security of behavior or suspected illegal substances which indicate the need for search of the patient and/or belongings  - Encourage participation in the decision making process about a behavioral management agreement; implement if patient meets criteria  Outcome: Progressing     Problem: Nutrition/Hydration-ADULT  Goal: Nutrient/Hydration intake appropriate for improving, restoring or maintaining nutritional needs  Description: Monitor and assess patient's nutrition/hydration status for malnutrition  Collaborate with interdisciplinary team and initiate plan and interventions as ordered  Monitor patient's weight and dietary intake as ordered or per policy  Utilize nutrition screening tool and intervene as necessary  Determine patient's food preferences and provide high-protein, high-caloric foods as appropriate       INTERVENTIONS:  - Monitor oral intake, urinary output, labs, and treatment plans  - Assess nutrition and hydration status and recommend course of action  - Evaluate amount of meals eaten  - Assist patient with eating if necessary   - Allow adequate time for meals  - Recommend/ encourage appropriate diets, oral nutritional supplements, and vitamin/mineral supplements  - Order, calculate, and assess calorie counts as needed  - Recommend, monitor, and adjust tube feedings and TPN/PPN based on assessed needs  - Assess need for intravenous fluids  - Provide specific nutrition/hydration education as appropriate  - Include patient/family/caregiver in decisions related to nutrition  Outcome: Progressing

## 2020-09-13 PROCEDURE — 99232 SBSQ HOSP IP/OBS MODERATE 35: CPT | Performed by: SURGERY

## 2020-09-13 RX ADMIN — HEPARIN SODIUM 5000 UNITS: 5000 INJECTION INTRAVENOUS; SUBCUTANEOUS at 12:47

## 2020-09-13 RX ADMIN — BACITRACIN ZINC 1 LARGE APPLICATION: 500 OINTMENT TOPICAL at 17:27

## 2020-09-13 RX ADMIN — BRIMONIDINE TARTRATE 1 DROP: 1.5 SOLUTION OPHTHALMIC at 12:46

## 2020-09-13 RX ADMIN — LIDOCAINE 5% 1 PATCH: 700 PATCH TOPICAL at 09:07

## 2020-09-13 RX ADMIN — HEPARIN SODIUM 5000 UNITS: 5000 INJECTION INTRAVENOUS; SUBCUTANEOUS at 21:59

## 2020-09-13 RX ADMIN — TRAVOPROST 1 DROP: 0.04 SOLUTION/ DROPS OPHTHALMIC at 21:59

## 2020-09-13 RX ADMIN — CHLORHEXIDINE GLUCONATE 0.12% ORAL RINSE 15 ML: 1.2 LIQUID ORAL at 21:59

## 2020-09-13 RX ADMIN — ZIPRASIDONE HYDROCHLORIDE 80 MG: 40 CAPSULE ORAL at 17:27

## 2020-09-13 RX ADMIN — HEPARIN SODIUM 5000 UNITS: 5000 INJECTION INTRAVENOUS; SUBCUTANEOUS at 05:45

## 2020-09-13 RX ADMIN — BRIMONIDINE TARTRATE 1 DROP: 1.5 SOLUTION OPHTHALMIC at 05:45

## 2020-09-13 RX ADMIN — ZIPRASIDONE HYDROCHLORIDE 60 MG: 20 CAPSULE ORAL at 09:07

## 2020-09-13 RX ADMIN — MELATONIN 6 MG: at 21:59

## 2020-09-13 RX ADMIN — BACITRACIN ZINC 1 LARGE APPLICATION: 500 OINTMENT TOPICAL at 09:08

## 2020-09-13 RX ADMIN — BRIMONIDINE TARTRATE 1 DROP: 1.5 SOLUTION OPHTHALMIC at 21:59

## 2020-09-13 NOTE — PLAN OF CARE
Problem: Potential for Falls  Goal: Patient will remain free of falls  Description: INTERVENTIONS:  - Assess patient frequently for physical needs  -  Identify cognitive and physical deficits and behaviors that affect risk of falls    -  [place] fall precautions as indicated by assessment   - Educate patient/family on patient safety including physical limitations  - Instruct patient to call for assistance with activity based on assessment  - Modify environment to reduce risk of injury  - Consider OT/PT consult to assist with strengthening/mobility  Outcome: Progressing     Problem: PAIN - ADULT  Goal: Verbalizes/displays adequate comfort level or baseline comfort level  Description: Interventions:  - Encourage patient to monitor pain and request assistance  - Assess pain using appropriate pain scale  - Administer analgesics based on type and severity of pain and evaluate response  - Implement non-pharmacological measures as appropriate and evaluate response  - Consider cultural and social influences on pain and pain management  - Notify physician/advanced practitioner if interventions unsuccessful or patient reports new pain  Outcome: Progressing     Problem: INFECTION - ADULT  Goal: Absence or prevention of progression during hospitalization  Description: INTERVENTIONS:  - Assess and monitor for signs and symptoms of infection  - Monitor lab/diagnostic results  - Monitor all insertion sites, i e  indwelling lines, tubes, and drains  - Monitor endotracheal if appropriate and nasal secretions for changes in amount and color  - [place] appropriate cooling/warming therapies per order  - Administer medications as ordered  - Instruct and encourage patient and family to use good hand hygiene technique  - Identify and instruct in appropriate isolation precautions for identified infection/condition  Outcome: Progressing     Problem: SAFETY ADULT  Goal: Patient will remain free of falls  Description: INTERVENTIONS:  - Assess patient frequently for physical needs  -  Identify cognitive and physical deficits and behaviors that affect risk of falls    -  [place] fall precautions as indicated by assessment   - Educate patient/family on patient safety including physical limitations  - Instruct patient to call for assistance with activity based on assessment  - Modify environment to reduce risk of injury  - Consider OT/PT consult to assist with strengthening/mobility  Outcome: Progressing  Goal: Maintain or return to baseline ADL function  Description: INTERVENTIONS:  -  Assess patient's ability to carry out ADLs; assess patient's baseline for ADL function and identify physical deficits which impact ability to perform ADLs (bathing, care of mouth/teeth, toileting, grooming, dressing, etc )  - Assess/evaluate cause of self-care deficits   - Assess range of motion  - Assess patient's mobility; develop plan if impaired  - Assess patient's need for assistive devices and provide as appropriate  - Encourage maximum independence but intervene and supervise when necessary  - Involve family in performance of ADLs  - Assess for home care needs following discharge   - Consider OT consult to assist with ADL evaluation and planning for discharge  - Provide patient education as appropriate  Outcome: Progressing  Goal: Maintain or return mobility status to optimal level  Description: INTERVENTIONS:  - Assess patient's baseline mobility status (ambulation, transfers, stairs, etc )    - Identify cognitive and physical deficits and behaviors that affect mobility  - Identify mobility aids required to assist with transfers and/or ambulation (gait belt, sit-to-stand, lift, walker, cane, etc )  - [place] fall precautions as indicated by assessment  - Record patient progress and toleration of activity level on Mobility SBAR; progress patient to next Phase/Stage  - Instruct patient to call for assistance with activity based on assessment  - Consider rehabilitation consult to assist with strengthening/weightbearing, etc   Outcome: Progressing     Problem: DISCHARGE PLANNING  Goal: Discharge to home or other facility with appropriate resources  Description: INTERVENTIONS:  - Identify barriers to discharge w/patient and caregiver  - Arrange for needed discharge resources and transportation as appropriate  - Identify discharge learning needs (meds, wound care, etc )  - Arrange for interpretive services to assist at discharge as needed  - Refer to Case Management Department for coordinating discharge planning if the patient needs post-hospital services based on physician/advanced practitioner order or complex needs related to functional status, cognitive ability, or social support system  Outcome: Progressing     Problem: Knowledge Deficit  Goal: Patient/family/caregiver demonstrates understanding of disease process, treatment plan, medications, and discharge instructions  Description: Complete learning assessment and assess knowledge base    Interventions:  - Provide teaching at level of understanding  - Provide teaching via preferred learning methods  Outcome: Progressing     Problem: NEUROSENSORY - ADULT  Goal: Achieves stable or improved neurological status  Description: INTERVENTIONS  - Monitor and report changes in neurological status  - Monitor vital signs such as temperature, blood pressure, glucose, and any other labs ordered   - Initiate measures to prevent increased intracranial pressure  - Monitor for seizure activity and implement precautions if appropriate      Outcome: Progressing  Goal: Remains free of injury related to seizures activity  Description: INTERVENTIONS  - Maintain airway, patient safety  and administer oxygen as ordered  - Monitor patient for seizure activity, document and report duration and description of seizure to physician/advanced practitioner  - If seizure occurs,  ensure patient safety during seizure  - Reorient patient post seizure  - Seizure pads on all 4 side rails  - Instruct patient/family to notify RN of any seizure activity including if an aura is experienced  - Instruct patient/family to call for assistance with activity based on nursing assessment  - Administer anti-seizure medications if ordered    Outcome: Progressing  Goal: Achieves maximal functionality and self care  Description: INTERVENTIONS  - Monitor swallowing and airway patency with patient fatigue and changes in neurological status  - Encourage and assist patient to increase activity and self care     - Encourage visually impaired, hearing impaired and aphasic patients to use assistive/communication devices  Outcome: Progressing     Problem: SKIN/TISSUE INTEGRITY - ADULT  Goal: Skin integrity remains intact  Description: INTERVENTIONS  - Identify patients at risk for skin breakdown  - Assess and monitor skin integrity  - Assess and monitor nutrition and hydration status  - Monitor labs (i e  albumin)  - Assess for incontinence   - Turn and reposition patient  - Assist with mobility/ambulation  - Relieve pressure over bony prominences  - Avoid friction and shearing  - Provide appropriate hygiene as needed including keeping skin clean and dry  - Evaluate need for skin moisturizer/barrier cream  - Collaborate with interdisciplinary team (i e  Nutrition, Rehabilitation, etc )   - Patient/family teaching  Outcome: Progressing  Goal: Incision(s), wounds(s) or drain site(s) healing without S/S of infection  Description: INTERVENTIONS  - Assess and document risk factors for skin impairment   - Assess and document dressing, incision, wound bed, drain sites and surrounding tissue  - Consider nutrition services referral as needed  - Oral mucous membranes remain intact  - Provide patient/ family education  Outcome: Progressing  Goal: Oral mucous membranes remain intact  Description: INTERVENTIONS  - Assess oral mucosa and hygiene practices  - Implement preventative oral hygiene regimen  - Implement oral medicated treatments as ordered  - Initiate Nutrition services referral as needed  Outcome: Progressing     Problem: HEMATOLOGIC - ADULT  Goal: Maintains hematologic stability  Description: INTERVENTIONS  - Assess for signs and symptoms of bleeding or hemorrhage  - Monitor labs  - Administer supportive blood products/factors as ordered and appropriate  Outcome: Progressing     Problem: Prexisting or High Potential for Compromised Skin Integrity  Goal: Skin integrity is maintained or improved  Description: INTERVENTIONS:  - Identify patients at risk for skin breakdown  - Assess and monitor skin integrity  - Assess and monitor nutrition and hydration status  - Monitor labs   - Assess for incontinence   - Turn and reposition patient  - Assist with mobility/ambulation  - Relieve pressure over bony prominences  - Avoid friction and shearing  - Provide appropriate hygiene as needed including keeping skin clean and dry  - Evaluate need for skin moisturizer/barrier cream  - Collaborate with interdisciplinary team   - Patient/family teaching  - Consider wound care consult   Outcome: Progressing     Problem: COPING  Goal: Pt/Family able to verbalize concerns and demonstrate effective coping strategies  Description: INTERVENTIONS:  - Assist patient/family to identify coping skills, available support systems and cultural and spiritual values  - Provide emotional support, including active listening and acknowledgement of concerns of patient and caregivers  - Reduce environmental stimuli, as able  - Provide patient education  - Assess for spiritual pain/suffering and initiate spiritual care, including notification of Pastoral Care or afshan based community as needed  - Assess effectiveness of coping strategies  Outcome: Progressing     Problem: DECISION MAKING  Goal: Pt/Family able to effectively weigh alternatives and participate in decision making related to treatment and care  Description: INTERVENTIONS:  - Identify decision maker  - Determine when there are differences among patient's view, family's view, and healthcare provider's view of patient condition and care goals  - Facilitate patient/family articulation of goals for care  - Help patient/family identify pros/cons of alternative solutions  - Provide information as requested by patient/family  - Respect patient/family rights related to privacy and sharing information   - Serve as a liaison between patient, family and health care team  - Initiate consults as appropriate (Ethics Team, Palliative Care, Family Care Conference, etc )  Outcome: Progressing     Problem: BEHAVIOR  Goal: Pt/Family maintain appropriate behavior and adhere to behavioral management agreement, if implemented  Description: INTERVENTIONS:  - Assess the family dynamic   - Encourage verbalization of thoughts and concerns in a socially appropriate manner  - Assess patient/family's coping skills and non-compliant behavior (including use of illegal substances)  - Utilize positive, consistent limit setting strategies supporting safety of patient, staff and others  - Initiate consult with Case Management, Spiritual Care or other ancillary services as appropriate  - If a patient's/visitor's behavior jeopardizes the safety of the patient, staff, or others, refer to organization procedure  - Notify Security of behavior or suspected illegal substances which indicate the need for search of the patient and/or belongings  - Encourage participation in the decision making process about a behavioral management agreement; implement if patient meets criteria  Outcome: Progressing     Problem: Nutrition/Hydration-ADULT  Goal: Nutrient/Hydration intake appropriate for improving, restoring or maintaining nutritional needs  Description: Monitor and assess patient's nutrition/hydration status for malnutrition  Collaborate with interdisciplinary team and initiate plan and interventions as ordered  Monitor patient's weight and dietary intake as ordered or per policy  Utilize nutrition screening tool and intervene as necessary  Determine patient's food preferences and provide high-protein, high-caloric foods as appropriate       INTERVENTIONS:  - Monitor oral intake, urinary output, labs, and treatment plans  - Assess nutrition and hydration status and recommend course of action  - Evaluate amount of meals eaten  - Assist patient with eating if necessary   - Allow adequate time for meals  - Recommend/ encourage appropriate diets, oral nutritional supplements, and vitamin/mineral supplements  - Order, calculate, and assess calorie counts as needed  - Recommend, monitor, and adjust tube feedings and TPN/PPN based on assessed needs  - Assess need for intravenous fluids  - Provide specific nutrition/hydration education as appropriate  - Include patient/family/caregiver in decisions related to nutrition  Outcome: Progressing

## 2020-09-13 NOTE — PROGRESS NOTES
Progress Note - Luciana Burch 1945, 66 y o  male MRN: [id number]    Unit/Bed#: [facility] 603-01 Encounter: [id number]    Primary Care Provider: Celia Aviles MD   Date and time admitted to hospital: 8/28/2020 12:43 PM        [address]  - Suspected unwitnessed fall with unknown loss of consciousness and the below noted injuries  - Fall precautions  - Geriatric Medicine evaluation and recommendations appreciated  - PT and OT evaluation and treatment  - Case Management consultation for disposition planning/expected post acute care facility need  Awaiting placement  Encephalopathy acute  Assessment & Plan  - Acute encephalopathy on admission, likely multifactorial in setting of traumatic brain injury, multiple wounds with rhabdomyolysis and acute kidney injury, sepsis  Now significantly improved  - Blood cultures x2 with 1 of the tubes growing coagulase-negative Staphylococcus and Pantoea agglomerans  Patient was started on Ancef based on sensitivities  Per ID, initial cultures likely a contaminant  Repeat cultures x2 negative at 5 days, patient received 7 days abx, discontinued 9/4/2020   - Inpatient wound care evaluation and recommendations appreciated  - [place] precautions  - Now appears to be at baseline mental status  * TBI (traumatic brain injury) ([facility] [address] )  Assessment & Plan  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 as well as suspected trace subarachnoid hemorrhage in the left parietal region on initial CT scan  On repeat CT scan of head on 8/28/2020 following transferred to [facility], there appeared to be slight increase of the bilateral intraventricular hemorrhage, blossoming of the left parietal subarachnoid hemorrhage, bilateral subdural hematomas and suspected bleeding near the brainstem  - Repeat CT head on 8/30/2020 demonstrated improvement and/or stable hemorrhages    - Appreciate Neurosurgery evaluation and recommendations  No operative intervention recommended  - Repeat CTH 9/4: Interval improvement in the previously seen intracranial hemorrhage  No significant mass effect or midline shift  No new intracranial hemorrhage is seen  - Continue Keppra to complete a 7 day course, ended 9/4/2020   - Patient cleared for chemical DVT prophylaxis with subcutaneous heparin, all other anti-platelet and anticoagulant medications to be held until cleared by Neurosurgery  They are recommending a 4 week f/u with repeat CT head at th at time  - Continue to monitor neurologic exam   - Continue PT and OT evaluation and treatment as indicated  Subarachnoid hemorrhage ([address] )  Assessment & Plan  - Please see outlined management under TBI diagnosis  Subdural hematoma ([facility])  Assessment & Plan  - Please see outlined management under TBI diagnosis  IVH (intraventricular hemorrhage) ([facility] [address] )  Assessment & Plan  - Please see outlined management under TBI diagnosis  Dementia [facility])  Assessment & Plan  - Chronic/baseline history of dementia  - [place]ium precautions  - Geriatric Medicine evaluation and recommendations appreciated  - Continue current medication regimen   - Outpatient follow-up with PCP  Type 2 diabetes mellitus, without long-term current use of insulin [facility])  Assessment & Plan    Lab Results   Component Value Date    HGBA1C 5 7 (H) 08/29/2020     - Documented history of type 2 diabetes mellitus with questionable use of metformin per only available medication list from February 2018  - Currently without hyperglycemia on initial lab workup and most recent hemoglobin A1c from March 2020 was 5 7  - Not on ISS, continue to monitor via routine labwork    Abrasions of multiple sites  Assessment & Plan  - Abrasions to multiple sites including all 4 extremities and his back  - Frequent repositioning, every 2 hours  - Inpatient Wound Care team following    Appreciate their recommendations  - Continue local wound care  Pressure injury of back, stage 1  Assessment & Plan  - Suspected stage I pressure injury of the back  - Inpatient Wound Care service evaluation and recommendations appreciated  - Continue with local wound care to multiple wound sites  Disposition: continue med surg status, placement at [facility] pending      SUBJECTIVE:  Chief Complaint: "I'm good"    Subjective: Patient offers no new complaints and is resting comfortably  Knows he is in [facility]  No issues overnight         OBJECTIVE:     Meds/Allergies     Current Facility-Administered Medications:     acetaminophen (TYLENOL) tablet 650 mg, 650 mg, Oral, Q6H PRN, Renea Velez PA-C, 650 mg at 09/10/20 1202    bacitracin topical ointment 1 large application, 1 large application, Topical, BID, Valente Lane PA-C, 1 large application at 13/85/80 0908    brimonidine (ALPHAGAN P) 0 15 % ophthalmic solution 1 drop, 1 drop, Both Eyes, Q8H [address], Soledad Mcbride PA-C, 1 drop at 09/13/20 0545    chlorhexidine (PERIDEX) 0 12 % oral rinse 15 mL, 15 mL, Swish & Spit, Q12H [address], Renea Velez PA-C, 15 mL at 09/12/20 2109    heparin (porcine) subcutaneous injection 5,000 Units, 5,000 Units, Subcutaneous, Q8H [address], Valente Lane PA-C, 5,000 Units at 09/13/20 0545    lidocaine (LIDODERM) 5 % patch 1 patch, 1 patch, Topical, Daily, Renea Velez PA-C, 1 patch at 09/13/20 0907    melatonin tablet 6 mg, 6 mg, Oral, HS, Nora BEE Phoebe, DO, 6 mg at 09/12/20 2109    ondansetron (ZOFRAN) injection 4 mg, 4 mg, Intravenous, Q6H PRN, Renea Velez PA-C    travoprost (TRAVATAN-Z) 0 004 % ophthalmic solution 1 drop, 1 drop, Both Eyes, HS, Soledad Mcbride PA-C, 1 drop at 09/12/20 2109    ziprasidone (GEODON) capsule 60 mg, 60 mg, Oral, Daily, Valente Lane PA-C, 60 mg at 09/13/20 0907    ziprasidone (GEODON) capsule 80 mg, 80 mg, Oral, QPM, Valente Lane PA-C, 80 mg at 09/12/20 1722     Vitals: Vitals:    09/13/20 0911   BP: 114/94   Pulse: 74   Resp: 16   Temp: (!) 97 4 °F (36 3 °C)   SpO2:        Intake/Output:  I/O       09/11 0701 - 09/12 0700 09/12 0701 - 09/13 0700 09/13 0701 - 09/14 0700    P  O  1020 684 240    Total Intake(mL/kg) 1020 (14 6) 684 (9 8) 240 (3 4)    Urine (mL/kg/hr)  1650 (1)     Stool       Total Output  1650     Net +1020 -966 +240           Unmeasured Urine Occurrence  6 x            Nutrition/GI Proph/Bowel Reg: Regular    Physical Exam:   GENERAL APPEARANCE: NAD  NEURO: GCS 14 (4, 4, 6), non-focal  HEENT: NCAT  CV: RRR, no MGR  LUNGS: CTA bilaterally  GI: soft,non-tender,non-distended  : voiding  MSK: moving all equally  SKIN: pink,w arm,dry ; + RLE abrasions are well healing    Invasive Devices     Drain            External Urinary Catheter Small 7 days                 Lab Results: Results: I have personally reviewed pertinent reports   , BMP/CMP: No results found for: SODIUM, K, CL, CO2, ANIONGAP, BUN, CREATININE, GLUCOSE, CALCIUM, AST, ALT, ALKPHOS, PROT, BILITOT, EGFR, CBC: No results found for: WBC, HGB, HCT, MCV, PLT, ADJUSTEDWBC, MCH, MCHC, RDW, MPV, NRBC and Coagulation: No results found for: PT, INR, APTT  Imaging/EKG Studies: Results: I have personally reviewed pertinent reports      Other Studies: no new  VTE Prophylaxis: Sequential compression device (Venodyne)  and Heparin

## 2020-09-13 NOTE — ASSESSMENT & PLAN NOTE
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 as well as suspected trace subarachnoid hemorrhage in the left parietal region on initial CT scan  On repeat CT scan of head on 8/28/2020 following transferred to [facility], there appeared to be slight increase of the bilateral intraventricular hemorrhage, blossoming of the left parietal subarachnoid hemorrhage, bilateral subdural hematomas and suspected bleeding near the brainstem  - Repeat CT head on 8/30/2020 demonstrated improvement and/or stable hemorrhages  - Appreciate Neurosurgery evaluation and recommendations  No operative intervention recommended  - Repeat CTH 9/4: Interval improvement in the previously seen intracranial hemorrhage  No significant mass effect or midline shift  No new intracranial hemorrhage is seen  - Continue Keppra to complete a 7 day course, ended 9/4/2020   - Patient cleared for chemical DVT prophylaxis with subcutaneous heparin, all other anti-platelet and anticoagulant medications to be held until cleared by Neurosurgery  They are recommending a 4 week f/u with repeat CT head at th at time  - Continue to monitor neurologic exam   - Continue PT and OT evaluation and treatment as indicated

## 2020-09-14 LAB — SARS-COV-2 RNA RESP QL NAA+PROBE: NEGATIVE

## 2020-09-14 PROCEDURE — 99232 SBSQ HOSP IP/OBS MODERATE 35: CPT | Performed by: SURGERY

## 2020-09-14 PROCEDURE — U0003 INFECTIOUS AGENT DETECTION BY NUCLEIC ACID (DNA OR RNA); SEVERE ACUTE RESPIRATORY SYNDROME CORONAVIRUS 2 (SARS-COV-2) (CORONAVIRUS DISEASE [COVID-19]), AMPLIFIED PROBE TECHNIQUE, MAKING USE OF HIGH THROUGHPUT TECHNOLOGIES AS DESCRIBED BY CMS-2020-01-R: HCPCS | Performed by: PHYSICIAN ASSISTANT

## 2020-09-14 RX ADMIN — TRAVOPROST 1 DROP: 0.04 SOLUTION/ DROPS OPHTHALMIC at 21:37

## 2020-09-14 RX ADMIN — LIDOCAINE 5% 1 PATCH: 700 PATCH TOPICAL at 08:35

## 2020-09-14 RX ADMIN — BRIMONIDINE TARTRATE 1 DROP: 1.5 SOLUTION OPHTHALMIC at 13:06

## 2020-09-14 RX ADMIN — MELATONIN 6 MG: at 21:37

## 2020-09-14 RX ADMIN — ZIPRASIDONE HYDROCHLORIDE 60 MG: 20 CAPSULE ORAL at 08:36

## 2020-09-14 RX ADMIN — BACITRACIN ZINC 1 LARGE APPLICATION: 500 OINTMENT TOPICAL at 17:30

## 2020-09-14 RX ADMIN — HEPARIN SODIUM 5000 UNITS: 5000 INJECTION INTRAVENOUS; SUBCUTANEOUS at 21:37

## 2020-09-14 RX ADMIN — ZIPRASIDONE HYDROCHLORIDE 80 MG: 40 CAPSULE ORAL at 17:31

## 2020-09-14 RX ADMIN — ACETAMINOPHEN 650 MG: 325 TABLET, FILM COATED ORAL at 13:00

## 2020-09-14 RX ADMIN — BACITRACIN ZINC 1 LARGE APPLICATION: 500 OINTMENT TOPICAL at 08:35

## 2020-09-14 RX ADMIN — CHLORHEXIDINE GLUCONATE 0.12% ORAL RINSE 15 ML: 1.2 LIQUID ORAL at 08:35

## 2020-09-14 RX ADMIN — BRIMONIDINE TARTRATE 1 DROP: 1.5 SOLUTION OPHTHALMIC at 05:58

## 2020-09-14 RX ADMIN — BRIMONIDINE TARTRATE 1 DROP: 1.5 SOLUTION OPHTHALMIC at 21:37

## 2020-09-14 RX ADMIN — HEPARIN SODIUM 5000 UNITS: 5000 INJECTION INTRAVENOUS; SUBCUTANEOUS at 05:56

## 2020-09-14 RX ADMIN — HEPARIN SODIUM 5000 UNITS: 5000 INJECTION INTRAVENOUS; SUBCUTANEOUS at 13:05

## 2020-09-14 NOTE — PROGRESS NOTES
Progress Note - Cachorro Meyers 1945, 66 y o  male MRN: [id number]    [facility] Encounter: [id number]    Primary Care Provider: Kalin Servin MD   Date and time admitted to hospital: 8/28/2020 12:43 PM        IVH (intraventricular hemorrhage) ([facility] [address] )  Assessment & Plan  - Please see outlined management under TBI diagnosis  Subdural hematoma (HCC)  Assessment & Plan  - Please see outlined management under TBI diagnosis  Subarachnoid hemorrhage ([facility] )  Assessment & Plan  - Please see outlined management under TBI diagnosis  Type 2 diabetes mellitus, without long-term current use of insulin [facility])  Assessment & Plan    Lab Results   Component Value Date    HGBA1C 5 7 (H) 08/29/2020     - Documented history of type 2 diabetes mellitus with questionable use of metformin per only available medication list from February 2018  - Currently without hyperglycemia on initial lab workup and most recent hemoglobin A1c from March 2020 was 5 7  - Not on ISS, continue to monitor via routine labwork    Pressure injury of back, stage 1  Assessment & Plan  - Suspected stage I pressure injury of the back  - Inpatient Wound Care service evaluation and recommendations appreciated  - Continue with local wound care to multiple wound sites  Fall  Assessment & Plan  - Suspected unwitnessed fall with unknown loss of consciousness and the below noted injuries  - Fall precautions  - Geriatric Medicine evaluation and recommendations appreciated  - Case Management consultation for disposition planning/expected post acute care facility need  Awaiting placement  Abrasions of multiple sites  Assessment & Plan  - Abrasions to multiple sites including all 4 extremities and his back  - Frequent repositioning, every 2 hours  - Inpatient Wound Care team following  Appreciate their recommendations  - Continue local wound care      Encephalopathy acute  Assessment & Plan  - Acute encephalopathy on admission, likely multifactorial in setting of traumatic brain injury, multiple wounds with rhabdomyolysis and acute kidney injury, sepsis  Now significantly improved  - Blood cultures x2 with 1 of the tubes growing coagulase-negative Staphylococcus and Pantoea agglomerans  Patient was started on Ancef based on sensitivities  Per ID, initial cultures likely a contaminant  Repeat cultures x2 negative at 5 days, patient received 7 days abx, discontinued 9/4/2020   - Inpatient wound care evaluation and recommendations appreciated  - Delirium precautions  - Now appears to be at baseline mental status  pleasant    Dementia [facility])  Assessment & Plan  - Chronic/baseline history of dementia  - Delirium precautions  - Geriatric Medicine evaluation and recommendations appreciated  - Continue current medication regimen   - Outpatient follow-up with PCP  * TBI (traumatic brain injury) ([facility] [address] )  Assessment & Plan  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 as well as suspected trace subarachnoid hemorrhage in the left parietal region on initial CT scan  On repeat CT scan of head on 8/28/2020 following transferred to [facility], there appeared to be slight increase of the bilateral intraventricular hemorrhage, blossoming of the left parietal subarachnoid hemorrhage, bilateral subdural hematomas and suspected bleeding near the brainstem  - Repeat CT head on 8/30/2020 demonstrated improvement and/or stable hemorrhages  - Appreciate Neurosurgery evaluation and recommendations  No operative intervention recommended  - Repeat CTH 9/4: Interval improvement in the previously seen intracranial hemorrhage  No significant mass effect or midline shift  No new intracranial hemorrhage is seen    - Continue Keppra to complete a 7 day course, ended 9/4/2020   - Patient cleared for chemical DVT prophylaxis with subcutaneous heparin, all other anti-platelet and anticoagulant medications to be held until cleared by Neurosurgery  They are recommending a 4 week f/u with repeat CT head at th at time  - Continue to monitor neurologic exam   - Continue PT and OT evaluation and treatment as indicated            Disposition:  Rehab      SUBJECTIVE:  Chief Complaint:  No complaints    Subjective:  No complaints, pleasant and smiling      OBJECTIVE:     Meds/Allergies     Current Facility-Administered Medications:     acetaminophen (TYLENOL) tablet 650 mg, 650 mg, Oral, Q6H PRN, Renea Velez PA-C, 650 mg at 09/10/20 1202    bacitracin topical ointment 1 large application, 1 large application, Topical, BID, Valente Lane PA-C, 1 large application at 25/58/55 0835    brimonidine (ALPHAGAN P) 0 15 % ophthalmic solution 1 drop, 1 drop, Both Eyes, Q8H [address], Soledad Mcbride PA-C, 1 drop at 09/14/20 0558    chlorhexidine (PERIDEX) 0 12 % oral rinse 15 mL, 15 mL, Swish & Spit, Q12H [address], Cory Koroma PA-C, 15 mL at 09/14/20 0835    heparin (porcine) subcutaneous injection 5,000 Units, 5,000 Units, Subcutaneous, Q8H [address], Valente Lane PA-C, 5,000 Units at 09/14/20 0556    lidocaine (LIDODERM) 5 % patch 1 patch, 1 patch, Topical, Daily, Renea Velez PA-C, 1 patch at 09/14/20 0835    melatonin tablet 6 mg, 6 mg, Oral, HS, Nora Sandhu, DO, 6 mg at 09/13/20 2159    ondansetron (ZOFRAN) injection 4 mg, 4 mg, Intravenous, Q6H PRN, Renea Velez PA-C    travoprost (TRAVATAN-Z) 0 004 % ophthalmic solution 1 drop, 1 drop, Both Eyes, HS, Soledad Mcbride PA-C, 1 drop at 09/13/20 2159    ziprasidone (GEODON) capsule 60 mg, 60 mg, Oral, Daily, Valente Lane PA-C, 60 mg at 09/14/20 0836    ziprasidone (GEODON) capsule 80 mg, 80 mg, Oral, QPM, Valente Lane PA-C, 80 mg at 09/13/20 1727     Vitals:   Vitals:    09/14/20 0743   BP: 128/74   Pulse: 79   Resp: 18   Temp:    SpO2: 98%       Intake/Output:  I/O       09/12 0701 - 09/13 0700 09/13 0701 - 09/14 0700 09/14 0701 - 09/15 0700    P  O  684 462     Total Intake(mL/kg) 684 (9 8) 462 (6 6)     Urine (mL/kg/hr) 1650 (1) 550 (0 3)     Total Output 1650 550     Net -966 -88            Unmeasured Urine Occurrence 6 x             Nutrition/GI Proph/Bowel Reg:  Regular    Physical Exam:   Constitution: patient lying in bed, appears comfortable  HEENT: DUTCH, EOMI  CV: regular rate and rhythm, +2 DP pulses  Pulm: CTA, no wheezes, rhonchi or crackles, unlabored, equal bilaterally  Abd: soft, nontender, nondistended, active bowel sounds  Extremities: moves all extremities, equal strength, no edema, no skin breakdown  Neuro: alert, no focal deficits            Invasive Devices     Drain            External Urinary Catheter Small 8 days                 Lab Results: Results: I have personally reviewed pertinent reports  Imaging/EKG Studies: Results: I have personally reviewed pertinent reports      Other Studies: n/a  VTE Prophylaxis: Heparin

## 2020-09-14 NOTE — PLAN OF CARE
Problem: Potential for Falls  Goal: Patient will remain free of falls  Description: INTERVENTIONS:  - Assess patient frequently for physical needs  -  Identify cognitive and physical deficits and behaviors that affect risk of falls    -  [place] fall precautions as indicated by assessment   - Educate patient/family on patient safety including physical limitations  - Instruct patient to call for assistance with activity based on assessment  - Modify environment to reduce risk of injury  - Consider OT/PT consult to assist with strengthening/mobility  9/14/2020 1254 by Elzbieta Terry RN  Outcome: Progressing  9/14/2020 1244 by Elzbieta Terry RN  Outcome: Progressing     Problem: PAIN - ADULT  Goal: Verbalizes/displays adequate comfort level or baseline comfort level  Description: Interventions:  - Encourage patient to monitor pain and request assistance  - Assess pain using appropriate pain scale  - Administer analgesics based on type and severity of pain and evaluate response  - Implement non-pharmacological measures as appropriate and evaluate response  - Consider cultural and social influences on pain and pain management  - Notify physician/advanced practitioner if interventions unsuccessful or patient reports new pain  9/14/2020 1254 by Elzbieta Terry RN  Outcome: Progressing  9/14/2020 1244 by Elzbieta Terry RN  Outcome: Progressing     Problem: INFECTION - ADULT  Goal: Absence or prevention of progression during hospitalization  Description: INTERVENTIONS:  - Assess and monitor for signs and symptoms of infection  - Monitor lab/diagnostic results  - Monitor all insertion sites, i e  indwelling lines, tubes, and drains  - Monitor endotracheal if appropriate and nasal secretions for changes in amount and color  - [place] appropriate cooling/warming therapies per order  - Administer medications as ordered  - Instruct and encourage patient and family to use good hand hygiene technique  - Identify and instruct in appropriate isolation precautions for identified infection/condition  9/14/2020 1254 by Xavi Huston RN  Outcome: Progressing  9/14/2020 1244 by Xavi Huston RN  Outcome: Progressing     Problem: SAFETY ADULT  Goal: Patient will remain free of falls  Description: INTERVENTIONS:  - Assess patient frequently for physical needs  -  Identify cognitive and physical deficits and behaviors that affect risk of falls    -  [place] fall precautions as indicated by assessment   - Educate patient/family on patient safety including physical limitations  - Instruct patient to call for assistance with activity based on assessment  - Modify environment to reduce risk of injury  - Consider OT/PT consult to assist with strengthening/mobility  9/14/2020 1254 by Xavi Huston RN  Outcome: Progressing  9/14/2020 1244 by Xavi Huston RN  Outcome: Progressing  Goal: Maintain or return to baseline ADL function  Description: INTERVENTIONS:  -  Assess patient's ability to carry out ADLs; assess patient's baseline for ADL function and identify physical deficits which impact ability to perform ADLs (bathing, care of mouth/teeth, toileting, grooming, dressing, etc )  - Assess/evaluate cause of self-care deficits   - Assess range of motion  - Assess patient's mobility; develop plan if impaired  - Assess patient's need for assistive devices and provide as appropriate  - Encourage maximum independence but intervene and supervise when necessary  - Involve family in performance of ADLs  - Assess for home care needs following discharge   - Consider OT consult to assist with ADL evaluation and planning for discharge  - Provide patient education as appropriate  9/14/2020 1254 by Xavi Huston RN  Outcome: Progressing  9/14/2020 1244 by Xavi Huston RN  Outcome: Progressing  Goal: Maintain or return mobility status to optimal level  Description: INTERVENTIONS:  - Assess patient's baseline mobility status (ambulation, transfers, stairs, etc )    - Identify cognitive and physical deficits and behaviors that affect mobility  - Identify mobility aids required to assist with transfers and/or ambulation (gait belt, sit-to-stand, lift, walker, cane, etc )  - [place] fall precautions as indicated by assessment  - Record patient progress and toleration of activity level on Mobility SBAR; progress patient to next Phase/Stage  - Instruct patient to call for assistance with activity based on assessment  - Consider rehabilitation consult to assist with strengthening/weightbearing, etc   9/14/2020 1254 by Gaviota Corona RN  Outcome: Progressing  9/14/2020 1244 by Gaviota Corona RN  Outcome: Progressing     Problem: DISCHARGE PLANNING  Goal: Discharge to home or other facility with appropriate resources  Description: INTERVENTIONS:  - Identify barriers to discharge w/patient and caregiver  - Arrange for needed discharge resources and transportation as appropriate  - Identify discharge learning needs (meds, wound care, etc )  - Arrange for interpretive services to assist at discharge as needed  - Refer to Case Management Department for coordinating discharge planning if the patient needs post-hospital services based on physician/advanced practitioner order or complex needs related to functional status, cognitive ability, or social support system  9/14/2020 1254 by Gaviota Corona RN  Outcome: Progressing  9/14/2020 1244 by Gaviota Corona RN  Outcome: Progressing     Problem: Knowledge Deficit  Goal: Patient/family/caregiver demonstrates understanding of disease process, treatment plan, medications, and discharge instructions  Description: Complete learning assessment and assess knowledge base    Interventions:  - Provide teaching at level of understanding  - Provide teaching via preferred learning methods  9/14/2020 1254 by Gaviota Corona RN  Outcome: Progressing  9/14/2020 1244 by Gaviota Corona RN  Outcome: Progressing Problem: NEUROSENSORY - ADULT  Goal: Achieves stable or improved neurological status  Description: INTERVENTIONS  - Monitor and report changes in neurological status  - Monitor vital signs such as temperature, blood pressure, glucose, and any other labs ordered   - Initiate measures to prevent increased intracranial pressure  - Monitor for seizure activity and implement precautions if appropriate      9/14/2020 1254 by Isa Kimble RN  Outcome: Progressing  9/14/2020 1244 by Isa Kimble RN  Outcome: Progressing  Goal: Remains free of injury related to seizures activity  Description: INTERVENTIONS  - Maintain airway, patient safety  and administer oxygen as ordered  - Monitor patient for seizure activity, document and report duration and description of seizure to physician/advanced practitioner  - If seizure occurs,  ensure patient safety during seizure  - Reorient patient post seizure  - Seizure pads on all 4 side rails  - Instruct patient/family to notify RN of any seizure activity including if an aura is experienced  - Instruct patient/family to call for assistance with activity based on nursing assessment  - Administer anti-seizure medications if ordered    9/14/2020 1254 by Isa Kimble RN  Outcome: Progressing  9/14/2020 1244 by Isa Kimble RN  Outcome: Progressing  Goal: Achieves maximal functionality and self care  Description: INTERVENTIONS  - Monitor swallowing and airway patency with patient fatigue and changes in neurological status  - Encourage and assist patient to increase activity and self care     - Encourage visually impaired, hearing impaired and aphasic patients to use assistive/communication devices  9/14/2020 1254 by Isa Kimble RN  Outcome: Progressing  9/14/2020 1244 by Isa Kimble RN  Outcome: Progressing     Problem: SKIN/TISSUE INTEGRITY - ADULT  Goal: Skin integrity remains intact  Description: INTERVENTIONS  - Identify patients at risk for skin breakdown  - Assess and monitor skin integrity  - Assess and monitor nutrition and hydration status  - Monitor labs (i e  albumin)  - Assess for incontinence   - Turn and reposition patient  - Assist with mobility/ambulation  - Relieve pressure over bony prominences  - Avoid friction and shearing  - Provide appropriate hygiene as needed including keeping skin clean and dry  - Evaluate need for skin moisturizer/barrier cream  - Collaborate with interdisciplinary team (i e  Nutrition, Rehabilitation, etc )   - Patient/family teaching  9/14/2020 1254 by Maya Russell RN  Outcome: Progressing  9/14/2020 1244 by Maya Russell RN  Outcome: Progressing  Goal: Incision(s), wounds(s) or drain site(s) healing without S/S of infection  Description: INTERVENTIONS  - Assess and document risk factors for skin impairment   - Assess and document dressing, incision, wound bed, drain sites and surrounding tissue  - Consider nutrition services referral as needed  - Oral mucous membranes remain intact  - Provide patient/ family education  9/14/2020 1254 by Maya Russell RN  Outcome: Progressing  9/14/2020 1244 by Maya Russell RN  Outcome: Progressing  Goal: Oral mucous membranes remain intact  Description: INTERVENTIONS  - Assess oral mucosa and hygiene practices  - Implement preventative oral hygiene regimen  - Implement oral medicated treatments as ordered  - Initiate Nutrition services referral as needed  9/14/2020 1254 by Maya Russell RN  Outcome: Progressing  9/14/2020 1244 by Maya Russell RN  Outcome: Progressing     Problem: HEMATOLOGIC - ADULT  Goal: Maintains hematologic stability  Description: INTERVENTIONS  - Assess for signs and symptoms of bleeding or hemorrhage  - Monitor labs  - Administer supportive blood products/factors as ordered and appropriate  9/14/2020 1254 by Maya Russell RN  Outcome: Progressing  9/14/2020 [address] [place] [address] by Maya Russell RN  Outcome: Progressing     Problem: Prexisting or High Potential for Compromised Skin Integrity  Goal: Skin integrity is maintained or improved  Description: INTERVENTIONS:  - Identify patients at risk for skin breakdown  - Assess and monitor skin integrity  - Assess and monitor nutrition and hydration status  - Monitor labs   - Assess for incontinence   - Turn and reposition patient  - Assist with mobility/ambulation  - Relieve pressure over bony prominences  - Avoid friction and shearing  - Provide appropriate hygiene as needed including keeping skin clean and dry  - Evaluate need for skin moisturizer/barrier cream  - Collaborate with interdisciplinary team   - Patient/family teaching  - Consider wound care consult   9/14/2020 1254 by Magdiel Lott RN  Outcome: Progressing  9/14/2020 1244 by Magdiel Lott RN  Outcome: Progressing     Problem: COPING  Goal: Pt/Family able to verbalize concerns and demonstrate effective coping strategies  Description: INTERVENTIONS:  - Assist patient/family to identify coping skills, available support systems and cultural and spiritual values  - Provide emotional support, including active listening and acknowledgement of concerns of patient and caregivers  - Reduce environmental stimuli, as able  - Provide patient education  - Assess for spiritual pain/suffering and initiate spiritual care, including notification of Pastoral Care or afshan based community as needed  - Assess effectiveness of coping strategies  9/14/2020 1254 by Magdiel Lott RN  Outcome: Progressing  9/14/2020 1244 by Magdiel Lott RN  Outcome: Progressing     Problem: DECISION MAKING  Goal: Pt/Family able to effectively weigh alternatives and participate in decision making related to treatment and care  Description: INTERVENTIONS:  - Identify decision maker  - Determine when there are differences among patient's view, family's view, and healthcare provider's view of patient condition and care goals  - Facilitate patient/family articulation of goals for care  - Help patient/family identify pros/cons of alternative solutions  - Provide information as requested by patient/family  - Respect patient/family rights related to privacy and sharing information   - Serve as a liaison between patient, family and health care team  - Initiate consults as appropriate (Ethics Team, Palliative Care, [facility], etc )  9/14/2020 1254 by Leonardo Powell RN  Outcome: Progressing  9/14/2020 1244 by Leonardo Powell RN  Outcome: Progressing     Problem: BEHAVIOR  Goal: Pt/Family maintain appropriate behavior and adhere to behavioral management agreement, if implemented  Description: INTERVENTIONS:  - Assess the family dynamic   - Encourage verbalization of thoughts and concerns in a socially appropriate manner  - Assess patient/family's coping skills and non-compliant behavior (including use of illegal substances)  - Utilize positive, consistent limit setting strategies supporting safety of patient, staff and others  - Initiate consult with Case Management, Spiritual Care or other ancillary services as appropriate  - If a patient's/visitor's behavior jeopardizes the safety of the patient, staff, or others, refer to organization procedure  - Notify Security of behavior or suspected illegal substances which indicate the need for search of the patient and/or belongings  - Encourage participation in the decision making process about a behavioral management agreement; implement if patient meets criteria  9/14/2020 1254 by Leonardo Powell RN  Outcome: Progressing  9/14/2020 1244 by Leonardo Powell RN  Outcome: Progressing     Problem: Nutrition/Hydration-ADULT  Goal: Nutrient/Hydration intake appropriate for improving, restoring or maintaining nutritional needs  Description: Monitor and assess patient's nutrition/hydration status for malnutrition  Collaborate with interdisciplinary team and initiate plan and interventions as ordered    Monitor patient's weight and dietary intake as ordered or per policy  Utilize nutrition screening tool and intervene as necessary  Determine patient's food preferences and provide high-protein, high-caloric foods as appropriate       INTERVENTIONS:  - Monitor oral intake, urinary output, labs, and treatment plans  - Assess nutrition and hydration status and recommend course of action  - Evaluate amount of meals eaten  - Assist patient with eating if necessary   - Allow adequate time for meals  - Recommend/ encourage appropriate diets, oral nutritional supplements, and vitamin/mineral supplements  - Order, calculate, and assess calorie counts as needed  - Recommend, monitor, and adjust tube feedings and TPN/PPN based on assessed needs  - Assess need for intravenous fluids  - Provide specific nutrition/hydration education as appropriate  - Include patient/family/caregiver in decisions related to nutrition  9/14/2020 1254 by Dia Rao RN  Outcome: Progressing  9/14/2020 1244 by Dia Rao RN  Outcome: Progressing

## 2020-09-14 NOTE — ASSESSMENT & PLAN NOTE
- Acute encephalopathy on admission, likely multifactorial in setting of traumatic brain injury, multiple wounds with rhabdomyolysis and acute kidney injury, sepsis  Now significantly improved  - Blood cultures x2 with 1 of the tubes growing coagulase-negative Staphylococcus and Pantoea agglomerans  Patient was started on Ancef based on sensitivities  Per ID, initial cultures likely a contaminant  Repeat cultures x2 negative at 5 days, patient received 7 days abx, discontinued 9/4/2020   - Inpatient wound care evaluation and recommendations appreciated  - Delirium precautions  - Now appears to be at baseline mental status   pleasant

## 2020-09-14 NOTE — SOCIAL WORK
Pt approved for [facility]  They can accept tomorrow  CM informed pt's friend Amandeep Gambino via voicemail  Pt will d/c tomorrow @[id number] via [place]

## 2020-09-14 NOTE — ASSESSMENT & PLAN NOTE
- Suspected unwitnessed fall with unknown loss of consciousness and the below noted injuries  - Fall precautions  - Geriatric Medicine evaluation and recommendations appreciated  - Case Management consultation for disposition planning/expected post acute care facility need  Awaiting placement

## 2020-09-14 NOTE — ASSESSMENT & PLAN NOTE
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 as well as suspected trace subarachnoid hemorrhage in the left parietal region on initial CT scan  On repeat CT scan of head on 8/28/2020 following transferred to [facility], there appeared to be slight increase of the bilateral intraventricular hemorrhage, blossoming of the left parietal subarachnoid hemorrhage, bilateral subdural hematomas and suspected bleeding near the brainstem  - Repeat CT head on 8/30/2020 demonstrated improvement and/or stable hemorrhages  - Appreciate Neurosurgery evaluation and recommendations  No operative intervention recommended  - Repeat CTH 9/4: Interval improvement in the previously seen intracranial hemorrhage  No significant mass effect or midline shift  No new intracranial hemorrhage is seen  - Continue Keppra to complete a 7 day course, ended 9/4/2020   - Patient cleared for chemical DVT prophylaxis with subcutaneous heparin, all other anti-platelet and anticoagulant medications to be held until cleared by Neurosurgery  They are recommending a 4 week f/u with repeat CT head at th at time  - Continue to monitor neurologic exam   - Continue PT and OT evaluation and treatment as indicated

## 2020-09-15 VITALS
DIASTOLIC BLOOD PRESSURE: 66 MMHG | RESPIRATION RATE: 17 BRPM | TEMPERATURE: 97.8 F | SYSTOLIC BLOOD PRESSURE: 118 MMHG | WEIGHT: 152.13 LBS | HEIGHT: 67 IN | OXYGEN SATURATION: 99 % | HEART RATE: 78 BPM | BODY MASS INDEX: 23.88 KG/M2

## 2020-09-15 PROCEDURE — 99238 HOSP IP/OBS DSCHRG MGMT 30/<: CPT | Performed by: SURGERY

## 2020-09-15 RX ORDER — LANOLIN ALCOHOL/MO/W.PET/CERES
6 CREAM (GRAM) TOPICAL
Refills: 0
Start: 2020-09-15

## 2020-09-15 RX ORDER — ZIPRASIDONE HYDROCHLORIDE 60 MG/1
60 CAPSULE ORAL DAILY
Refills: 0
Start: 2020-09-16

## 2020-09-15 RX ORDER — TRAVOPROST OPHTHALMIC SOLUTION 0.04 MG/ML
1 SOLUTION OPHTHALMIC
Refills: 0
Start: 2020-09-15

## 2020-09-15 RX ORDER — BRIMONIDINE TARTRATE 0.15 %
1 DROPS OPHTHALMIC (EYE) EVERY 8 HOURS SCHEDULED
Refills: 0
Start: 2020-09-15

## 2020-09-15 RX ORDER — ZIPRASIDONE HYDROCHLORIDE 80 MG/1
80 CAPSULE ORAL EVERY EVENING
Refills: 0
Start: 2020-09-15

## 2020-09-15 RX ORDER — LIDOCAINE 50 MG/G
1 PATCH TOPICAL DAILY
Refills: 0
Start: 2020-09-16

## 2020-09-15 RX ORDER — ACETAMINOPHEN 325 MG/1
650 TABLET ORAL EVERY 6 HOURS PRN
Qty: 30 TABLET | Refills: 0
Start: 2020-09-15

## 2020-09-15 RX ADMIN — BACITRACIN ZINC 1 LARGE APPLICATION: 500 OINTMENT TOPICAL at 08:00

## 2020-09-15 RX ADMIN — BRIMONIDINE TARTRATE 1 DROP: 1.5 SOLUTION OPHTHALMIC at 05:16

## 2020-09-15 RX ADMIN — CHLORHEXIDINE GLUCONATE 0.12% ORAL RINSE 15 ML: 1.2 LIQUID ORAL at 08:00

## 2020-09-15 RX ADMIN — ZIPRASIDONE HYDROCHLORIDE 60 MG: 20 CAPSULE ORAL at 08:00

## 2020-09-15 RX ADMIN — ACETAMINOPHEN 650 MG: 325 TABLET, FILM COATED ORAL at 09:24

## 2020-09-15 RX ADMIN — LIDOCAINE 5% 1 PATCH: 700 PATCH TOPICAL at 08:00

## 2020-09-15 RX ADMIN — HEPARIN SODIUM 5000 UNITS: 5000 INJECTION INTRAVENOUS; SUBCUTANEOUS at 05:16

## 2020-09-15 NOTE — ASSESSMENT & PLAN NOTE
Lab Results   Component Value Date    HGBA1C 5 7 (H) 08/29/2020     - Documented history of type 2 diabetes mellitus with questionable use of metformin per only available medication list from February 2018    - Currently without hyperglycemia on initial lab workup and most recent hemoglobin A1c from March 2020 was 5 7

## 2020-09-15 NOTE — PLAN OF CARE
Problem: Potential for Falls  Goal: Patient will remain free of falls  Description: INTERVENTIONS:  - Assess patient frequently for physical needs  -  Identify cognitive and physical deficits and behaviors that affect risk of falls    -  [place] fall precautions as indicated by assessment   - Educate patient/family on patient safety including physical limitations  - Instruct patient to call for assistance with activity based on assessment  - Modify environment to reduce risk of injury  - Consider OT/PT consult to assist with strengthening/mobility  Outcome: Progressing     Problem: PAIN - ADULT  Goal: Verbalizes/displays adequate comfort level or baseline comfort level  Description: Interventions:  - Encourage patient to monitor pain and request assistance  - Assess pain using appropriate pain scale  - Administer analgesics based on type and severity of pain and evaluate response  - Implement non-pharmacological measures as appropriate and evaluate response  - Consider cultural and social influences on pain and pain management  - Notify physician/advanced practitioner if interventions unsuccessful or patient reports new pain  Outcome: Progressing     Problem: INFECTION - ADULT  Goal: Absence or prevention of progression during hospitalization  Description: INTERVENTIONS:  - Assess and monitor for signs and symptoms of infection  - Monitor lab/diagnostic results  - Monitor all insertion sites, i e  indwelling lines, tubes, and drains  - Monitor endotracheal if appropriate and nasal secretions for changes in amount and color  - [place] appropriate cooling/warming therapies per order  - Administer medications as ordered  - Instruct and encourage patient and family to use good hand hygiene technique  - Identify and instruct in appropriate isolation precautions for identified infection/condition  Outcome: Progressing     Problem: SAFETY ADULT  Goal: Patient will remain free of falls  Description: INTERVENTIONS:  - Assess patient frequently for physical needs  -  Identify cognitive and physical deficits and behaviors that affect risk of falls    -  [place] fall precautions as indicated by assessment   - Educate patient/family on patient safety including physical limitations  - Instruct patient to call for assistance with activity based on assessment  - Modify environment to reduce risk of injury  - Consider OT/PT consult to assist with strengthening/mobility  Outcome: Progressing  Goal: Maintain or return to baseline ADL function  Description: INTERVENTIONS:  -  Assess patient's ability to carry out ADLs; assess patient's baseline for ADL function and identify physical deficits which impact ability to perform ADLs (bathing, care of mouth/teeth, toileting, grooming, dressing, etc )  - Assess/evaluate cause of self-care deficits   - Assess range of motion  - Assess patient's mobility; develop plan if impaired  - Assess patient's need for assistive devices and provide as appropriate  - Encourage maximum independence but intervene and supervise when necessary  - Involve family in performance of ADLs  - Assess for home care needs following discharge   - Consider OT consult to assist with ADL evaluation and planning for discharge  - Provide patient education as appropriate  Outcome: Progressing  Goal: Maintain or return mobility status to optimal level  Description: INTERVENTIONS:  - Assess patient's baseline mobility status (ambulation, transfers, stairs, etc )    - Identify cognitive and physical deficits and behaviors that affect mobility  - Identify mobility aids required to assist with transfers and/or ambulation (gait belt, sit-to-stand, lift, walker, cane, etc )  - [place] fall precautions as indicated by assessment  - Record patient progress and toleration of activity level on Mobility SBAR; progress patient to next Phase/Stage  - Instruct patient to call for assistance with activity based on assessment  - Consider rehabilitation consult to assist with strengthening/weightbearing, etc   Outcome: Progressing     Problem: DISCHARGE PLANNING  Goal: Discharge to home or other facility with appropriate resources  Description: INTERVENTIONS:  - Identify barriers to discharge w/patient and caregiver  - Arrange for needed discharge resources and transportation as appropriate  - Identify discharge learning needs (meds, wound care, etc )  - Arrange for interpretive services to assist at discharge as needed  - Refer to Case Management Department for coordinating discharge planning if the patient needs post-hospital services based on physician/advanced practitioner order or complex needs related to functional status, cognitive ability, or social support system  Outcome: Progressing     Problem: Knowledge Deficit  Goal: Patient/family/caregiver demonstrates understanding of disease process, treatment plan, medications, and discharge instructions  Description: Complete learning assessment and assess knowledge base    Interventions:  - Provide teaching at level of understanding  - Provide teaching via preferred learning methods  Outcome: Progressing     Problem: NEUROSENSORY - ADULT  Goal: Achieves stable or improved neurological status  Description: INTERVENTIONS  - Monitor and report changes in neurological status  - Monitor vital signs such as temperature, blood pressure, glucose, and any other labs ordered   - Initiate measures to prevent increased intracranial pressure  - Monitor for seizure activity and implement precautions if appropriate      Outcome: Progressing  Goal: Remains free of injury related to seizures activity  Description: INTERVENTIONS  - Maintain airway, patient safety  and administer oxygen as ordered  - Monitor patient for seizure activity, document and report duration and description of seizure to physician/advanced practitioner  - If seizure occurs,  ensure patient safety during seizure  - Reorient patient post seizure  - Seizure pads on all 4 side rails  - Instruct patient/family to notify RN of any seizure activity including if an aura is experienced  - Instruct patient/family to call for assistance with activity based on nursing assessment  - Administer anti-seizure medications if ordered    Outcome: Progressing  Goal: Achieves maximal functionality and self care  Description: INTERVENTIONS  - Monitor swallowing and airway patency with patient fatigue and changes in neurological status  - Encourage and assist patient to increase activity and self care     - Encourage visually impaired, hearing impaired and aphasic patients to use assistive/communication devices  Outcome: Progressing     Problem: SKIN/TISSUE INTEGRITY - ADULT  Goal: Skin integrity remains intact  Description: INTERVENTIONS  - Identify patients at risk for skin breakdown  - Assess and monitor skin integrity  - Assess and monitor nutrition and hydration status  - Monitor labs (i e  albumin)  - Assess for incontinence   - Turn and reposition patient  - Assist with mobility/ambulation  - Relieve pressure over bony prominences  - Avoid friction and shearing  - Provide appropriate hygiene as needed including keeping skin clean and dry  - Evaluate need for skin moisturizer/barrier cream  - Collaborate with interdisciplinary team (i e  Nutrition, Rehabilitation, etc )   - Patient/family teaching  Outcome: Progressing  Goal: Incision(s), wounds(s) or drain site(s) healing without S/S of infection  Description: INTERVENTIONS  - Assess and document risk factors for skin impairment   - Assess and document dressing, incision, wound bed, drain sites and surrounding tissue  - Consider nutrition services referral as needed  - Oral mucous membranes remain intact  - Provide patient/ family education  Outcome: Progressing  Goal: Oral mucous membranes remain intact  Description: INTERVENTIONS  - Assess oral mucosa and hygiene practices  - Implement preventative oral hygiene regimen  - Implement oral medicated treatments as ordered  - Initiate Nutrition services referral as needed  Outcome: Progressing     Problem: HEMATOLOGIC - ADULT  Goal: Maintains hematologic stability  Description: INTERVENTIONS  - Assess for signs and symptoms of bleeding or hemorrhage  - Monitor labs  - Administer supportive blood products/factors as ordered and appropriate  Outcome: Progressing     Problem: Prexisting or High Potential for Compromised Skin Integrity  Goal: Skin integrity is maintained or improved  Description: INTERVENTIONS:  - Identify patients at risk for skin breakdown  - Assess and monitor skin integrity  - Assess and monitor nutrition and hydration status  - Monitor labs   - Assess for incontinence   - Turn and reposition patient  - Assist with mobility/ambulation  - Relieve pressure over bony prominences  - Avoid friction and shearing  - Provide appropriate hygiene as needed including keeping skin clean and dry  - Evaluate need for skin moisturizer/barrier cream  - Collaborate with interdisciplinary team   - Patient/family teaching  - Consider wound care consult   Outcome: Progressing     Problem: COPING  Goal: Pt/Family able to verbalize concerns and demonstrate effective coping strategies  Description: INTERVENTIONS:  - Assist patient/family to identify coping skills, available support systems and cultural and spiritual values  - Provide emotional support, including active listening and acknowledgement of concerns of patient and caregivers  - Reduce environmental stimuli, as able  - Provide patient education  - Assess for spiritual pain/suffering and initiate spiritual care, including notification of Pastoral Care or afshan based community as needed  - Assess effectiveness of coping strategies  Outcome: Progressing     Problem: DECISION MAKING  Goal: Pt/Family able to effectively weigh alternatives and participate in decision making related to treatment and care  Description: INTERVENTIONS:  - Identify decision maker  - Determine when there are differences among patient's view, family's view, and healthcare provider's view of patient condition and care goals  - Facilitate patient/family articulation of goals for care  - Help patient/family identify pros/cons of alternative solutions  - Provide information as requested by patient/family  - Respect patient/family rights related to privacy and sharing information   - Serve as a liaison between patient, family and health care team  - Initiate consults as appropriate (Ethics Team, Palliative Care, Family Care Conference, etc )  Outcome: Progressing     Problem: BEHAVIOR  Goal: Pt/Family maintain appropriate behavior and adhere to behavioral management agreement, if implemented  Description: INTERVENTIONS:  - Assess the family dynamic   - Encourage verbalization of thoughts and concerns in a socially appropriate manner  - Assess patient/family's coping skills and non-compliant behavior (including use of illegal substances)  - Utilize positive, consistent limit setting strategies supporting safety of patient, staff and others  - Initiate consult with Case Management, Spiritual Care or other ancillary services as appropriate  - If a patient's/visitor's behavior jeopardizes the safety of the patient, staff, or others, refer to organization procedure  - Notify Security of behavior or suspected illegal substances which indicate the need for search of the patient and/or belongings  - Encourage participation in the decision making process about a behavioral management agreement; implement if patient meets criteria  Outcome: Progressing     Problem: Nutrition/Hydration-ADULT  Goal: Nutrient/Hydration intake appropriate for improving, restoring or maintaining nutritional needs  Description: Monitor and assess patient's nutrition/hydration status for malnutrition  Collaborate with interdisciplinary team and initiate plan and interventions as ordered  Monitor patient's weight and dietary intake as ordered or per policy  Utilize nutrition screening tool and intervene as necessary  Determine patient's food preferences and provide high-protein, high-caloric foods as appropriate       INTERVENTIONS:  - Monitor oral intake, urinary output, labs, and treatment plans  - Assess nutrition and hydration status and recommend course of action  - Evaluate amount of meals eaten  - Assist patient with eating if necessary   - Allow adequate time for meals  - Recommend/ encourage appropriate diets, oral nutritional supplements, and vitamin/mineral supplements  - Order, calculate, and assess calorie counts as needed  - Recommend, monitor, and adjust tube feedings and TPN/PPN based on assessed needs  - Assess need for intravenous fluids  - Provide specific nutrition/hydration education as appropriate  - Include patient/family/caregiver in decisions related to nutrition  Outcome: Progressing

## 2020-09-15 NOTE — ASSESSMENT & PLAN NOTE
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 as well as suspected trace subarachnoid hemorrhage in the left parietal region on initial CT scan  On repeat CT scan of head on 8/28/2020 following transferred to [facility], there appeared to be slight increase of the bilateral intraventricular hemorrhage, blossoming of the left parietal subarachnoid hemorrhage, bilateral subdural hematomas and suspected bleeding near the brainstem  - Repeat CT head on 8/30/2020 demonstrated improvement and/or stable hemorrhages  - Appreciate Neurosurgery evaluation and recommendations  No operative intervention recommended  - Repeat CTH 9/4: Interval improvement in the previously seen intracranial hemorrhage  No significant mass effect or midline shift  No new intracranial hemorrhage is seen  - Continue Keppra to complete a 7 day course, ended 9/4/2020   - Patient cleared for chemical DVT prophylaxis with subcutaneous heparin, all other anti-platelet and anticoagulant medications to be held until cleared by Neurosurgery  They are recommending a 4 week f/u with repeat CT head at th at time

## 2020-09-15 NOTE — INCIDENTAL FINDINGS
The following findings require follow up:  Radiographic finding   Findin  Small right-sided disc protrusion L4-5  2  Nonobstructive upper pole left renal calculus  3  Indeterminate left renal hypodensities  4  Enlarged prostate  5  Left inguinal hernia containing fat and a portion of sigmoid colon     Follow up required: Yes   Follow up should be done within 3 week(s)    Please notify the following clinician to assist with the follow up:  PCP

## 2020-09-15 NOTE — ASSESSMENT & PLAN NOTE
- Acute encephalopathy on admission, likely multifactorial in setting of traumatic brain injury, multiple wounds with rhabdomyolysis and acute kidney injury, sepsis  Now significantly improved  - Blood cultures x2 with 1 of the tubes growing coagulase-negative Staphylococcus and Pantoea agglomerans  Patient was started on Ancef based on sensitivities  Per ID, initial cultures likely a contaminant  Repeat cultures x2 negative at 5 days, patient received 7 days abx, discontinued 9/4/2020   - Inpatient wound care evaluation and recommendations appreciated  - [place] precautions    - Now appears to be at baseline mental status, pleasant

## 2020-09-15 NOTE — DISCHARGE SUMMARY
Discharge- Luciana Burch 1945, 66 y o  male MRN: [id number]    Unit/Bed#: [facility] 603-01 Encounter: [id number]    Primary Care Provider: Celia Aviles MD   Date and time admitted to hospital: 8/28/2020 12:43 PM        IVH (intraventricular hemorrhage) [facility])  Assessment & Plan  - Please see outlined management under TBI diagnosis  Subdural hematoma (HCC)  Assessment & Plan  - Please see outlined management under TBI diagnosis  Subarachnoid hemorrhage ([facility] [address] )  Assessment & Plan  - Please see outlined management under TBI diagnosis  Type 2 diabetes mellitus, without long-term current use of insulin [facility])  Assessment & Plan    Lab Results   Component Value Date    HGBA1C 5 7 (H) 08/29/2020     - Documented history of type 2 diabetes mellitus with questionable use of metformin per only available medication list from February 2018  - Currently without hyperglycemia on initial lab workup and most recent hemoglobin A1c from March 2020 was 5 7      Pressure injury of back, stage 1  Assessment & Plan  - Suspected stage I pressure injury of the back  - Inpatient Wound Care service evaluation and recommendations appreciated  - Continue with local wound care to multiple wound sites  Fall  Assessment & Plan  - Suspected unwitnessed fall with unknown loss of consciousness and the below noted injuries  - Fall precautions  - Geriatric Medicine evaluation and recommendations appreciated  - discharge to rehab today    Abrasions of multiple sites  [place] to multiple sites including all 4 extremities and his back  - Frequent repositioning, every 2 hours  - Inpatient Wound Care team following  Appreciate their recommendations  - Continue local wound care  Encephalopathy acute  Assessment & Plan  - Acute encephalopathy on admission, likely multifactorial in setting of traumatic brain injury, multiple wounds with rhabdomyolysis and acute kidney injury, sepsis    Now significantly improved  - Blood cultures x2 with 1 of the tubes growing coagulase-negative Staphylococcus and Pantoea agglomerans  Patient was started on Ancef based on sensitivities  Per ID, initial cultures likely a contaminant  Repeat cultures x2 negative at 5 days, patient received 7 days abx, discontinued 9/4/2020   - Inpatient wound care evaluation and recommendations appreciated  - Delirium precautions  - Now appears to be at baseline mental status, pleasant    Dementia [facility])  Assessment & Plan  - Chronic/baseline history of dementia  - Delirium precautions  - Geriatric Medicine evaluation and recommendations appreciated  - Continue current medication regimen   - Outpatient follow-up with PCP  * TBI (traumatic brain injury) ([facility] [address] )  Assessment & Plan  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 as well as suspected trace subarachnoid hemorrhage in the left parietal region on initial CT scan  On repeat CT scan of head on 8/28/2020 following transferred to [facility], there appeared to be slight increase of the bilateral intraventricular hemorrhage, blossoming of the left parietal subarachnoid hemorrhage, bilateral subdural hematomas and suspected bleeding near the brainstem  - Repeat CT head on 8/30/2020 demonstrated improvement and/or stable hemorrhages  - Appreciate Neurosurgery evaluation and recommendations  No operative intervention recommended  - Repeat CTH 9/4: Interval improvement in the previously seen intracranial hemorrhage  No significant mass effect or midline shift  No new intracranial hemorrhage is seen  - Continue Keppra to complete a 7 day course, ended 9/4/2020   - Patient cleared for chemical DVT prophylaxis with subcutaneous heparin, all other anti-platelet and anticoagulant medications to be held until cleared by Neurosurgery   They are recommending a 4 week f/u with repeat CT head at th at time  Constitution: patient lying in bed, appears comfortable  HEENT: DUTCH, EOMI  CV: regular rate and rhythm, +2 DP pulses  Pulm: CTA, no wheezes, rhonchi or crackles, unlabored, equal bilaterally  Abd: soft, nontender, nondistended, active bowel sounds  Extremities: moves all extremities, equal strength, no edema, no skin breakdown  Neuro: A&O, no focal deficits  Skin: no rash or breakdown, warm          Resolved Problems  Date Reviewed: 9/15/2020          Resolved    Traumatic rhabdomyolysis ([facility] [address] ) 9/8/2020     Resolved by  Chidi Peralta PA-C    NAVID (acute kidney injury) ([facility] [address] ) 9/3/2020     Resolved by  Chidi Peralta PA-C    Blood culture positive for microorganism 9/9/2020     Resolved by  Susie Melo DO          Admission Date:   Admission Orders (From admission, onward)     Ordered        08/28/20 1517  Inpatient Admission  Once                     Admitting Diagnosis: Traumatic brain injury with loss of consciousness, initial encounter [facility]) [[id number]]    HPI: Per Chidi Peralta PA-C, "Lakisha Christian  is a 66 y o  male who presents as a transfer from [facility] with a traumatic brain injury, multiple wounds on his extremities and back, rhabdomyolysis and acute kidney injury as well as encephalopathy  Patient unable to provide any meaningful history and only repeats that he is feeling pretty good  Minimal history available from medical records as the [facility] and has no Care everywhere chart  Records from [facility] also are limited with no known patient emergency contact information  Patient's primary care provider office is far also closed on Fridays with no on-call provider listed to provide additional history "    Procedures Performed: No orders of the defined types were placed in this encounter        Summary of Hospital Course:     Patient was admitted to the ICU and started on IV fluids as well as IV antibiotics  Blood cultures x1 were positive  Neurosurgery was consulted but no operative intervention was necessary  Patient was placed on Keppra for 1 week  Patient worked with physical and occupational therapy and was recommended for rehab  Infectious Disease was also consulted during his stay for antibiotic management given the positive blood culture  Significant Findings, Care, Treatment and Services Provided:     Cta Head And Neck W Wo Contrast    Result Date: 8/28/2020  Impression: 1  Grossly stable multifocal extra-axial hemorrhage as described  Stable hypoattenuating subdural collections over bilateral cerebral hemispheres  No significant mass effect  2   Stable small layering blood within posterior horn of bilateral lateral ventricles  3   Patent major vasculature of [place] without significant stenosis  No aneurysm  4   No hemodynamically significant stenosis of major cervical vasculature  Workstation performed: [id number]     Xr Chest Portable    Result Date: 8/29/2020  Impression: No active pulmonary disease  Workstation performed: [id number]     Xr Shoulder 2+ Vw Right    Result Date: 8/29/2020  Impression: Possible nondisplaced fracture of the right humeral head  Limited study  Further evaluation with a complete study or follow-up CT scan recommended  The right elbow is unremarkable  The study was marked in [facility] for immediate notification  Workstation performed: [id number]     Xr Elbow 3+ Vw Right    Result Date: 8/29/2020  Impression: Possible nondisplaced fracture of the right humeral head  Limited study  Further evaluation with a complete study or follow-up CT scan recommended  The right elbow is unremarkable  The study was marked in [facility] for immediate notification  Workstation performed: [id number]     Xr Knee 4+ Vw Left Injury    Result Date: 8/28/2020  Impression: Healed fracture deformities proximal tibia and fibula No acute osseous abnormality   Workstation performed: [id number]     Xr Knee 4+ Vw Right Injury    Result Date: 8/28/2020  Impression: No acute osseous abnormality  Workstation performed: [id number]     Ct Head Wo Contrast    Result Date: 8/31/2020  Impression: 1  Slight interval improvement in multifocal extra-axial hemorrhage as described  No new acute intracranial hemorrhage  2   Stable size with interval decreased attenuation of hypodense subdural collections overlying bilateral cerebral hemispheres  Workstation performed: [id number]     Ct Head Wo Contrast    Result Date: 8/28/2020  Impression: Small amount of acute intracranial hemorrhage, slightly increased from the recent prior study with larger amount of intraventricular blood and left subarachnoid hemorrhage noted as well as bilateral small low-density subdural collections  No critical mass effect or acute infarction or parenchymal hemorrhage identified  The study was marked in [facility] for immediate notification  Workstation performed: [id number]     Trauma - Ct Head Wo Contrast    Result Date: 8/28/2020  Impression: Small amount of layering intraventricular hemorrhage occipital horns bilaterally  Question trace gyriform or subarachnoid blood left parietal region series 2/26  I personally discussed this study with NIRU MACKAY on 8/28/2020 at 9:49 AM  Workstation performed: [id number]     Trauma - Ct Spine Cervical Wo Contrast    Result Date: 8/28/2020  Impression: No cervical spine fracture or traumatic malalignment  Workstation performed: [id number]     Ct Spine Lumbar Without Contrast    Result Date: 8/28/2020  Impression: Mild multilevel degenerative spondylosis No acute traumatic injury identified Small right-sided disc protrusion L4-5 Nonobstructive upper pole left renal calculus Workstation performed: [id number]     Trauma - Ct Chest Abdomen Pelvis W Contrast    Result Date: 8/28/2020  Impression: 1    No acute traumatic injury identified within the chest  2   No intra-abdominal solid organ injury  3   Ill-defined hyperattenuating foci in the small bowel mesentery raises possibility of small mesenteric hematomas  Other considerations include adenopathy or carcinoid tumors although felt somewhat less likely  Attention on follow-up is recommended and  surgical consultation is advised  No free air or evidence of diffuse hemoperitoneum  4  Indeterminate left renal hypodensities  Nonemergent ultrasound correlation recommended  5  Question age-indeterminate nondisplaced fracture of the right anterior fifth and sixth ribs  6  Prostatomegaly  7  Left inguinal hernia containing fat and a portion of sigmoid colon  I personally discussed this study with NIRU MACKAY on 8/28/2020 at 9:49 AM  Workstation performed: [id number]       Complications: none    Condition at Discharge: good         Discharge instructions/Information to patient and family:   See after visit summary for information provided to patient and family  Provisions for Follow-Up Care:  See after visit summary for information related to follow-up care and any pertinent home health orders  PCP: Fransisco Henderson MD    Disposition: [facility]    Planned Readmission: No    Discharge Statement   I spent 23 minutes discharging the patient  This time was spent on the day of discharge  I had direct contact with the patient on the day of discharge  Additional documentation is required if more than 30 minutes were spent on discharge  Discharge Medications:  See after visit summary for reconciled discharge medications provided to patient and family

## 2020-09-15 NOTE — PLAN OF CARE
Problem: Potential for Falls  Goal: Patient will remain free of falls  Description: INTERVENTIONS:  - Assess patient frequently for physical needs  -  Identify cognitive and physical deficits and behaviors that affect risk of falls    -  [place] fall precautions as indicated by assessment   - Educate patient/family on patient safety including physical limitations  - Instruct patient to call for assistance with activity based on assessment  - Modify environment to reduce risk of injury  - Consider OT/PT consult to assist with strengthening/mobility  9/15/2020 1006 by Kelli Cho RN  Outcome: Adequate for Discharge  9/15/2020 0753 by Kelli Cho RN  Outcome: Progressing     Problem: PAIN - ADULT  Goal: Verbalizes/displays adequate comfort level or baseline comfort level  Description: Interventions:  - Encourage patient to monitor pain and request assistance  - Assess pain using appropriate pain scale  - Administer analgesics based on type and severity of pain and evaluate response  - Implement non-pharmacological measures as appropriate and evaluate response  - Consider cultural and social influences on pain and pain management  - Notify physician/advanced practitioner if interventions unsuccessful or patient reports new pain  9/15/2020 1006 by Kelli Cho RN  Outcome: Adequate for Discharge  9/15/2020 0753 by Kelli Cho RN  Outcome: Progressing     Problem: INFECTION - ADULT  Goal: Absence or prevention of progression during hospitalization  Description: INTERVENTIONS:  - Assess and monitor for signs and symptoms of infection  - Monitor lab/diagnostic results  - Monitor all insertion sites, i e  indwelling lines, tubes, and drains  - Monitor endotracheal if appropriate and nasal secretions for changes in amount and color  - [place] appropriate cooling/warming therapies per order  - Administer medications as ordered  - Instruct and encourage patient and family to use good hand hygiene technique  - Identify and instruct in appropriate isolation precautions for identified infection/condition  9/15/2020 1006 by Joe Strange RN  Outcome: Adequate for Discharge  9/15/2020 0753 by Joe Strange RN  Outcome: Progressing     Problem: SAFETY ADULT  Goal: Patient will remain free of falls  Description: INTERVENTIONS:  - Assess patient frequently for physical needs  -  Identify cognitive and physical deficits and behaviors that affect risk of falls    -  [place] fall precautions as indicated by assessment   - Educate patient/family on patient safety including physical limitations  - Instruct patient to call for assistance with activity based on assessment  - Modify environment to reduce risk of injury  - Consider OT/PT consult to assist with strengthening/mobility  9/15/2020 1006 by Joe Strange RN  Outcome: Adequate for Discharge  9/15/2020 0753 by Joe Strange RN  Outcome: Progressing  Goal: Maintain or return to baseline ADL function  Description: INTERVENTIONS:  -  Assess patient's ability to carry out ADLs; assess patient's baseline for ADL function and identify physical deficits which impact ability to perform ADLs (bathing, care of mouth/teeth, toileting, grooming, dressing, etc )  - Assess/evaluate cause of self-care deficits   - Assess range of motion  - Assess patient's mobility; develop plan if impaired  - Assess patient's need for assistive devices and provide as appropriate  - Encourage maximum independence but intervene and supervise when necessary  - Involve family in performance of ADLs  - Assess for home care needs following discharge   - Consider OT consult to assist with ADL evaluation and planning for discharge  - Provide patient education as appropriate  9/15/2020 1006 by Joe Strange RN  Outcome: Adequate for Discharge  9/15/2020 0753 by Joe Strange RN  Outcome: Progressing  Goal: Maintain or return mobility status to optimal level  Description: INTERVENTIONS:  - Assess patient's baseline mobility status (ambulation, transfers, stairs, etc )    - Identify cognitive and physical deficits and behaviors that affect mobility  - Identify mobility aids required to assist with transfers and/or ambulation (gait belt, sit-to-stand, lift, walker, cane, etc )  - [place] fall precautions as indicated by assessment  - Record patient progress and toleration of activity level on Mobility SBAR; progress patient to next Phase/Stage  - Instruct patient to call for assistance with activity based on assessment  - Consider rehabilitation consult to assist with strengthening/weightbearing, etc   9/15/2020 1006 by Diaz Bruce RN  Outcome: Adequate for Discharge  9/15/2020 0753 by Diaz Bruce RN  Outcome: Progressing     Problem: DISCHARGE PLANNING  Goal: Discharge to home or other facility with appropriate resources  Description: INTERVENTIONS:  - Identify barriers to discharge w/patient and caregiver  - Arrange for needed discharge resources and transportation as appropriate  - Identify discharge learning needs (meds, wound care, etc )  - Arrange for interpretive services to assist at discharge as needed  - Refer to Case Management Department for coordinating discharge planning if the patient needs post-hospital services based on physician/advanced practitioner order or complex needs related to functional status, cognitive ability, or social support system  9/15/2020 1006 by iDaz Bruce RN  Outcome: Adequate for Discharge  9/15/2020 0753 by Diaz Bruce RN  Outcome: Progressing     Problem: Knowledge Deficit  Goal: Patient/family/caregiver demonstrates understanding of disease process, treatment plan, medications, and discharge instructions  Description: Complete learning assessment and assess knowledge base    Interventions:  - Provide teaching at level of understanding  - Provide teaching via preferred learning methods  9/15/2020 1006 by Diaz Bruce RN  Outcome: Adequate for Discharge  9/15/2020 0753 by Calos Olvera RN  Outcome: Progressing     Problem: NEUROSENSORY - ADULT  Goal: Achieves stable or improved neurological status  Description: INTERVENTIONS  - Monitor and report changes in neurological status  - Monitor vital signs such as temperature, blood pressure, glucose, and any other labs ordered   - Initiate measures to prevent increased intracranial pressure  - Monitor for seizure activity and implement precautions if appropriate      9/15/2020 1006 by Calos Olvera RN  Outcome: Adequate for Discharge  9/15/2020 0753 by Calos Olvera RN  Outcome: Progressing  Goal: Remains free of injury related to seizures activity  Description: INTERVENTIONS  - Maintain airway, patient safety  and administer oxygen as ordered  - Monitor patient for seizure activity, document and report duration and description of seizure to physician/advanced practitioner  - If seizure occurs,  ensure patient safety during seizure  - Reorient patient post seizure  - Seizure pads on all 4 side rails  - Instruct patient/family to notify RN of any seizure activity including if an aura is experienced  - Instruct patient/family to call for assistance with activity based on nursing assessment  - Administer anti-seizure medications if ordered    9/15/2020 1006 by Calos Olvera RN  Outcome: Adequate for Discharge  9/15/2020 0753 by Calos Olvera RN  Outcome: Progressing  Goal: Achieves maximal functionality and self care  Description: INTERVENTIONS  - Monitor swallowing and airway patency with patient fatigue and changes in neurological status  - Encourage and assist patient to increase activity and self care     - Encourage visually impaired, hearing impaired and aphasic patients to use assistive/communication devices  9/15/2020 1006 by Calos Olvera RN  Outcome: Adequate for Discharge  9/15/2020 0753 by Calos Olvera RN  Outcome: Progressing     Problem: SKIN/TISSUE INTEGRITY - ADULT  Goal: Skin integrity remains intact  Description: INTERVENTIONS  - Identify patients at risk for skin breakdown  - Assess and monitor skin integrity  - Assess and monitor nutrition and hydration status  - Monitor labs (i e  albumin)  - Assess for incontinence   - Turn and reposition patient  - Assist with mobility/ambulation  - Relieve pressure over bony prominences  - Avoid friction and shearing  - Provide appropriate hygiene as needed including keeping skin clean and dry  - Evaluate need for skin moisturizer/barrier cream  - Collaborate with interdisciplinary team (i e  Nutrition, Rehabilitation, etc )   - Patient/family teaching  9/15/2020 1006 by Calos Olvera RN  Outcome: Adequate for Discharge  9/15/2020 0753 by Calos Olvera RN  Outcome: Progressing  Goal: Incision(s), wounds(s) or drain site(s) healing without S/S of infection  Description: INTERVENTIONS  - Assess and document risk factors for skin impairment   - Assess and document dressing, incision, wound bed, drain sites and surrounding tissue  - Consider nutrition services referral as needed  - Oral mucous membranes remain intact  - Provide patient/ family education  9/15/2020 1006 by Calos Olvera RN  Outcome: Adequate for Discharge  9/15/2020 0753 by Calos Olvera RN  Outcome: Progressing  Goal: Oral mucous membranes remain intact  Description: INTERVENTIONS  - Assess oral mucosa and hygiene practices  - Implement preventative oral hygiene regimen  - Implement oral medicated treatments as ordered  - Initiate Nutrition services referral as needed  9/15/2020 1006 by Calos Olvera RN  Outcome: Adequate for Discharge  9/15/2020 0753 by Calos Olvera RN  Outcome: Progressing     Problem: HEMATOLOGIC - ADULT  Goal: Maintains hematologic stability  Description: INTERVENTIONS  - Assess for signs and symptoms of bleeding or hemorrhage  - Monitor labs  - Administer supportive blood products/factors as ordered and appropriate  9/15/2020 1006 by Ramesh Graf RN  Outcome: Adequate for Discharge  9/15/2020 0753 by Ramesh Graf RN  Outcome: Progressing     Problem: Prexisting or High Potential for Compromised Skin Integrity  Goal: Skin integrity is maintained or improved  Description: INTERVENTIONS:  - Identify patients at risk for skin breakdown  - Assess and monitor skin integrity  - Assess and monitor nutrition and hydration status  - Monitor labs   - Assess for incontinence   - Turn and reposition patient  - Assist with mobility/ambulation  - Relieve pressure over bony prominences  - Avoid friction and shearing  - Provide appropriate hygiene as needed including keeping skin clean and dry  - Evaluate need for skin moisturizer/barrier cream  - Collaborate with interdisciplinary team   - Patient/family teaching  - Consider wound care consult   9/15/2020 1006 by Ramesh Graf RN  Outcome: Adequate for Discharge  9/15/2020 0753 by Ramesh Graf RN  Outcome: Progressing     Problem: COPING  Goal: Pt/Family able to verbalize concerns and demonstrate effective coping strategies  Description: INTERVENTIONS:  - Assist patient/family to identify coping skills, available support systems and cultural and spiritual values  - Provide emotional support, including active listening and acknowledgement of concerns of patient and caregivers  - Reduce environmental stimuli, as able  - Provide patient education  - Assess for spiritual pain/suffering and initiate spiritual care, including notification of Pastoral Care or afshan based community as needed  - Assess effectiveness of coping strategies  9/15/2020 1006 by Ramesh Graf RN  Outcome: Adequate for Discharge  9/15/2020 0753 by Ramesh Graf RN  Outcome: Progressing     Problem: DECISION MAKING  Goal: Pt/Family able to effectively weigh alternatives and participate in decision making related to treatment and care  Description: INTERVENTIONS:  - Identify decision maker  - Determine when there are differences among patient's view, family's view, and healthcare provider's view of patient condition and care goals  - Facilitate patient/family articulation of goals for care  - Help patient/family identify pros/cons of alternative solutions  - Provide information as requested by patient/family  - Respect patient/family rights related to privacy and sharing information   - Serve as a liaison between patient, family and health care team  - Initiate consults as appropriate (Ethics Team, Palliative Care, [address] [place] [address], etc )  9/15/2020 1006 by Felipe Foy RN  Outcome: Adequate for Discharge  9/15/2020 0753 by Felipe Foy RN  Outcome: Progressing     Problem: BEHAVIOR  Goal: Pt/Family maintain appropriate behavior and adhere to behavioral management agreement, if implemented  Description: INTERVENTIONS:  - Assess the family dynamic   - Encourage verbalization of thoughts and concerns in a socially appropriate manner  - Assess patient/family's coping skills and non-compliant behavior (including use of illegal substances)  - Utilize positive, consistent limit setting strategies supporting safety of patient, staff and others  - Initiate consult with Case Management, Spiritual Care or other ancillary services as appropriate  - If a patient's/visitor's behavior jeopardizes the safety of the patient, staff, or others, refer to organization procedure     - Notify Security of behavior or suspected illegal substances which indicate the need for search of the patient and/or belongings  - Encourage participation in the decision making process about a behavioral management agreement; implement if patient meets criteria  9/15/2020 1006 by Felipe Foy RN  Outcome: Adequate for Discharge  9/15/2020 0753 by Felipe Foy RN  Outcome: Progressing     Problem: Nutrition/Hydration-ADULT  Goal: Nutrient/Hydration intake appropriate for improving, restoring or maintaining nutritional needs  Description: Monitor and assess patient's nutrition/hydration status for malnutrition  Collaborate with interdisciplinary team and initiate plan and interventions as ordered  Monitor patient's weight and dietary intake as ordered or per policy  Utilize nutrition screening tool and intervene as necessary  Determine patient's food preferences and provide high-protein, high-caloric foods as appropriate       INTERVENTIONS:  - Monitor oral intake, urinary output, labs, and treatment plans  - Assess nutrition and hydration status and recommend course of action  - Evaluate amount of meals eaten  - Assist patient with eating if necessary   - Allow adequate time for meals  - Recommend/ encourage appropriate diets, oral nutritional supplements, and vitamin/mineral supplements  - Order, calculate, and assess calorie counts as needed  - Recommend, monitor, and adjust tube feedings and TPN/PPN based on assessed needs  - Assess need for intravenous fluids  - Provide specific nutrition/hydration education as appropriate  - Include patient/family/caregiver in decisions related to nutrition  9/15/2020 1006 by Ramesh Graf RN  Outcome: Adequate for Discharge  9/15/2020 0753 by Ramesh Graf, RN  Outcome: Progressing

## 2020-09-16 ENCOUNTER — TELEPHONE (OUTPATIENT)
Dept: OBGYN CLINIC | Facility: HOSPITAL | Age: 75
End: 2020-09-16

## 2020-09-16 ENCOUNTER — NURSING HOME VISIT (OUTPATIENT)
Dept: GERIATRICS | Facility: OTHER | Age: 75
End: 2020-09-16
Payer: MEDICARE

## 2020-09-16 DIAGNOSIS — S06.9X9A TRAUMATIC BRAIN INJURY WITH LOSS OF CONSCIOUSNESS, INITIAL ENCOUNTER (HCC): ICD-10-CM

## 2020-09-16 DIAGNOSIS — F31.9 BIPOLAR 1 DISORDER (HCC): Primary | ICD-10-CM

## 2020-09-16 PROCEDURE — 99310 SBSQ NF CARE HIGH MDM 45: CPT | Performed by: NURSE PRACTITIONER

## 2020-09-16 NOTE — TELEPHONE ENCOUNTER
Dr Mark Arrieta pt  Was a consult in the ED dispo stated ortho will follow up , please advise if pt  Is to follow up with you , if so how soon thanks

## 2020-09-16 NOTE — TELEPHONE ENCOUNTER
Tried calling pt  To set appt  With shoulder specialist , no answer and couldn't leave a message will call again later

## 2020-09-16 NOTE — TELEPHONE ENCOUNTER
Radiographs reviewed demonstrates glenohumeral arthritis, acromioclavicular joint arthritis, a located right shoulder joint, and slight superior migration of the clavicle consistent with acromioclavicular joint injury     Do you want to see him or have him f/u with shoulder specialist?

## 2020-09-17 NOTE — PROGRESS NOTES
[facility]  [address]  [address]  POS: 31: SNF/Short Term Rehab, [facility]  EVALUATION     NAME: Annita Christianson  AGE: 66 y o  SEX: male [id number]    DATE OF ENCOUNTER: 9/16/2020    Code status:  full code    Assessment and Plan      Diagnosis ICD-10-CM Associated Orders   1  Bipolar 1 disorder (HCC)  F31 9    2  [facility] with loss of consciousness[facility] )  S06  [id number]        All medications and routine orders were reviewed and updated as needed  Plan discussed with: Nurse    Treatment Recommendations/Precautions:    Continue Geodon 60 mg AM and 80 mg PM    Medication management every 2 weeks    Medications Risks/Benefits:      Risks, Benefits And Possible Side Effects Of Medications:    Risks, benefits, and possible side effects of medications explained to Manny Collins  He does not verbalize understanding of treatment at this time and will require further explanation  Controlled Medication Discussion:     Not applicable - controlled prescriptions are not prescribed by this practice    Chief Complaint     Seen for Psychiatric Consult  at Nursing Facility/Assisted Living    History of Present Illness     Patient was admitted 9/15/2020 after fall at home with subdural hematoma/subarchnoid hemorrhage    He is seen today in psychiatric consult to review current psychiatric medications as he is on Geodon 60 mg in AM and 80 mg PM   While in hospital patient was follow by Geriatric Medicine with following assessment:    "Traumatic brain injury  -patient with subarachnoid and subdural hemorrhages on admission suspected to be secondary to fall as patient was found down outside of home with multiple abrasions and injuries consistent with fall  -patient remains impulsive extremely hard of hearing, continue to provide redirection and reorientation as appropriate, continue use of dry erase board for communication which has been best way to communicate with patient     Cognitive impairment  -highly suspicious for at least cognitive impairment versus dementia, recommend close outpatient follow-up and comprehensive geriatric assessment  -continue to encourage frequent reorientation and redirection as appropriate and indicated  -patient awaiting placement as patient is unable to safely reside home alone this time      Suspected underlying bipolar disorder versus other chronic psychiatric illness  -patient's symptoms appear to be well controlled on home regimen Geodon which was continued on admission"    Patient currently presents awake and alert, oriented to self only  Cooperative, tries to respond to my questions (writing down for him as he is [facility]), but answers are irrelevant, nonsensical   He does appear to be calm at this time  Has had some restlessness but no aggression, unable to assess for psychosis  Given history as noted above, would continue medications the same for now  Will continue to follow up for any signs of psychosis or mood lability and adjust medications as needed  He  reports fluctuating sleep pattern, fluctuating appetite, fluctuating energy levels    The following portions of the patient's history were reviewed and updated as appropriate: allergies, current medications, past family history, past medical history, past social history, past surgical history and problem list     Past Psychiatric History:     Past Inpatient Psychiatric Treatment:   unknown  Past Outpatient Psychiatric Treatment:    unknown  Past Suicide Attempts: unknown  Past Violent Behavior: no  Past Psychiatric Medication Trials: patient does not remember    Traumatic History:     Abuse: unknown  Other Traumatic Events: unknown    HISTORY:  History reviewed  No pertinent past medical history  History reviewed  No pertinent family history    Social History     Socioeconomic History    Marital status: Single     Spouse name: None    Number of children: None    Years of education: None    Highest education level: None   Occupational History    None   Social Needs    Financial resource strain: None    Food insecurity     Worry: None     Inability: None    Transportation needs     Medical: None     Non-medical: None   Tobacco Use    Smoking status: Former Smoker    Smokeless tobacco: Never Used   Substance and Sexual Activity    Alcohol use: Not Currently    Drug use: Not Currently    Sexual activity: Not Currently   Lifestyle    Physical activity     Days per week: None     Minutes per session: None    Stress: None   Relationships    Social connections     Talks on phone: None     Gets together: None     Attends Evangelical service: None     Active member of club or organization: None     Attends meetings of clubs or organizations: None     Relationship status: None    Intimate partner violence     Fear of current or ex partner: None     Emotionally abused: None     Physically abused: None     Forced sexual activity: None   Other Topics Concern    None   Social History Narrative    None       Allergies:  No Known Allergies    Review of Systems     Review of Systems   Psychiatric/Behavioral: Positive for confusion and decreased concentration  All other systems reviewed and are negative        Medications and orders       Current Outpatient Medications:     acetaminophen (TYLENOL) 325 mg tablet, Take 2 tablets (650 mg total) by mouth every 6 (six) hours as needed for mild pain, moderate pain or headaches, Disp: 30 tablet, Rfl: 0    brimonidine (ALPHAGAN P) 0 15 % ophthalmic solution, Administer 1 drop to both eyes every 8 (eight) hours, Disp: , Rfl: 0    lidocaine (LIDODERM) 5 %, Apply 1 patch topically daily Remove & Discard patch within 12 hours or as directed by MD, Disp: , Rfl: 0    melatonin 3 mg, Take 2 tablets (6 mg total) by mouth daily at bedtime, Disp: , Rfl: 0    travoprost (TRAVATAN-Z) 0 004 % ophthalmic solution, Administer 1 drop to both eyes daily at bedtime, Disp: , Rfl: 0    ziprasidone (GEODON) 60 mg capsule, Take 1 capsule (60 mg total) by mouth daily, Disp: , Rfl: 0    ziprasidone (GEODON) 80 mg capsule, Take 1 capsule (80 mg total) by mouth every evening, Disp: , Rfl: 0      Objective     Mental Status Evaluation:    Appearance disheveled   Behavior calm   Speech scant, increased latency of response   Mood dysphoric   Affect blunted   Thought Processes disorganized   Associations loose associations   Thought Content no overt delusions, poverty of thought   Perceptual Disturbances: no auditory hallucinations, no visual hallucinations   Abnormal Thoughts  Risk Potential Suicidal ideation - None  Homicidal ideation - None  Potential for aggression - No   Orientation oriented to person   Memory recent memory impaired   Consciousness alert and awake   Attention Span Concentration Span poor attention span  poor concentration   Intellect not formally assessed   Insight poor   Judgement poor   Muscle Strength and  Gait not assessed   Motor Activity no abnormal movements   Language difficulty naming common objects, difficulty repeating a phrase, difficulty writing a sentence   Fund of Knowledge impaired knowledge of current events  impaired fund of knowledge regarding past history  impaired fund of knowledge regarding vocabulary   Pain none   Pain Scale 0       Physical Exam  Vitals signs and nursing note reviewed  Psychiatric:         Attention and Perception: He is inattentive  Mood and Affect: Affect is blunt  Cognition and Memory: Cognition is impaired  Judgment: Judgment is impulsive and inappropriate  Pertinent Laboratory/Diagnostic Studies:   I have personally reviewed pertinent lab results  Counseling / Coordination of Care  Total floor / unit time spent today 35 minutes  Greater than 50% of total time was spent with the patient and / or family counseling and / or coordination of care       Trish Samson Amy Franco, [address] [place] [address] 09/16/20

## 2020-09-28 ENCOUNTER — HOSPITAL ENCOUNTER (OUTPATIENT)
Dept: CT IMAGING | Facility: HOSPITAL | Age: 75
Discharge: HOME | End: 2020-09-28
Payer: MEDICARE

## 2020-09-28 DIAGNOSIS — S06.5X9A SUBDURAL HEMATOMA (HCC): ICD-10-CM

## 2020-09-28 PROCEDURE — 70450 CT HEAD/BRAIN W/O DYE: CPT

## 2020-09-28 PROCEDURE — G1004 CDSM NDSC: HCPCS

## 2020-10-06 ENCOUNTER — OFFICE VISIT (OUTPATIENT)
Dept: NEUROSURGERY | Facility: CLINIC | Age: 75
End: 2020-10-06
Payer: MEDICARE

## 2020-10-06 VITALS
BODY MASS INDEX: 23.83 KG/M2 | HEIGHT: 67 IN | RESPIRATION RATE: 16 BRPM | TEMPERATURE: 98.9 F | DIASTOLIC BLOOD PRESSURE: 84 MMHG | HEART RATE: 89 BPM | SYSTOLIC BLOOD PRESSURE: 116 MMHG

## 2020-10-06 DIAGNOSIS — S06.5X9A SUBDURAL HEMATOMA (HCC): Primary | ICD-10-CM

## 2020-10-06 PROCEDURE — 99214 OFFICE O/P EST MOD 30 MIN: CPT | Performed by: NURSE PRACTITIONER

## 2020-10-06 RX ORDER — ATORVASTATIN CALCIUM 40 MG/1
40 TABLET, FILM COATED ORAL DAILY
COMMUNITY
Start: 2020-07-29

## 2020-10-08 VITALS — TEMPERATURE: 98.8 F | SYSTOLIC BLOOD PRESSURE: 120 MMHG | HEART RATE: 88 BPM | DIASTOLIC BLOOD PRESSURE: 80 MMHG

## 2020-10-08 DIAGNOSIS — M19.011 PRIMARY OSTEOARTHRITIS OF RIGHT SHOULDER: Primary | ICD-10-CM

## 2020-10-08 PROCEDURE — 99214 OFFICE O/P EST MOD 30 MIN: CPT | Performed by: ORTHOPAEDIC SURGERY

## 2020-11-02 ENCOUNTER — TELEPHONE (OUTPATIENT)
Dept: NEUROSURGERY | Facility: CLINIC | Age: 75
End: 2020-11-02

## 2020-12-22 ENCOUNTER — NURSING HOME VISIT (OUTPATIENT)
Dept: GERIATRICS | Facility: OTHER | Age: 75
End: 2020-12-22
Payer: MEDICARE

## 2020-12-22 DIAGNOSIS — F31.9 BIPOLAR 1 DISORDER (HCC): Primary | ICD-10-CM

## 2020-12-22 DIAGNOSIS — S06.9X9A TRAUMATIC BRAIN INJURY WITH LOSS OF CONSCIOUSNESS, INITIAL ENCOUNTER (HCC): ICD-10-CM

## 2020-12-22 DIAGNOSIS — F03.90 DEMENTIA WITHOUT BEHAVIORAL DISTURBANCE, UNSPECIFIED DEMENTIA TYPE (HCC): Chronic | ICD-10-CM

## 2020-12-22 PROCEDURE — 99309 SBSQ NF CARE MODERATE MDM 30: CPT | Performed by: NURSE PRACTITIONER

## 2021-05-06 ENCOUNTER — HOSPITAL ENCOUNTER (OUTPATIENT)
Dept: CT IMAGING | Facility: HOSPITAL | Age: 76
Discharge: HOME | End: 2021-05-06
Payer: MEDICARE

## 2021-05-06 DIAGNOSIS — S06.5X9A SUBDURAL HEMATOMA (HCC): ICD-10-CM

## 2021-05-06 PROCEDURE — G1004 CDSM NDSC: HCPCS

## 2021-05-06 PROCEDURE — 70450 CT HEAD/BRAIN W/O DYE: CPT

## 2021-05-26 NOTE — ASSESSMENT & PLAN NOTE
Presents for follow up for SDH s/p presumed unwitnessed fall on 08/28, found down in front yard  · Lost to follow up secondary to covid pandemic  · Currently resides at facility, doing well per staff and himself  · Not on any AC/AP  · No headaches or visual disturbances  Telemedicine exam non-focal      Imaging:    · CT head 5/6/21: No acute intracranial abnormality  Diffuse generalized volume loss, moderately advanced chronic small vessel disease, resolution of previously noted multifocal ICH  Plan:  · Discussed that there has been interval resolution of SDH, and no further follow up is necessary  · Advised staff of plan  · Follow up as needed

## 2021-05-27 ENCOUNTER — TELEMEDICINE (OUTPATIENT)
Dept: NEUROSURGERY | Facility: CLINIC | Age: 76
End: 2021-05-27
Payer: MEDICARE

## 2021-05-27 VITALS
BODY MASS INDEX: 28.72 KG/M2 | DIASTOLIC BLOOD PRESSURE: 76 MMHG | TEMPERATURE: 98.2 F | WEIGHT: 183 LBS | HEIGHT: 67 IN | HEART RATE: 67 BPM | SYSTOLIC BLOOD PRESSURE: 120 MMHG | RESPIRATION RATE: 19 BRPM

## 2021-05-27 DIAGNOSIS — S06.5X9A SDH (SUBDURAL HEMATOMA) (HCC): Primary | ICD-10-CM

## 2021-05-27 PROCEDURE — 99213 OFFICE O/P EST LOW 20 MIN: CPT | Performed by: NURSE PRACTITIONER

## 2021-05-27 RX ORDER — MAG HYDROX/ALUMINUM HYD/SIMETH 400-400-40
30 SUSPENSION, ORAL (FINAL DOSE FORM) ORAL DAILY PRN
COMMUNITY

## 2021-05-27 RX ORDER — SODIUM PHOSPHATE,MONO-DIBASIC 19G-7G/118
1 ENEMA (ML) RECTAL AS NEEDED
COMMUNITY

## 2021-05-27 RX ORDER — BISACODYL 10 MG
10 SUPPOSITORY, RECTAL RECTAL AS NEEDED
COMMUNITY

## 2021-05-27 RX ORDER — TRIAMCINOLONE ACETONIDE 1 MG/G
CREAM TOPICAL 2 TIMES DAILY
COMMUNITY

## 2021-05-27 RX ORDER — PAROXETINE 10 MG/1
10 TABLET, FILM COATED ORAL DAILY
COMMUNITY

## 2021-05-27 RX ORDER — SENNA PLUS 8.6 MG/1
1 TABLET ORAL AS NEEDED
COMMUNITY

## 2021-05-27 NOTE — PROGRESS NOTES
Virtual Regular Visit      Assessment/Plan:    Problem List Items Addressed This Visit     None      Visit Diagnoses     SDH (subdural hematoma) ([facility] [address] )    -  Primary            Presents for follow up for SDH s/p presumed unwitnessed fall on 08/28, found down in front yard  · Lost to follow up secondary to covid pandemic  · Currently resides at facility, doing well per staff and himself  · Not on any AC/AP  · No headaches or visual disturbances  Telemedicine exam non-focal      Imaging:    · CT head 5/6/21: No acute intracranial abnormality  Diffuse generalized volume loss, moderately advanced chronic small vessel disease, resolution of previously noted multifocal ICH  Plan:  · Discussed that there has been interval resolution of SDH, and no further follow up is necessary  · Advised staff of plan  · Follow up as needed  Reason for visit is   Chief Complaint   Patient presents with    Virtual Regular Visit     FOLLOW UP AFTER CT HEAD ~ WAS DO BACK 11/2020        Encounter provider GILBERT Jean    Provider located at [address] [place]  [address] [facility] [id number] [phone]      Recent Visits  No visits were found meeting these conditions  Showing recent visits within past 7 days and meeting all other requirements     Today's Visits  Date Type Provider Dept   05/27/21 Telemedicine Adriel Santiago [facility] today's visits and meeting all other requirements     Future Appointments  No visits were found meeting these conditions  Showing future appointments within next 150 days and meeting all other requirements        The patient was identified by name and date of birth  Jesus Venegas  was informed that this is a telemedicine visit and that the visit is being conducted through [facility] and patient was informed that this is a secure, HIPAA-compliant platform  He agrees to proceed     My office door was closed  No one else was in the room  He acknowledged consent and understanding of privacy and security of the video platform  The patient has agreed to participate and understands they can discontinue the visit at any time  Patient is aware this is a billable service  Subjective  Myesha Vanegas  is a 66 y o  male With past medical history type 2 diabetes, dementia, who sustained a presumed fall on 08/28/2020  He apparently was found   At the time in his front yd disheveled and covered in abrasions  He was noted on imaging with subdural hematoma, subarachnoid hemorrhage, and IVH  He was seen for  Follow-up in October of last year and was then lost to follow-up secondary to the   [employer] elbert Weston  he states he is doing well  He denies any headache or visual disturbances  He states he has to go to the bathroom  Per staff there he is somewhat confused at times and occasionally incontinent but mobilize as well and participates in activities  She states he will be at this facility long-term  HPI     No past medical history on file  No past surgical history on file      Current Outpatient Medications   Medication Sig Dispense Refill    acetaminophen (TYLENOL) 325 mg tablet Take 2 tablets (650 mg total) by mouth every 6 (six) hours as needed for mild pain, moderate pain or headaches 30 tablet 0    atorvastatin (LIPITOR) 40 mg tablet Take 40 mg by mouth daily       bisacodyl (Biscolax) 10 mg suppository Insert 10 mg into the rectum as needed for constipation      brimonidine (ALPHAGAN P) 0 15 % ophthalmic solution Administer 1 drop to both eyes every 8 (eight) hours  0    lidocaine (LIDODERM) 5 % Apply 1 patch topically daily Remove & Discard patch within 12 hours or as directed by MD  0    magnesium hydroxide (Milk of Magnesia) 400 mg/5 mL oral suspension Take 30 mL by mouth daily as needed for constipation      melatonin 3 mg Take 2 tablets (6 mg total) by mouth daily at bedtime  0    PARoxetine (PAXIL) 10 mg tablet Take 10 mg by mouth daily      senna (SENOKOT) 8 6 MG tablet Take 1 tablet by mouth as needed for constipation      sodium phosphate-biphosphate (FLEET) 7-19 g 118 mL enema Insert 1 enema into the rectum as needed      travoprost (TRAVATAN-Z) 0 004 % ophthalmic solution Administer 1 drop to both eyes daily at bedtime  0    triamcinolone (KENALOG) 0 1 % cream Apply topically 2 (two) times a day      ziprasidone (GEODON) 60 mg capsule Take 1 capsule (60 mg total) by mouth daily  0    ziprasidone (GEODON) 80 mg capsule Take 1 capsule (80 mg total) by mouth every evening  0    metFORMIN (GLUCOPHAGE) 1000 MG tablet Take 1,000 mg by mouth 2 (two) times a day       No current facility-administered medications for this visit  No Known Allergies    Review of Systems   Unable to perform ROS: Other   Constitutional: Negative  HENT: Positive for hearing loss (hard of hearing, wears hearing aids)  Eyes: Negative  Respiratory: Negative  Cardiovascular: Negative  Gastrointestinal: Negative  Endocrine: Negative  Genitourinary: Negative  Musculoskeletal: Negative  Skin: Negative  Allergic/Immunologic: Negative  Neurological: Negative  Hematological: Negative  Psychiatric/Behavioral: Positive for confusion (baseline)  ROS reviewed and edited as needed  Video Exam    Vitals:    05/27/21 0953   BP: 120/76   Pulse: 67   Resp: 19   Temp: 98 2 °F (36 8 °C)   Weight: 83 kg (183 lb)   Height: 5' 7" (1 702 m)       Physical Exam  Constitutional:       Comments: Telemedicine visit  Ambulates without difficulty  Speech fluid  Face symmetric  Very hard of hearing  Moving all extremities  I spent 10 minutes directly with the patient during this visit      Gregg 35  acknowledges that he has consented to an online visit or consultation   He understands that the online visit is based solely on information provided by him, and that, in the absence of a face-to-face physical evaluation by the physician, the diagnosis he receives is both limited and provisional in terms of accuracy and completeness  This is not intended to replace a full medical face-to-face evaluation by the physician  Rekha Nelson  understands and accepts these terms

## 2023-04-17 NOTE — ASSESSMENT & PLAN NOTE
- Suspected stage I pressure injury of the back  - Inpatient Wound Care service evaluation and recommendations appreciated  - Continue with local wound care to multiple wound sites  Medication request     simvastatin 40 mg tablet     #90 last dispensed 10/12/22    1/12/2023 Last office visit with Dhruv Funez M.D.  Next appt. With Chantal Frias M.D.  On 4/21/23     Refilled medication per protocol       Wt Readings from Last 1 Encounters:   01/12/23 72.3 kg (159 lb 4.8 oz)        BP Readings from Last 2 Encounters:   01/12/23 120/60   01/03/23 126/64   ]    Lab Results   Component Value Date    SODIUM 141 01/03/2023    POTASSIUM 4.6 01/03/2023    CHLORIDE 106 01/03/2023    CO2 30 01/03/2023    BUN 25 (H) 01/03/2023    CREATININE 0.75 01/03/2023    GLUCOSE 109 (H) 01/03/2023     Hemoglobin A1C (%)   Date Value   01/03/2023 7.1 (H)     No results found for: TSH  Lab Results   Component Value Date    CHOLESTEROL 139 09/29/2022    HDL 59 09/29/2022    CALCLDL 67 09/29/2022    TRIGLYCERIDE 64 09/29/2022     Lab Results   Component Value Date    AST 16 01/03/2023    GPT 22 01/03/2023    ALKPT 78 01/03/2023    BILIRUBIN 0.3 01/03/2023